# Patient Record
Sex: FEMALE | Race: WHITE | Employment: OTHER | URBAN - METROPOLITAN AREA
[De-identification: names, ages, dates, MRNs, and addresses within clinical notes are randomized per-mention and may not be internally consistent; named-entity substitution may affect disease eponyms.]

---

## 2017-03-22 DIAGNOSIS — K21.9 GASTROESOPHAGEAL REFLUX DISEASE WITHOUT ESOPHAGITIS: ICD-10-CM

## 2017-03-26 NOTE — TELEPHONE ENCOUNTER
omeprazole 20 MG        Last Written Prescription Date:  3/22/16  Last Fill Quantity: 90,   # refills: 3  Last Office Visit : 10/2/16  Future Office visit:  none

## 2017-04-14 DIAGNOSIS — K21.9 GASTROESOPHAGEAL REFLUX DISEASE WITHOUT ESOPHAGITIS: ICD-10-CM

## 2017-05-01 DIAGNOSIS — I10 ESSENTIAL HYPERTENSION: ICD-10-CM

## 2017-05-02 RX ORDER — LOSARTAN POTASSIUM 50 MG/1
TABLET ORAL
Qty: 7 TABLET | Refills: 0 | Status: SHIPPED | OUTPATIENT
Start: 2017-05-02 | End: 2017-07-03

## 2017-05-02 RX ORDER — ATENOLOL 50 MG/1
TABLET ORAL
Qty: 7 TABLET | Refills: 0 | Status: SHIPPED | OUTPATIENT
Start: 2017-05-02 | End: 2017-07-03

## 2017-05-09 ENCOUNTER — PRE VISIT (OUTPATIENT)
Dept: CARDIOLOGY | Facility: CLINIC | Age: 77
End: 2017-05-09

## 2017-05-09 DIAGNOSIS — E78.5 HYPERLIPIDEMIA WITH TARGET LDL LESS THAN 100: Primary | ICD-10-CM

## 2017-05-09 DIAGNOSIS — Z78.9 STATIN INTOLERANCE: ICD-10-CM

## 2017-05-11 ENCOUNTER — OFFICE VISIT (OUTPATIENT)
Dept: CARDIOLOGY | Facility: CLINIC | Age: 77
End: 2017-05-11
Attending: INTERNAL MEDICINE
Payer: COMMERCIAL

## 2017-05-11 VITALS
BODY MASS INDEX: 35.13 KG/M2 | DIASTOLIC BLOOD PRESSURE: 80 MMHG | OXYGEN SATURATION: 93 % | HEIGHT: 62 IN | HEART RATE: 59 BPM | SYSTOLIC BLOOD PRESSURE: 149 MMHG | WEIGHT: 190.9 LBS

## 2017-05-11 DIAGNOSIS — E78.5 HYPERLIPIDEMIA LDL GOAL <100: Primary | ICD-10-CM

## 2017-05-11 DIAGNOSIS — I10 ESSENTIAL HYPERTENSION, BENIGN: ICD-10-CM

## 2017-05-11 DIAGNOSIS — R73.01 ELEVATED FASTING GLUCOSE: ICD-10-CM

## 2017-05-11 DIAGNOSIS — E78.5 HYPERLIPIDEMIA WITH TARGET LDL LESS THAN 100: ICD-10-CM

## 2017-05-11 DIAGNOSIS — Z78.9 STATIN INTOLERANCE: ICD-10-CM

## 2017-05-11 LAB
ALBUMIN SERPL-MCNC: 3.6 G/DL (ref 3.4–5)
ALP SERPL-CCNC: 86 U/L (ref 40–150)
ALT SERPL W P-5'-P-CCNC: 22 U/L (ref 0–50)
ANION GAP SERPL CALCULATED.3IONS-SCNC: 11 MMOL/L (ref 3–14)
AST SERPL W P-5'-P-CCNC: 16 U/L (ref 0–45)
BILIRUB SERPL-MCNC: 0.6 MG/DL (ref 0.2–1.3)
BUN SERPL-MCNC: 14 MG/DL (ref 7–30)
CALCIUM SERPL-MCNC: 9.3 MG/DL (ref 8.5–10.1)
CHLORIDE SERPL-SCNC: 106 MMOL/L (ref 94–109)
CHOLEST SERPL-MCNC: 218 MG/DL
CO2 SERPL-SCNC: 25 MMOL/L (ref 20–32)
CREAT SERPL-MCNC: 0.62 MG/DL (ref 0.52–1.04)
GFR SERPL CREATININE-BSD FRML MDRD: ABNORMAL ML/MIN/1.7M2
GLUCOSE SERPL-MCNC: 110 MG/DL (ref 70–99)
HDLC SERPL-MCNC: 42 MG/DL
LDLC SERPL CALC-MCNC: 132 MG/DL
NONHDLC SERPL-MCNC: 176 MG/DL
POTASSIUM SERPL-SCNC: 4.1 MMOL/L (ref 3.4–5.3)
PROT SERPL-MCNC: 7.2 G/DL (ref 6.8–8.8)
SODIUM SERPL-SCNC: 141 MMOL/L (ref 133–144)
TRIGL SERPL-MCNC: 220 MG/DL

## 2017-05-11 PROCEDURE — 80053 COMPREHEN METABOLIC PANEL: CPT | Performed by: INTERNAL MEDICINE

## 2017-05-11 PROCEDURE — 36415 COLL VENOUS BLD VENIPUNCTURE: CPT | Performed by: INTERNAL MEDICINE

## 2017-05-11 PROCEDURE — 99213 OFFICE O/P EST LOW 20 MIN: CPT | Mod: ZF

## 2017-05-11 PROCEDURE — 80061 LIPID PANEL: CPT | Performed by: INTERNAL MEDICINE

## 2017-05-11 PROCEDURE — 99213 OFFICE O/P EST LOW 20 MIN: CPT | Mod: ZP | Performed by: INTERNAL MEDICINE

## 2017-05-11 RX ORDER — HYDROCHLOROTHIAZIDE 12.5 MG/1
12.5 TABLET ORAL DAILY
Qty: 90 TABLET | Refills: 3 | Status: SHIPPED | OUTPATIENT
Start: 2017-05-11 | End: 2018-04-15

## 2017-05-11 NOTE — PATIENT INSTRUCTIONS
Return to clinic in one year.  Fasting labs will be needed prior to this appointment.     Please do not hesitate to call if you have any cardiology related questions or concerns, or need to schedule an appointment, at [phone].         Cardiology Medication Refill Request Information:  * Please contact your pharmacy regarding ANY request for medication refills.  ** [facility] Prescription Fax = [phone]  * Please allow 3 business days for routine medication refills.    Cardiology Test Result notification information:  *You will be notified with in 7-10 days of your appointment day regarding the results of your test. If you are on MyChart you will be notified as soon as the provider has reviewed the results and signed off on them. Please call RN Care Coordinator with questions regarding results.

## 2017-05-11 NOTE — PROGRESS NOTES
HPI:     I had the privilege to evaluate and examine Mrs Jessica Loaiza, who is a 75 yrs female patient with hyperlipidemia and statin intolerance.   Patient has hyperlipidemia and has followed a relatively well lipid-lowering diet.   She has tried several statins (low potent and high potent) but needed to stop because of muscle pain.   Patient denies chest pain, shortness of breath, palpitations and intermittent claudication.  Patient tolerates metamucil (as an alternative for a resin) and fish oil very well and is happy with this regimen.       PAST MEDICAL HISTORY:  Past Medical History:   Diagnosis Date     Cataracts, bilateral      Dyslipidemia      HTN (hypertension)      Statin intolerance 10/22/2015       CURRENT MEDICATIONS:  Current Outpatient Prescriptions   Medication Sig Dispense Refill     losartan (COZAAR) 50 MG tablet TAKE ONE TABLET BY MOUTH EVERY DAY 7 tablet 0     atenolol (TENORMIN) 50 MG tablet TAKE ONE TABLET BY MOUTH EVERY DAY 7 tablet 0     omeprazole (PRILOSEC) 20 MG CR capsule TAKE 1 TABLET(20 MG) BY MOUTH DAILY 30 TO 60 MINUTES BEFORE A MEAL 90 capsule 1     omeprazole (PRILOSEC) 20 MG CR capsule Take 1 tablet (20 mg) by mouth daily Take 30-60 minutes before a meal. 90 capsule 1     polyethylene glycol 0.4%- propylene glycol 0.3% (SYSTANE ULTRA) 0.4-0.3 % SOLN ophthalmic solution Place 1 drop into both eyes 2 times daily       cyabnocobalamin (VITAMIN B-12) 2500 MCG sublingual tablet Take 1 tablet by mouth daily       calcium carb 1250 mg, 500 mg [place],/vitamin D 200 unit (CALCIUM 500/D) 500-200 MG-UNIT per tablet Take 1 tablet by mouth 2 times daily       citalopram (CELEXA) 20 MG tablet Take 1 tablet (20 mg) by mouth daily 90 tablet 3     hydrochlorothiazide 12.5 MG TABS Take 1 tablet (12.5 mg) by mouth daily (Patient taking differently: Take 12.5 mg by mouth every other day ) 90 tablet 3     psyllium (METAMUCIL SMOOTH TEXTURE) 63 % POWD Take 1 Tablespoonful by mouth 3 times daily Mix  in 8 ounces of water 1 Bottle 11     Omega-3 Fatty Acids (OMEGA 3 PO) Take 1 capsule by mouth daily        COPPER PO Take 1 tablet by mouth as needed        ORDER FOR DME Use your CPAP device as directed by your provider.       minoxidil (ROGAINE) 2 % external solution Apply topically daily        Cetirizine HCl (ZYRTEC PO) Take 1 tablet by mouth daily       econazole nitrate 1 % cream Apply topically 2 times daily 85 g 5     SALICYLIC ACID 40%, UREA 20% IN PETROLEUM, FV COMPOUNDED,    CREAM Twice daily (Patient taking differently: once daily) 120 g 3     Urea 40 % CREA Externally apply  topically. Apply to feet daily 60 g 3     aspirin 81 MG tablet Take 1 tablet by mouth daily.       Calcium Citrate-Vitamin D (CITRACAL + D PO) Take 1 tablet by mouth daily        Multiple Vitamins-Minerals (CENTRUM SILVER PO) Take  by mouth.       Ranitidine HCl (ZANTAC PO) Take 1 tablet by mouth At Bedtime      Patient takes 2 tablets Metasupport OTC to lower chol - she is statin intolerant    PAST SURGICAL HISTORY:  No past surgical history on file.    ALLERGIES     Allergies   Allergen Reactions     Gabapentin Nausea     Ibuprofen Sodium GI Disturbance     Simvastatin Cramps     Other reaction(s): Other  Groin and leg cramps       FAMILY HISTORY:  No family history on file.    SOCIAL HISTORY:  Social History     Social History     Marital status:      Spouse name: N/A     Number of children: N/A     Years of education: N/A     Social History Main Topics     Smoking status: Never Smoker     Smokeless tobacco: Never Used     Alcohol use 0.5 oz/week     1 Glasses of wine per week     Drug use: Not on file     Sexual activity: Not on file     Other Topics Concern     Not on file     Social History Narrative       ROS:   Constitutional: No fever, chills, or sweats. No weight gain/loss   ENT: No visual disturbance, ear ache, epistaxis, sore throat  Allergies/Immunologic: Negative.   Respiratory: No cough,  "hemoptysia  Cardiovascular: As per HPI  GI: No nausea, vomiting, hematemesis, melena, or hematochezia  : No urinary frequency, dysuria, or hematuria  Integument: Negative  Psychiatric: Negative  Neuro: Negative  Endocrinology: Negative   Musculoskeletal: Negative    EXAM:  /80 (BP Location: Right arm, Patient Position: Chair, Cuff Size: Adult Regular)  Pulse 59  Ht 1.562 m (5' 1.5\")  Wt 86.6 kg (190 lb 14.4 oz)  SpO2 93%  BMI 35.49 kg/m2  In general, the patient is a pleasant female in no apparent distress.    HEENT: NC/AT.  PERRLA.  EOMI.  Sclerae white, not injected.  Nares clear.  Pharynx without erythema or exudate.  Dentition intact.    Neck: No adenopathy.  No thyromegaly. Carotids +4/4 bilaterally without bruits.  No jugular venous distension.   Heart: RRR. Normal S1, S2 splits physiologically. No murmur, rub, click, or gallop. The PMI is in the 5th ICS in the midclavicular line. There is no heave.    Lungs: CTA.  No ronchi, wheezes, rales.  No dullness to percussion.   Abdomen: Soft, nontender, nondistended. No organomegaly.  No bruits.   Extremities: No clubbing, cyanosis, or edema.  The pulses are +4/4 at the radial, brachial, femoral, popliteal, DP, and PT sites bilaterally.  No bruits are noted.  Neurologic: Alert and oriented to person/place/time, normal speech, gait and affect  Skin: No petechiae, purpura or rash    BP at end of the visit 122/78.    Labs:     Ref. Range 5/11/2017 10:54   Sodium Latest Ref Range: 133 - 144 mmol/L 141   Potassium Latest Ref Range: 3.4 - 5.3 mmol/L 4.1   Chloride Latest Ref Range: 94 - 109 mmol/L 106   Carbon Dioxide Latest Ref Range: 20 - 32 mmol/L 25   Urea Nitrogen Latest Ref Range: 7 - 30 mg/dL 14   Creatinine Latest Ref Range: 0.52 - 1.04 mg/dL 0.62   GFR Estimate Latest Ref Range: >60 mL/min/1.7m2 >90...   GFR Estimate If Black Latest Ref Range: >60 mL/min/1.7m2 >90...   Calcium Latest Ref Range: 8.5 - 10.1 mg/dL 9.3   Anion Gap Latest Ref Range: 3 - 14 " mmol/L 11   Albumin Latest Ref Range: 3.4 - 5.0 g/dL 3.6   Protein Total Latest Ref Range: 6.8 - 8.8 g/dL 7.2   Bilirubin Total Latest Ref Range: 0.2 - 1.3 mg/dL 0.6   Alkaline Phosphatase Latest Ref Range: 40 - 150 U/L 86   ALT Latest Ref Range: 0 - 50 U/L 22   AST Latest Ref Range: 0 - 45 U/L 16   Cholesterol Latest Ref Range: <200 mg/dL 218 (H)   HDL Cholesterol Latest Ref Range: >49 mg/dL 42 (L)   LDL Cholesterol Calculated Latest Ref Range: <100 mg/dL 132 (H)   Non HDL Cholesterol Latest Ref Range: <130 mg/dL 176 (H)   Triglycerides Latest Ref Range: <150 mg/dL 220 (H)   Glucose Latest Ref Range: 70 - 99 mg/dL 110 (H)     !LIPID/HEPATIC Latest Ref Rng & Units 10/26/2016 5/11/2017   CHOLESTEROL <200 mg/dL 239 (H) 218 (H)   TRIGLYCERIDES <150 mg/dL 195 (H) 220 (H)   HDL CHOLESTEROL >49 mg/dL 37 (L) 42 (L)   LDL CHOLESTEROL, CALCULATED <100 mg/dL 164 (H) 132 (H)   NON HDL CHOLESTEROL <130 mg/dL 202 (H) 176 (H)               Assessment and Plan:        We discussed the results with the patient:  We re-emphasized the importance of a heart healthy and lipid-lowering diet.  We continue with same medical regimen.  Follow-up within 1 year.    Isaías Landry MD, PhD  Professor of Medicine  Division of Cardiology      CC  Patient Care Team:  Babatunde Mueller MD as PCP - General (Family Practice)  Saima Garcia RN as Nurse Coordinator (Cardiology)  Isaías Landry MD as MD (Cardiology)  Janae Myrick MD as MD (INTERNAL MEDICINE - ENDOCRINOLOGY, DIABETES & METABOLISM)  Susan Bear MD as MD (General Surgery)  BABATUNDE MUELLER

## 2017-05-11 NOTE — LETTER
5/11/2017      RE: Jessica Loaiza  [address]  [place] [address]       Dear Colleague,    Thank you for the opportunity to participate in the care of your patient, Jessica Loaiza, at the [facility]. Please see a copy of my visit note below.    HPI:     I had the privilege to evaluate and examine Mrs Jessica Loaiza, who is a 75 yrs female patient with hyperlipidemia and statin intolerance.   Patient has hyperlipidemia and has followed a relatively well lipid-lowering diet.   She has tried several statins (low potent and high potent) but needed to stop because of muscle pain.   Patient denies chest pain, shortness of breath, palpitations and intermittent claudication.  Patient tolerates metamucil (as an alternative for a resin) and fish oil very well and is happy with this regimen.       PAST MEDICAL HISTORY:  Past Medical History:   Diagnosis Date     Cataracts, bilateral      Dyslipidemia      HTN (hypertension)      Statin intolerance 10/22/2015       CURRENT MEDICATIONS:  Current Outpatient Prescriptions   Medication Sig Dispense Refill     losartan (COZAAR) 50 MG tablet TAKE ONE TABLET BY MOUTH EVERY DAY 7 tablet 0     atenolol (TENORMIN) 50 MG tablet TAKE ONE TABLET BY MOUTH EVERY DAY 7 tablet 0     omeprazole (PRILOSEC) 20 MG CR capsule TAKE 1 TABLET(20 MG) BY MOUTH DAILY 30 TO 60 MINUTES BEFORE A MEAL 90 capsule 1     omeprazole (PRILOSEC) 20 MG CR capsule Take 1 tablet (20 mg) by mouth daily Take 30-60 minutes before a meal. 90 capsule 1     polyethylene glycol 0.4%- propylene glycol 0.3% (SYSTANE ULTRA) 0.4-0.3 % SOLN ophthalmic solution Place 1 drop into both eyes 2 times daily       cyabnocobalamin (VITAMIN B-12) 2500 MCG sublingual tablet Take 1 tablet by mouth daily       calcium carb 1250 mg, 500 mg [place],/vitamin D 200 unit (CALCIUM 500/D) 500-200 MG-UNIT per tablet Take 1 tablet by mouth 2 times daily       citalopram  (CELEXA) 20 MG tablet Take 1 tablet (20 mg) by mouth daily 90 tablet 3     hydrochlorothiazide 12.5 MG TABS Take 1 tablet (12.5 mg) by mouth daily (Patient taking differently: Take 12.5 mg by mouth every other day ) 90 tablet 3     psyllium (METAMUCIL SMOOTH TEXTURE) 63 % POWD Take 1 Tablespoonful by mouth 3 times daily Mix in 8 ounces of water 1 Bottle 11     Omega-3 Fatty Acids (OMEGA 3 PO) Take 1 capsule by mouth daily        COPPER PO Take 1 tablet by mouth as needed        ORDER FOR DME Use your CPAP device as directed by your provider.       minoxidil (ROGAINE) 2 % external solution Apply topically daily        Cetirizine HCl (ZYRTEC PO) Take 1 tablet by mouth daily       econazole nitrate 1 % cream Apply topically 2 times daily 85 g 5     SALICYLIC ACID 40%, UREA 20% IN PETROLEUM, FV COMPOUNDED,    CREAM Twice daily (Patient taking differently: once daily) 120 g 3     Urea 40 % CREA Externally apply  topically. Apply to feet daily 60 g 3     aspirin 81 MG tablet Take 1 tablet by mouth daily.       Calcium Citrate-Vitamin D (CITRACAL + D PO) Take 1 tablet by mouth daily        Multiple Vitamins-Minerals (CENTRUM SILVER PO) Take  by mouth.       Ranitidine HCl (ZANTAC PO) Take 1 tablet by mouth At Bedtime      Patient takes 2 tablets Metasupport OTC to lower chol - she is statin intolerant    PAST SURGICAL HISTORY:  No past surgical history on file.    ALLERGIES     Allergies   Allergen Reactions     Gabapentin Nausea     Ibuprofen Sodium GI Disturbance     Simvastatin Cramps     Other reaction(s): Other  Groin and leg cramps       FAMILY HISTORY:  No family history on file.    SOCIAL HISTORY:  Social History     Social History     Marital status:      Spouse name: N/A     Number of children: N/A     Years of education: N/A     Social History Main Topics     Smoking status: Never Smoker     Smokeless tobacco: Never Used     Alcohol use 0.5 oz/week     1 Glasses of wine per week     Drug use: Not on file  "    Sexual activity: Not on file     Other Topics Concern     Not on file     Social History Narrative       ROS:   Constitutional: No fever, chills, or sweats. No weight gain/loss   ENT: No visual disturbance, ear ache, epistaxis, sore throat  Allergies/Immunologic: Negative.   Respiratory: No cough, hemoptysia  Cardiovascular: As per HPI  GI: No nausea, vomiting, hematemesis, melena, or hematochezia  : No urinary frequency, dysuria, or hematuria  Integument: Negative  Psychiatric: Negative  Neuro: Negative  Endocrinology: Negative   Musculoskeletal: Negative    EXAM:  /80 (BP Location: Right arm, Patient Position: Chair, Cuff Size: Adult Regular)  Pulse 59  Ht 1.562 m (5' 1.5\")  Wt 86.6 kg (190 lb 14.4 oz)  SpO2 93%  BMI 35.49 kg/m2  In general, the patient is a pleasant female in no apparent distress.    HEENT: NC/AT.  PERRLA.  EOMI.  Sclerae white, not injected.  Nares clear.  Pharynx without erythema or exudate.  Dentition intact.    Neck: No adenopathy.  No thyromegaly. Carotids +4/4 bilaterally without bruits.  No jugular venous distension.   Heart: RRR. Normal S1, S2 splits physiologically. No murmur, rub, click, or gallop. The PMI is in the 5th ICS in the midclavicular line. There is no heave.    Lungs: CTA.  No ronchi, wheezes, rales.  No dullness to percussion.   Abdomen: Soft, nontender, nondistended. No organomegaly.  No bruits.   Extremities: No clubbing, cyanosis, or edema.  The pulses are +4/4 at the radial, brachial, femoral, popliteal, DP, and PT sites bilaterally.  No bruits are noted.  Neurologic: Alert and oriented to person/place/time, normal speech, gait and affect  Skin: No petechiae, purpura or rash    BP at end of the visit 122/78.    Labs:     Ref. Range 5/11/2017 10:54   Sodium Latest Ref Range: 133 - 144 mmol/L 141   Potassium Latest Ref Range: 3.4 - 5.3 mmol/L 4.1   Chloride Latest Ref Range: 94 - 109 mmol/L 106   Carbon Dioxide Latest Ref Range: 20 - 32 mmol/L 25   Urea " Nitrogen Latest Ref Range: 7 - 30 mg/dL 14   Creatinine Latest Ref Range: 0.52 - 1.04 mg/dL 0.62   GFR Estimate Latest Ref Range: >60 mL/min/1.7m2 >90...   GFR Estimate If Black Latest Ref Range: >60 mL/min/1.7m2 >90...   Calcium Latest Ref Range: 8.5 - 10.1 mg/dL 9.3   Anion Gap Latest Ref Range: 3 - 14 mmol/L 11   Albumin Latest Ref Range: 3.4 - 5.0 g/dL 3.6   Protein Total Latest Ref Range: 6.8 - 8.8 g/dL 7.2   Bilirubin Total Latest Ref Range: 0.2 - 1.3 mg/dL 0.6   Alkaline Phosphatase Latest Ref Range: 40 - 150 U/L 86   ALT Latest Ref Range: 0 - 50 U/L 22   AST Latest Ref Range: 0 - 45 U/L 16   Cholesterol Latest Ref Range: <200 mg/dL 218 (H)   HDL Cholesterol Latest Ref Range: >49 mg/dL 42 (L)   LDL Cholesterol Calculated Latest Ref Range: <100 mg/dL 132 (H)   Non HDL Cholesterol Latest Ref Range: <130 mg/dL 176 (H)   Triglycerides Latest Ref Range: <150 mg/dL 220 (H)   Glucose Latest Ref Range: 70 - 99 mg/dL 110 (H)     !LIPID/HEPATIC Latest Ref Rng & Units 10/26/2016 5/11/2017   CHOLESTEROL <200 mg/dL 239 (H) 218 (H)   TRIGLYCERIDES <150 mg/dL 195 (H) 220 (H)   HDL CHOLESTEROL >49 mg/dL 37 (L) 42 (L)   LDL CHOLESTEROL, CALCULATED <100 mg/dL 164 (H) 132 (H)   NON HDL CHOLESTEROL <130 mg/dL 202 (H) 176 (H)               Assessment and Plan:        We discussed the results with the patient:  We re-emphasized the importance of a heart healthy and lipid-lowering diet.  We continue with same medical regimen.  Follow-up within 1 year.    Isaías Landry MD, PhD  Professor of Medicine  Division of Cardiology      CC  Patient Care Team:  Babatunde Mueller MD as PCP - General (Family Practice)  Saima Garcia RN as Nurse Coordinator (Cardiology)  Isaías Landry MD as MD (Cardiology)  Janae Myrick MD as MD (INTERNAL MEDICINE - ENDOCRINOLOGY, DIABETES & METABOLISM)  Susan Bear MD as MD (General Surgery)

## 2017-05-11 NOTE — NURSING NOTE
Chief Complaint   Patient presents with     Follow Up For     1 year RTC for hyperlipidemia and statin intolerance

## 2017-05-11 NOTE — MR AVS SNAPSHOT
After Visit Summary   5/11/2017    Jessica Loaiza    MRN: [id number]           Patient Information     Date Of Birth          1940        Visit Information        Provider Department      5/11/2017 9:00 AM Isaías Landry MD [facility]        Today's Diagnoses     Hyperlipidemia LDL goal <100    -  1    Essential hypertension, benign          Care Instructions    Return to clinic in one year.  Fasting labs will be needed prior to this appointment.     Please do not hesitate to call if you have any cardiology related questions or concerns, or need to schedule an appointment, at [phone].         Cardiology Medication Refill Request Information:  * Please contact your pharmacy regarding ANY request for medication refills.  ** [facility] Prescription Fax = [phone]  * Please allow 3 business days for routine medication refills.    Cardiology Test Result notification information:  *You will be notified with in 7-10 days of your appointment day regarding the results of your test. If you are on MyChart you will be notified as soon as the provider has reviewed the results and signed off on them. Please call RN Care Coordinator with questions regarding results.            Follow-ups after your visit        Follow-up notes from your care team     Call patient with results Return in about 1 year (around 5/11/2018) for Frantz, Hyperlipidemia, HTN, FASTING lab work, Routine Visit.      Who to contact     If you have questions or need follow up information about today's clinic visit or your schedule please contact [facility] directly at [phone].  Normal or non-critical lab and imaging results will be communicated to you by MyChart, letter or phone within 4 business days after the clinic has received the results. If you do not hear from us within 7 days, please contact the clinic through MyChart or phone. If you have a critical or abnormal lab result, we will notify you by phone as  "soon as possible.  Submit refill requests through [facility] or call your pharmacy and they will forward the refill request to us. Please allow 3 business days for your refill to be completed.          Additional Information About Your Visit        [facility] Information     [facility] lets you send messages to your doctor, view your test results, renew your prescriptions, schedule appointments and more. To sign up, go to [email or web address] . Click on \"Log in\" on the left side of the screen, which will take you to the Welcome page. Then click on \"Sign up Now\" on the right side of the page.     You will be asked to enter the access code listed below, as well as some personal information. Please follow the directions to create your username and password.     Your access code is: [email or web address]  Expires: 2017  3:31 PM     Your access code will  in 90 days. If you need help or a new code, please call your [place] clinic or [phone].        Care EveryWhere ID     This is your Care EveryWhere ID. This could be used by other organizations to access your [place] medical records  [id number]        Your Vitals Were     Pulse Height Pulse Oximetry BMI (Body Mass Index)          59 1.562 m (5' 1.5\") 93% 35.49 kg/m2         Blood Pressure from Last 3 Encounters:   17 149/80   16 177/80   16 171/75    Weight from Last 3 Encounters:   17 86.6 kg (190 lb 14.4 oz)   16 91.2 kg (201 lb)   16 88.9 kg (196 lb)              Today, you had the following     No orders found for display         Today's Medication Changes          These changes are accurate as of: 17 10:21 AM.  If you have any questions, ask your nurse or doctor.               These medicines have changed or have updated prescriptions.        Dose/Directions    hydrochlorothiazide 12.5 MG Tabs tablet   This may have changed:  when to take this   Used for:  Essential hypertension, benign        Dose:  12.5 mg   Take 1 " tablet (12.5 mg) by mouth daily   Quantity:  90 tablet   Refills:  3            Where to get your medicines      These medications were sent to [id number] - [place] - [address] [place] AT [facility] [address] [place] [address]     Phone:  [phone]     hydrochlorothiazide 12.5 MG Tabs tablet    psyllium 63 % Powd                Primary Care Provider Office Phone # Fax #    Babatunde Mueller [phone]       [facility] [address] [place] [address]  [facility] [address]        Thank you!     Thank you for choosing [facility]  for your care. Our goal is always to provide you with excellent care. Hearing back from our patients is one way we can continue to improve our services. Please take a few minutes to complete the written survey that you may receive in the mail after your visit with us. Thank you!             Your Updated Medication List - Protect others around you: Learn how to safely use, store and throw away your medicines at www.disposemymeds.org.          This list is accurate as of: 5/11/17 10:21 AM.  Always use your most recent med list.                   Brand Name Dispense Instructions for use    aspirin 81 MG tablet      Take 1 tablet by mouth daily.       atenolol 50 MG tablet    TENORMIN    7 tablet    TAKE ONE TABLET BY MOUTH EVERY DAY       calcium 500/D 500-200 MG-UNIT per tablet   Generic drug:  calcium carb 1250 mg (500 mg [place])/vitamin D 200 unit      Take 1 tablet by mouth 2 times daily       CENTRUM SILVER PO      Take  by mouth.       citalopram 20 MG tablet    celeXA    90 tablet    Take 1 tablet (20 mg) by mouth daily       CITRACAL + D PO      Take 1 tablet by mouth daily       COPPER PO      Take 1 tablet by mouth as needed       cyabnocobalamin 2500 MCG sublingual tablet    VITAMIN B-12     Take 1 tablet by mouth daily       econazole nitrate 1 % cream     85 g    Apply topically 2 times daily        hydrochlorothiazide 12.5 MG Tabs tablet     90 tablet    Take 1 tablet (12.5 mg) by mouth daily       losartan 50 MG tablet    COZAAR    7 tablet    TAKE ONE TABLET BY MOUTH EVERY DAY       minoxidil 2 % external solution    ROGAINE     Apply topically daily       OMEGA 3 PO      Take 1 capsule by mouth daily       * omeprazole 20 MG CR capsule    priLOSEC    90 capsule    Take 1 tablet (20 mg) by mouth daily Take 30-60 minutes before a meal.       * omeprazole 20 MG CR capsule    priLOSEC    90 capsule    TAKE 1 TABLET(20 MG) BY MOUTH DAILY 30 TO 60 MINUTES BEFORE A MEAL       order for DME      Use your CPAP device as directed by your provider.       polyethylene glycol 0.4%- propylene glycol 0.3% 0.4-0.3 % Soln ophthalmic solution    SYSTANE ULTRA     Place 1 drop into both eyes 2 times daily       psyllium 63 % Powd    METAMUCIL SMOOTH TEXTURE    3 Bottle    Take 1 Tablespoonful by mouth 3 times daily Mix in 8 ounces of water       SALICYLIC ACID 40%, UREA 20% IN PETROLEUM (FV COMPOUNDED)    CREAM     120 g    Twice daily       Urea 40 % Crea     60 g    Externally apply  topically. Apply to feet daily       ZANTAC PO      Take 1 tablet by mouth At Bedtime       ZYRTEC PO      Take 1 tablet by mouth daily       * Notice:  This list has 2 medication(s) that are the same as other medications prescribed for you. Read the directions carefully, and ask your doctor or other care provider to review them with you.

## 2017-05-12 NOTE — NURSING NOTE
Diet: Patient instructed regarding a heart healthy diet, including discussion of reduced fat and sodium intake. Patient demonstrated understanding of this information and agreed to call with further questions or concerns.  Labs: Patient was given results of the laboratory testing obtained today. Patient was instructed to return for the next laboratory testing in one year. Patient demonstrated understanding of this information and agreed to call with further questions or concerns.   Med Reconcile: Reviewed and verified all current medications with the patient. The updated medication list was printed and given to the patient.  Return Appointment: Patient given instructions regarding scheduling next clinic visit. Patient demonstrated understanding of this information and agreed to call with further questions or concerns.  Patient stated she understood all health information given and agreed to call with further questions or concerns.

## 2017-05-15 ENCOUNTER — CARE COORDINATION (OUTPATIENT)
Dept: CARDIOLOGY | Facility: CLINIC | Age: 77
End: 2017-05-15

## 2017-05-15 NOTE — PROGRESS NOTES
Type of call: Test Results       Test Results     Test: Cholesterol    Date completed: 5/11/17    Notes: Pt called and wanted the results of her cholesterol testing.    Patients call back number: [phone]       Component      Latest Ref Rng & Units 4/27/2016 10/26/2016 5/11/2017   Cholesterol      <200 mg/dL 208 (H) 239 (H) 218 (H)   Triglycerides      <150 mg/dL 197 (H) 195 (H) 220 (H)   HDL Cholesterol      >49 mg/dL 42 (L) 37 (L) 42 (L)   LDL Cholesterol Calculated      <100 mg/dL 127 (H) 164 (H) 132 (H)   Non HDL Cholesterol      <130 mg/dL 166 (H) 202 (H) 176 (H)     Writer will route this information to Dr. Landry's RN for ongoing follow up with the patient regarding her lab values.    All questions and concerns were addressed and support was given as needed.

## 2017-05-16 ENCOUNTER — CARE COORDINATION (OUTPATIENT)
Dept: CARDIOLOGY | Facility: CLINIC | Age: 77
End: 2017-05-16

## 2017-05-16 DIAGNOSIS — Z78.9 STATIN INTOLERANCE: Primary | ICD-10-CM

## 2017-05-16 DIAGNOSIS — E78.5 HYPERLIPIDEMIA LDL GOAL <100: ICD-10-CM

## 2017-05-16 RX ORDER — EZETIMIBE 10 MG/1
10 TABLET ORAL DAILY
Qty: 90 TABLET | Refills: 3 | Status: SHIPPED | OUTPATIENT
Start: 2017-05-16

## 2017-05-16 NOTE — PROGRESS NOTES
Date: 5/16/2017    Time of Call: 11:50 AM     Diagnosis:  Hyperlipidemia, statin intolerance     [ TORB ] Ordering provider: Dr. Landry  Order: Start Zetia 10 mg by mouth once daily. Repeat CMP and lipid reflex LDL in 3 months.      Order received by: Saima Garcia, RN, BSN     Follow-up/additional notes: Left patient voice message with above recommendations and encouraged her to return call to confirm receipt of message.

## 2017-05-30 NOTE — PROGRESS NOTES
Patient did not obtaim the Zetia.  She recently started using supplements and would like to reassess her cholesterol after 3 months prior to starting prescription medication.  Discussed concerns regarding supplements, but she is insistent on a 3 month trial.  She would like to have fasting labs locally if possible.  She states that a new clinic recently opened in her hometown, [place]. She would like return call once lab orders have been faxed to local lab.

## 2017-07-03 DIAGNOSIS — I10 ESSENTIAL HYPERTENSION: ICD-10-CM

## 2017-07-03 RX ORDER — LOSARTAN POTASSIUM 50 MG/1
50 TABLET ORAL DAILY
Qty: 7 TABLET | Refills: 0 | Status: SHIPPED | OUTPATIENT
Start: 2017-07-03 | End: 2017-07-13

## 2017-07-03 RX ORDER — ATENOLOL 50 MG/1
50 TABLET ORAL DAILY
Qty: 7 TABLET | Refills: 0 | Status: SHIPPED | OUTPATIENT
Start: 2017-07-03 | End: 2017-07-13

## 2017-07-03 NOTE — TELEPHONE ENCOUNTER
----- Message from Leiladov Avila sent at 7/3/2017  2:06 PM CDT -----  Regarding: Prescription refill  Contact: [phone]  Patient moved up [place], [place], & needs a refill of her losartan & atenolol. She is using the [facility] at [address] [place], [phone]. She said the pharmacist said to ask for a 4-5 days coverage to hold her over until the prescription can be filled.  Please contact patient if any questions.       Thank you!  Rocío    Call Center       Please DO NOT send this message and/or reply back to sender. Call Center Representatives DO NOT respond to messages.

## 2017-07-13 DIAGNOSIS — I10 ESSENTIAL HYPERTENSION: ICD-10-CM

## 2017-07-13 RX ORDER — LOSARTAN POTASSIUM 50 MG/1
50 TABLET ORAL DAILY
Qty: 90 TABLET | Refills: 1 | Status: SHIPPED | OUTPATIENT
Start: 2017-07-13 | End: 2018-01-17

## 2017-07-13 RX ORDER — ATENOLOL 50 MG/1
50 TABLET ORAL DAILY
Qty: 90 TABLET | Refills: 1 | Status: SHIPPED | OUTPATIENT
Start: 2017-07-13 | End: 2017-08-03

## 2017-08-03 ENCOUNTER — TELEPHONE (OUTPATIENT)
Dept: INTERNAL MEDICINE | Facility: CLINIC | Age: 77
End: 2017-08-03

## 2017-08-03 DIAGNOSIS — I10 BENIGN ESSENTIAL HYPERTENSION: Primary | ICD-10-CM

## 2017-08-03 RX ORDER — METOPROLOL SUCCINATE 50 MG/1
50 TABLET, EXTENDED RELEASE ORAL DAILY
Qty: 90 TABLET | Refills: 3 | Status: SHIPPED | OUTPATIENT
Start: 2017-08-03 | End: 2018-06-26

## 2017-10-26 DIAGNOSIS — F32.89 OTHER DEPRESSION: ICD-10-CM

## 2017-10-26 NOTE — LETTER
October 27, 2017      TO: Jessica Loaiza  [address]   [place] [address]     Dear Ms. Lopez NANY Josse,    This letter is a reminder that you are overdue to see your Primary Care Provider for an Annual Visit and needed labs. You must be seen by your Primary Care Provider on a yearly basis and have appropriate labs drawn for continued care and prescription refills. Please call [phone] to schedule an appointment for an Annual Visit with THAIS ARMSTRONG MD.   LAST VISIT  10/26/16    You have been given a 30 day supply/refill of your citalopram (CELEXA) 20 MG tablet while you get your clinic visit/labs completed.    [facility],  Primary Care Center

## 2017-10-27 RX ORDER — CITALOPRAM HYDROBROMIDE 20 MG/1
20 TABLET ORAL DAILY
Qty: 30 TABLET | Refills: 0 | Status: SHIPPED | OUTPATIENT
Start: 2017-10-27 | End: 2017-11-28

## 2017-10-27 NOTE — TELEPHONE ENCOUNTER
LVISIT  10/26/16    NV NONE    citalopram (CELEXA) 20 MG tablet 90 tablet 3 10/26/2016      OVER DUE MD APPT /  LETTER SENT + 30 DAY RF

## 2017-11-04 DIAGNOSIS — K21.9 GASTROESOPHAGEAL REFLUX DISEASE WITHOUT ESOPHAGITIS: ICD-10-CM

## 2017-11-04 NOTE — LETTER
Jessica Loaiza  [address]  [place] [address]        November 6, 2017    This letter is a reminder that you are overdue to see your Primary Care Provider for an Annual Visit and needed labs. You must be seen by your Primary Care Provider on a yearly basis and have appropriate labs drawn for continued care and prescription refills. Please call [phone] to schedule an appointment for an Annual Visit with Dr Ervin CHAHAL.       You have been given a 30 day supply/refill of your   omeprazole (PRILOSEC) 20 MG CR capsule   while you get your clinic visit/labs completed.      Regards,    Primary Care Center

## 2017-11-06 NOTE — TELEPHONE ENCOUNTER
omeprazole (PRILOSEC) 20 MG CR capsule   Last Written Prescription Date:  4/14/17  Last Fill Quantity: 90,   # refills: 1  Last Office Visit : 10/26/16  Future Office visit:  None    appt  Letter sent

## 2017-11-28 ENCOUNTER — OFFICE VISIT (OUTPATIENT)
Dept: FAMILY MEDICINE | Facility: CLINIC | Age: 77
End: 2017-11-28

## 2017-11-28 VITALS
SYSTOLIC BLOOD PRESSURE: 149 MMHG | BODY MASS INDEX: 38.31 KG/M2 | WEIGHT: 202.9 LBS | HEART RATE: 65 BPM | HEIGHT: 61 IN | TEMPERATURE: 98.5 F | DIASTOLIC BLOOD PRESSURE: 82 MMHG

## 2017-11-28 DIAGNOSIS — E78.00 HIGH BLOOD CHOLESTEROL: ICD-10-CM

## 2017-11-28 DIAGNOSIS — G47.33 OSA (OBSTRUCTIVE SLEEP APNEA): ICD-10-CM

## 2017-11-28 DIAGNOSIS — Z23 NEED FOR INFLUENZA VACCINATION: Primary | ICD-10-CM

## 2017-11-28 DIAGNOSIS — F32.89 OTHER DEPRESSION: ICD-10-CM

## 2017-11-28 DIAGNOSIS — M48.061 SPINAL STENOSIS OF LUMBAR REGION, UNSPECIFIED WHETHER NEUROGENIC CLAUDICATION PRESENT: ICD-10-CM

## 2017-11-28 LAB
ALBUMIN SERPL-MCNC: 3.6 G/DL (ref 3.4–5)
ALP SERPL-CCNC: 81 U/L (ref 40–150)
ALT SERPL W P-5'-P-CCNC: 24 U/L (ref 0–50)
ANION GAP SERPL CALCULATED.3IONS-SCNC: 6 MMOL/L (ref 3–14)
AST SERPL W P-5'-P-CCNC: 11 U/L (ref 0–45)
BILIRUB SERPL-MCNC: 0.6 MG/DL (ref 0.2–1.3)
BUN SERPL-MCNC: 19 MG/DL (ref 7–30)
CALCIUM SERPL-MCNC: 9.3 MG/DL (ref 8.5–10.1)
CHLORIDE SERPL-SCNC: 103 MMOL/L (ref 94–109)
CHOLEST SERPL-MCNC: 208 MG/DL
CO2 SERPL-SCNC: 30 MMOL/L (ref 20–32)
CREAT SERPL-MCNC: 0.75 MG/DL (ref 0.52–1.04)
GFR SERPL CREATININE-BSD FRML MDRD: 75 ML/MIN/1.7M2
GLUCOSE SERPL-MCNC: 107 MG/DL (ref 70–99)
HDLC SERPL-MCNC: 45 MG/DL
LDLC SERPL CALC-MCNC: 119 MG/DL
NONHDLC SERPL-MCNC: 163 MG/DL
POTASSIUM SERPL-SCNC: 4.4 MMOL/L (ref 3.4–5.3)
PROT SERPL-MCNC: 7.1 G/DL (ref 6.8–8.8)
SODIUM SERPL-SCNC: 139 MMOL/L (ref 133–144)
TRIGL SERPL-MCNC: 219 MG/DL

## 2017-11-28 RX ORDER — CITALOPRAM HYDROBROMIDE 20 MG/1
20 TABLET ORAL DAILY
Qty: 90 TABLET | Refills: 3 | Status: SHIPPED | OUTPATIENT
Start: 2017-11-28 | End: 2018-11-16

## 2017-11-28 ASSESSMENT — PATIENT HEALTH QUESTIONNAIRE - PHQ9: SUM OF ALL RESPONSES TO PHQ QUESTIONS 1-9: 2

## 2017-11-28 NOTE — NURSING NOTE
"Injectable Influenza Immunization Documentation    1.  Has the patient received the information for the injectable influenza vaccine? YES     2. Is the patient 6 months of age or older? YES     3. Does the patient have any of the following contraindications?         Severe allergy to eggs? No     Severe allergic reaction to previous influenza vaccines? No   Severe allergy to latex? No       History of Guillain-Millstone syndrome? No     Currently have a temperature greater than 100.4F? No        4.  Severely egg allergic patients should have flu vaccine eligibility assessed by an MD, RN, or pharmacist, and those who received flu vaccine should be observed for 15 min by an MD, RN, Pharmacist, Medical Technician, or member of clinic staff.\": YES    5. Latex-allergic patients should be given latex-free influenza vaccine Yes. Please reference the Vaccine latex table to determine if your clinic s product is latex-containing.       Vaccination given by Mable Ireland LPN      "

## 2017-11-28 NOTE — PATIENT INSTRUCTIONS
Dr Mueller [phone]    Sleep Clinic [phone] ([address])  Lab is on the first floor.

## 2017-11-28 NOTE — PROGRESS NOTES
"[facility]  Primary Care Center   Established Patient Encounter   Babatunde Mueller MD  11/28/2017       Chief Complaint:   Physical    History of Present Illness:   Jessica Loaiza is a 76 year old female with a history of hypertension and hyperlipidemia who returns to clinic for her annual physical exam. She has a few concerns today, as below.    Primarily, she is wondering if peripheral neuropathy can cause weakness in her bilateral lower extremities, especially below the knees. She has a difficult time standing from a seated position without assistance, especially later in the day. She is a slow walker who gets tired quickly. She is able to walk approximately 2 blocks normally before getting tired and needing to either slow down or stop. She is unsure if this is associated with pain. When prompted regarding any low back pain, she does endorse history of lumbar back pain that has been mild, but present for many years. Her difficulties walking does improve with leaning forward, such as when pushing a grocery cart.     She mentions that she is sleeping more recently. She is sleeping well through the night, but sometimes is waking up a few times throughout the night feeling disoriented. She feels this is more due to adjusting to her new environment at her daughters house than from any symptoms. She is not feeling shortness of breath at night.    She feels that something is \"off\" with her glands when she inhales. She has no sore throat, ear pains/aches, increased cough or rhinorrhea. She is worried that being around her grandchildren more is going to get her ill.     Jessica does take Celexa which both helps her mood and the tingling in her bilateral feet, although she takes this more for the latter. She feels this gives her some visual hallucinations briefly when she wakes up, causing her to see a large spider. This only happens with waking form a deep sleep.    She is concerned about taking omeprazole, but states " "that without this for ~2 days, she gets very nauseated and has multiple episodes of vomiting.      She has a Baker's cyst in her posterior right knee. She is seeing Dr. Alfonso Contreras at [facility] for this. She was doing cortisone injections for awhile, but has not pursued this recently. This is still bothering her.     Finally, she has a \"fatty tumor\" in her left upper abdomen that causes her pain when bending forward.        Review of Systems:   A full 10-pt Review of Systems was performed, verified and is negative except as documented in the HPI.  All health questionnaires were reviewed, verified and relevant information documented above.     Active Medications:     Current Outpatient Prescriptions:      RaNITidine HCl (ZANTAC PO), Alternates with Prilosec., Disp: , Rfl:      UNABLE TO FIND, daily MEDICATION NAME: Charles Support, Disp: , Rfl:      citalopram (CELEXA) 20 MG tablet, Take 1 tablet (20 mg) by mouth daily *LETTER SENT* SCHEDULE MD APPT., Disp: 30 tablet, Rfl: 0     metoprolol (TOPROL-XL) 50 MG 24 hr tablet, Take 1 tablet (50 mg) by mouth daily, Disp: 90 tablet, Rfl: 3     losartan (COZAAR) 50 MG tablet, Take 1 tablet (50 mg) by mouth daily, Disp: 90 tablet, Rfl: 1     psyllium (METAMUCIL SMOOTH TEXTURE) 63 % POWD, Take 1 Tablespoonful by mouth 3 times daily Mix in 8 ounces of water, Disp: 3 Bottle, Rfl: 3     hydrochlorothiazide 12.5 MG TABS tablet, Take 1 tablet (12.5 mg) by mouth daily (Patient taking differently: Take 12.5 mg by mouth daily Every other day.), Disp: 90 tablet, Rfl: 3     omeprazole (PRILOSEC) 20 MG CR capsule, Take 1 tablet (20 mg) by mouth daily Take 30-60 minutes before a meal. (Patient taking differently: Take 1 tablet (20 mg) by mouth daily Take 30-60 minutes before a meal.  Taking every other day. Alternates with Zantac.), Disp: 90 capsule, Rfl: 1     polyethylene glycol 0.4%- propylene glycol 0.3% (SYSTANE ULTRA) 0.4-0.3 % SOLN ophthalmic solution, Place 1 drop into both eyes 2 times " "daily, Disp: , Rfl:      cyabnocobalamin (VITAMIN B-12) 2500 MCG sublingual tablet, Take 1 tablet by mouth daily Taking daily., Disp: , Rfl:      Omega-3 Fatty Acids (OMEGA 3 PO), Take 1 capsule by mouth daily , Disp: , Rfl:      COPPER PO, Take 1 tablet by mouth as needed , Disp: , Rfl:      Cetirizine HCl (ZYRTEC PO), Take 1 tablet by mouth daily, Disp: , Rfl:      aspirin 81 MG tablet, Take 1 tablet by mouth daily., Disp: , Rfl:      Calcium Citrate-Vitamin D (CITRACAL + D PO), Take 1 tablet by mouth daily , Disp: , Rfl:      Multiple Vitamins-Minerals (CENTRUM SILVER PO), Take  by mouth., Disp: , Rfl:      ezetimibe (ZETIA) 10 MG tablet, Take 1 tablet (10 mg) by mouth daily (Patient not taking: Reported on 11/28/2017), Disp: 90 tablet, Rfl: 3     ORDER FOR DME, Use your CPAP device as directed by your provider., Disp: , Rfl:      minoxidil (ROGAINE) 2 % external solution, Apply topically daily , Disp: , Rfl:      econazole nitrate 1 % cream, Apply topically 2 times daily, Disp: 85 g, Rfl: 5     SALICYLIC ACID 40%, UREA 20% IN PETROLEUM, FV COMPOUNDED,    CREAM, Twice daily (Patient not taking: Reported on 5/11/2017), Disp: 120 g, Rfl: 3     Urea 40 % CREA, Externally apply  topically. Apply to feet daily (Patient not taking: Reported on 11/28/2017), Disp: 60 g, Rfl: 3     Allergies:   Gabapentin  Ibuprofen sodium  Simvastatin      Past Medical History:  Cataracts   Hyperlipidemia  Hypertension  Statin intolerance   Acid reflux   Neuropathy     Past Surgical History:  No previous surgical history.      Social History:   .   She recently sold her house in April. She is working on building a new house in [place]. She has been unable  To build, so is living with her daughter, Leila.  No history of tobacco use. Drinks one glass of wine weekly.       Physical Exam:   /82  Pulse 65  Ht 1.556 m (5' 1.25\")  Wt 92 kg (202 lb 14.4 oz)  BMI 38.03 kg/m2   Constitutional: Alert. In no distress.  Head: " Normocephalic. No masses, lesions, tenderness or abnormalities.  ENT: No neck nodes or sinus tenderness. Thyroid feels normal. Ears normal.   Cardiovascular: RRR. No murmurs, clicks, gallops, or rub.  Respiratory: Percussion normal. Good diaphragmatic excursion. Lungs clear.  Gastrointestinal: Abdomen soft. Non-tender. BS normal. No masses or organomegaly.   Musculoskeletal: Extremities normal. No gross deformities noted. Normal muscle tone. Tenderness over the left lower rib cage.   Skin: Left upper quadrant with 3 inch mobile subdermal fatty prominence. Left shin with a 2 in subdermal soft, mobile non-tender mass.   Neurologic: Gait normal.   Psychiatric: Mentation appears normal. Normal affect.   Hematologic/Lymphatic/Immunologic: Normal cervical lymph nodes.      Assessment and Plan:  1. Lumbar back pain with leg weakness on exertion consistent with spinal stenosis   I am suspicious that her difficulties with walking are more related to her lumbar back than peripheral neuropathy. We discussed evaluation and treatment options including follow up with spine specialists and/or pursuing aquatic physical therapy, cortisone injections, etc. She is interested in pursuing aquatic PT. We will send her information about this.     2. Peripheral neuropathy   She is taking Celexa, which she originally started for anxiety and depression. Although she no longer needs this for life stressors, she continues to take it as it helps with her peripheral neuropathy. She will continue to take this.     3. Hypertension  Will check blood work today. She does not check her blood pressures at home. She agrees to get a blood pressure monitoring kit and start to log these regularly. Continue with metoprolol, hydrochlorothiazide, and aspirin.     4. Hyperlipidemia   Will check blood work today. Continue on current medications.     5. GERD  We discussed the risks and benefits of taking Omeprazole. She has bad symptoms without treating, thus I  suggested that she take this daily.     6. Obstructive sleep apnea   Currently uses a CPAP machine. She was last seen over a year ago. We will set up a follow up appointment for the next couple of months.     7. Possible lipoma, left upper quadrant  The patient may have a lipoma over the left upper quadrant on exam, though this is not consistent with her location of tenderness. I explained to the patient that I doubt that surgical removal of the lipoma would have any effect on her pain.     8. Baker's cyst   She will continue to follow at [facility] for treatments.     9. Routine Health Maintenance  We reviewed the health maintenance topics including immunizations, cancer screenings, and routine blood work. We will get labs today, as below. Flu vaccine administered today.     Immunizations (zoster, pneumovax, flu, Tdap, Hep A/B):   Most Recent Immunizations   Administered Date(s) Administered     Influenza (High Dose) 3 valent vaccine 10/26/2016     Influenza (IIV3) PF 11/12/2013     Pneumococcal (PCV 13) 11/23/2015     Pneumococcal 23 valent 04/08/2011     TD (ADULT, 7+) 01/29/2008     Tdap (Adacel,Boostrix) 11/19/2009     Zoster vaccine, live 04/08/2011     Colonoscopy (50-75 yrs): Last complete 2/29/08.  Dexa (>65W or 70M yrs): Done in 10/2016.   Mammogram (40-75 yrs): Due on 10/27/2017. She will call and schedule on her own.         Follow-up: Return to clinic in 1 year for her annual check-up.        Scribe Disclosure:   I, Donna Encinas, am serving as a scribe to document services personally performed by Babatunde Mueller MD at this visit, based upon the provider's statements to me. All documentation has been reviewed by the aforementioned provider prior to being entered into the official medical record.     Portions of this medical record were completed by a scribe. UPON MY REVIEW AND AUTHENTICATION BY ELECTRONIC SIGNATURE, this confirms (a) I performed the applicable clinical services, and  (b) the record is accurate.      Babatunde Mueller MD

## 2017-11-28 NOTE — NURSING NOTE
Rooming Note    Health Maintenance  Health Maintenance Due   Topic Date Due     DEPRESSION ACTION PLAN Q1 YR  [birth year]04/1958     ADVANCE DIRECTIVE PLANNING Q5 YRS  12/04/1997     PHQ-9 Q6 MONTHS  04/26/2017     INFLUENZA VACCINE (SYSTEM ASSIGNED)  09/01/2017     FALL RISK ASSESSMENT  10/26/2017     MAMMO Q1 YR  10/27/2017   Health maintenance items discussed and ordered/forwarded to PCP    Blood Pressure  BP Readings from Last 1 Encounters:   11/28/17 168/81   Single BP recheck started, 11:07 AM (4 minutes)

## 2017-11-28 NOTE — MR AVS SNAPSHOT
After Visit Summary   11/28/2017    Jessica Loaiza    MRN: [id number]           Patient Information     Date Of Birth          1940        Visit Information        Provider Department      11/28/2017 11:00 AM Babatunde Mueller MD [facility]        Today's Diagnoses     Need for influenza vaccination    -  1    Other depression        MARCELO (obstructive sleep apnea)        High blood cholesterol        Spinal stenosis of lumbar region, unspecified whether neurogenic claudication present          Care Instructions    Dr Mueller [phone]    Sleep Clinic [phone] ([address])  Lab is on the first floor.             Follow-ups after your visit        Additional Services     SLEEP EVALUATION & MANAGEMENT REFERRAL - ADULT -Other (Respond in commments)       Please be aware that coverage of these services is subject to the terms and limitations of your health insurance plan.  Call member services at your health plan with any benefit or coverage questions.      Please bring the following to your appointment:    >>   List of current medications   >>   This referral request   >>   Any documents/labs given to you for this referral                      Follow-up notes from your care team     Return in about 1 year (around 11/28/2018).      Future tests that were ordered for you today     Open Future Orders        Priority Expected Expires Ordered    Lipid panel reflex to direct LDL Fasting Routine 11/28/2017 12/12/2017 11/28/2017    Comprehensive metabolic panel Routine 11/28/2017 12/12/2017 11/28/2017    SLEEP EVALUATION & MANAGEMENT REFERRAL - ADULT -Other (Respond in commments) Routine  11/28/2018 11/28/2017            Who to contact     Please call your clinic at [phone] to:    Ask questions about your health    Make or cancel appointments    Discuss your medicines    Learn about your test results    Speak to your doctor   If you have compliments or  "concerns about an experience at your clinic, or if you wish to file a complaint, please contact [facility] Patient Relations at [phone] or email us at [email or web address]         Additional Information About Your Visit        Venmo Information     Venmo is an electronic gateway that provides easy, online access to your medical records. With Venmo, you can request a clinic appointment, read your test results, renew a prescription or communicate with your care team.     To sign up for Venmo visit the website at [email or web address]   You will be asked to enter the access code listed below, as well as some personal information. Please follow the directions to create your username and password.     Your access code is: [email or web address]  Expires: 2018  6:31 AM     Your access code will  in 90 days. If you need help or a new code, please contact your [facility] or call [phone] for assistance.        Care EveryWhere ID     This is your Care EveryWhere ID. This could be used by other organizations to access your [place] medical records  [id number]        Your Vitals Were     Pulse Height BMI (Body Mass Index)             65 1.556 m (5' 1.25\") 38.03 kg/m2          Blood Pressure from Last 3 Encounters:   17 149/82   17 149/80   16 177/80    Weight from Last 3 Encounters:   17 92 kg (202 lb 14.4 oz)   17 86.6 kg (190 lb 14.4 oz)   16 91.2 kg (201 lb)              We Performed the Following     FLU VACCINE HIGH DOSE PRESERVATIVE FREE, AGE =>65 YR          Today's Medication Changes          These changes are accurate as of: 17 12:06 PM.  If you have any questions, ask your nurse or doctor.               These medicines have changed or have updated prescriptions.        Dose/Directions    citalopram 20 MG tablet   Commonly known as:  celeXA   This may have changed:  additional " instructions   Used for:  Other depression        Dose:  20 mg   Take 1 tablet (20 mg) by mouth daily   Quantity:  90 tablet   Refills:  3       hydrochlorothiazide 12.5 MG Tabs tablet   This may have changed:  additional instructions   Used for:  Essential hypertension, benign        Dose:  12.5 mg   Take 1 tablet (12.5 mg) by mouth daily   Quantity:  90 tablet   Refills:  3       omeprazole 20 MG CR capsule   Commonly known as:  priLOSEC   This may have changed:    - additional instructions  - Another medication with the same name was removed. Continue taking this medication, and follow the directions you see here.   Used for:  Gastroesophageal reflux disease without esophagitis        Take 1 tablet (20 mg) by mouth daily Take 30-60 minutes before a meal.   Quantity:  90 capsule   Refills:  1       ZANTAC PO   This may have changed:  Another medication with the same name was removed. Continue taking this medication, and follow the directions you see here.        Alternates with Prilosec.   Refills:  0         Stop taking these medicines if you haven't already. Please contact your care team if you have questions.     calcium 500/D 500-200 MG-UNIT per tablet   Generic drug:  calcium carb 1250 mg (500 mg [place])/vitamin D 200 unit                Where to get your medicines      These medications were sent to [id number] - [place], [place] - [address] AT [facility] [address]  [address] [facility] [address]     Phone:  [phone]     citalopram 20 MG tablet                Primary Care Provider Office Phone # Fax #    Babatunde Mueller [phone]       [address] [address]        Equal Access to Services     [facility] : Richard Saha, waaxda luchristiano, qaybta kaalmakenna guzman, nikki robertson. [facility] [phone].    ATENCIÓN: Si habla español, tiene a mathew disposición servicios  shaniqua de asistencia lingüística. [place] [phone].    We comply with applicable federal civil rights laws and [place] laws. We do not discriminate on the basis of race, color, national origin, age, disability, sex, sexual orientation, or gender identity.            Thank you!     Thank you for choosing [facility]  for your care. Our goal is always to provide you with excellent care. Hearing back from our patients is one way we can continue to improve our services. Please take a few minutes to complete the written survey that you may receive in the mail after your visit with us. Thank you!             Your Updated Medication List - Protect others around you: Learn how to safely use, store and throw away your medicines at www.disposemymeds.org.          This list is accurate as of: 11/28/17 12:06 PM.  Always use your most recent med list.                   Brand Name Dispense Instructions for use Diagnosis    aspirin 81 MG tablet      Take 1 tablet by mouth daily.    Unspecified hereditary and idiopathic peripheral neuropathy       CENTRUM SILVER PO      Take  by mouth.    Unspecified hereditary and idiopathic peripheral neuropathy       citalopram 20 MG tablet    celeXA    90 tablet    Take 1 tablet (20 mg) by mouth daily    Other depression       CITRACAL + D PO      Take 1 tablet by mouth daily    Unspecified hereditary and idiopathic peripheral neuropathy       COPPER PO      Take 1 tablet by mouth as needed        cyabnocobalamin 2500 MCG sublingual tablet    VITAMIN B-12     Take 1 tablet by mouth daily Taking daily.        econazole nitrate 1 % cream     85 g    Apply topically 2 times daily    Dermatophytosis of nail, Dermatophytosis of foot       ezetimibe 10 MG tablet    ZETIA    90 tablet    Take 1 tablet (10 mg) by mouth daily    Statin intolerance, Hyperlipidemia LDL goal <100       hydrochlorothiazide 12.5 MG Tabs tablet     90 tablet    Take 1 tablet (12.5 mg) by mouth daily     Essential hypertension, benign       losartan 50 MG tablet    COZAAR    90 tablet    Take 1 tablet (50 mg) by mouth daily    Essential hypertension       metoprolol 50 MG 24 hr tablet    TOPROL-XL    90 tablet    Take 1 tablet (50 mg) by mouth daily    Benign essential hypertension       minoxidil 2 % external solution    ROGAINE     Apply topically daily        OMEGA 3 PO      Take 1 capsule by mouth daily        omeprazole 20 MG CR capsule    priLOSEC    90 capsule    Take 1 tablet (20 mg) by mouth daily Take 30-60 minutes before a meal.    Gastroesophageal reflux disease without esophagitis       order for DME      Use your CPAP device as directed by your provider.        polyethylene glycol 0.4%- propylene glycol 0.3% 0.4-0.3 % Soln ophthalmic solution    SYSTANE ULTRA     Place 1 drop into both eyes 2 times daily        psyllium 63 % Powd    METAMUCIL SMOOTH TEXTURE    3 Bottle    Take 1 Tablespoonful by mouth 3 times daily Mix in 8 ounces of water    Hyperlipidemia LDL goal <100       SALICYLIC ACID 40%, UREA 20% IN PETROLEUM (FV COMPOUNDED)    CREAM     120 g    Twice daily    Callus of foot       UNABLE TO FIND      daily MEDICATION NAME: Charles Support        Urea 40 % Crea     60 g    Externally apply  topically. Apply to feet daily    Callus of foot       ZANTAC PO      Alternates with Prilosec.        ZYRTEC PO      Take 1 tablet by mouth daily

## 2017-12-01 ENCOUNTER — TELEPHONE (OUTPATIENT)
Dept: INTERNAL MEDICINE | Facility: CLINIC | Age: 77
End: 2017-12-01

## 2017-12-01 NOTE — TELEPHONE ENCOUNTER
I left a detailed message to the pt to call me back on 11/28.  [facility] in [place] provides pool therapy and we can fax ([phone]) the referral to them, then they will schedule the pt.    Soon-Mi  -----------------------------------------------------      ----- Message from Babatunde Mueller MD sent at 11/28/2017 11:42 AM CST -----  I'd like her to have pool therapy for spinal stenosis, I cannot get computer to open [employer] it's very slow. She lives in [place] now, I'm wondering if [facility] or somewhere in [place] has that can you check and let her know?

## 2017-12-04 DIAGNOSIS — K21.9 GASTROESOPHAGEAL REFLUX DISEASE WITHOUT ESOPHAGITIS: ICD-10-CM

## 2017-12-29 ENCOUNTER — HEALTH MAINTENANCE LETTER (OUTPATIENT)
Age: 77
End: 2017-12-29

## 2018-01-17 DIAGNOSIS — I10 ESSENTIAL HYPERTENSION: ICD-10-CM

## 2018-01-18 DIAGNOSIS — I10 ESSENTIAL HYPERTENSION: ICD-10-CM

## 2018-01-18 NOTE — TELEPHONE ENCOUNTER
losartan (COZAAR) 50 MG tablet      Last Written Prescription Date:  7-13-17  Last Fill Quantity: 90,   # refills: 1  Last Office Visit : 11-28-17  Future Office visit:  none    Last clinic note for HTN dose not mention Losartan:    3. Hypertension  Will check blood work today. She does not check her blood pressures at home. She agrees to get a blood pressure monitoring kit and start to log these regularly. Continue with metoprolol, hydrochlorothiazide, and aspirin.     Kathleen M Doege RN

## 2018-01-19 RX ORDER — LOSARTAN POTASSIUM 50 MG/1
TABLET ORAL
Qty: 90 TABLET | Refills: 0 | OUTPATIENT
Start: 2018-01-19

## 2018-01-19 RX ORDER — LOSARTAN POTASSIUM 50 MG/1
50 TABLET ORAL DAILY
Qty: 90 TABLET | Refills: 1 | Status: SHIPPED | OUTPATIENT
Start: 2018-01-19 | End: 2020-10-21

## 2018-04-12 DIAGNOSIS — I10 ESSENTIAL HYPERTENSION, BENIGN: ICD-10-CM

## 2018-04-13 RX ORDER — HYDROCHLOROTHIAZIDE 12.5 MG/1
12.5 TABLET ORAL DAILY
Qty: 90 TABLET | Refills: 3 | Status: CANCELLED | OUTPATIENT
Start: 2018-04-13

## 2018-04-15 RX ORDER — HYDROCHLOROTHIAZIDE 12.5 MG/1
12.5 TABLET ORAL DAILY
Qty: 45 TABLET | Refills: 1 | Status: SHIPPED | OUTPATIENT
Start: 2018-04-15 | End: 2019-06-06

## 2018-04-28 ENCOUNTER — HEALTH MAINTENANCE LETTER (OUTPATIENT)
Age: 78
End: 2018-04-28

## 2018-06-26 DIAGNOSIS — I10 BENIGN ESSENTIAL HYPERTENSION: ICD-10-CM

## 2018-06-27 RX ORDER — METOPROLOL SUCCINATE 50 MG/1
50 TABLET, EXTENDED RELEASE ORAL DAILY
Qty: 90 TABLET | Refills: 1 | Status: SHIPPED | OUTPATIENT
Start: 2018-06-27 | End: 2018-11-16

## 2018-06-27 NOTE — TELEPHONE ENCOUNTER
METOPROLOL SUCC 50MG ER TAB       Last Written Prescription Date:  8/3/2017  Last Fill Quantity: 90,   # refills: 3  Last Office Visit : 11/28/2017  Future Office visit:  None    Routing refill request to provider for review/approval because:  BP  11/28/17 149/82   05/11/17 149/80   12/08/16 177/80     11/27/2017: Annual review, excerpted:   Hypertension  Will check blood work today. She does not check her blood pressures at home. She agrees to get a blood pressure monitoring kit and start to log these regularly. Continue with metoprolol, hydrochlorothiazide, and aspirin.   Labs 11/27/2017: reviewed:  Labs very stable from last year till now.

## 2018-10-24 DIAGNOSIS — I10 ESSENTIAL HYPERTENSION: ICD-10-CM

## 2018-10-25 RX ORDER — LOSARTAN POTASSIUM 50 MG/1
50 TABLET ORAL DAILY
Qty: 90 TABLET | Refills: 0 | Status: SHIPPED | OUTPATIENT
Start: 2018-10-25 | End: 2019-01-20

## 2018-10-25 NOTE — TELEPHONE ENCOUNTER
LOSARTAN 50MG TABLET  Last Written Prescription Date:  1/19/2018  Last Fill Quantity: 90,   # refills: 1  Last Office Visit : 11/27/2017  Future Office visit:   11/28/2017    Routing refill request to provider for review/approval because:BP:  11/28/17 149/82   05/11/17 149/80   12/08/16 177/80        Last clinic note of 11/27/2018 for HTN dose not mention Losartan, does [place].     3. Hypertension  Will check blood work today. She does not check her blood pressures at home. She agrees to get a blood pressure monitoring kit and start to log these regularly. Continue with metoprolol, hydrochlorothiazide, and aspirin.

## 2018-11-16 ENCOUNTER — TELEPHONE (OUTPATIENT)
Dept: GASTROENTEROLOGY | Facility: CLINIC | Age: 78
End: 2018-11-16

## 2018-11-16 ENCOUNTER — PATIENT OUTREACH (OUTPATIENT)
Dept: CARE COORDINATION | Facility: CLINIC | Age: 78
End: 2018-11-16

## 2018-11-16 ENCOUNTER — OFFICE VISIT (OUTPATIENT)
Dept: FAMILY MEDICINE | Facility: CLINIC | Age: 78
End: 2018-11-16
Payer: COMMERCIAL

## 2018-11-16 VITALS — HEART RATE: 65 BPM | SYSTOLIC BLOOD PRESSURE: 176 MMHG | RESPIRATION RATE: 18 BRPM | DIASTOLIC BLOOD PRESSURE: 84 MMHG

## 2018-11-16 DIAGNOSIS — E78.5 HYPERLIPIDEMIA WITH TARGET LDL LESS THAN 100: ICD-10-CM

## 2018-11-16 DIAGNOSIS — Z78.9 STATIN INTOLERANCE: Primary | ICD-10-CM

## 2018-11-16 DIAGNOSIS — G47.33 OSA (OBSTRUCTIVE SLEEP APNEA): ICD-10-CM

## 2018-11-16 DIAGNOSIS — Z78.0 POSTMENOPAUSAL STATUS: ICD-10-CM

## 2018-11-16 DIAGNOSIS — Z00.00 HEALTH CARE MAINTENANCE: ICD-10-CM

## 2018-11-16 DIAGNOSIS — I10 BENIGN ESSENTIAL HYPERTENSION: ICD-10-CM

## 2018-11-16 DIAGNOSIS — F32.89 OTHER DEPRESSION: ICD-10-CM

## 2018-11-16 LAB
ALBUMIN SERPL-MCNC: 3.7 G/DL (ref 3.4–5)
ALP SERPL-CCNC: 79 U/L (ref 40–150)
ALT SERPL W P-5'-P-CCNC: 26 U/L (ref 0–50)
ANION GAP SERPL CALCULATED.3IONS-SCNC: 6 MMOL/L (ref 3–14)
AST SERPL W P-5'-P-CCNC: 17 U/L (ref 0–45)
BILIRUB SERPL-MCNC: 0.6 MG/DL (ref 0.2–1.3)
BUN SERPL-MCNC: 18 MG/DL (ref 7–30)
CALCIUM SERPL-MCNC: 9.2 MG/DL (ref 8.5–10.1)
CHLORIDE SERPL-SCNC: 104 MMOL/L (ref 94–109)
CHOLEST SERPL-MCNC: 246 MG/DL
CO2 SERPL-SCNC: 28 MMOL/L (ref 20–32)
CREAT SERPL-MCNC: 0.75 MG/DL (ref 0.52–1.04)
GFR SERPL CREATININE-BSD FRML MDRD: 76 ML/MIN/1.7M2
GLUCOSE SERPL-MCNC: 111 MG/DL (ref 70–99)
HDLC SERPL-MCNC: 41 MG/DL
LDLC SERPL CALC-MCNC: 167 MG/DL
NONHDLC SERPL-MCNC: 204 MG/DL
POTASSIUM SERPL-SCNC: 4.3 MMOL/L (ref 3.4–5.3)
PROT SERPL-MCNC: 7.4 G/DL (ref 6.8–8.8)
SODIUM SERPL-SCNC: 138 MMOL/L (ref 133–144)
TRIGL SERPL-MCNC: 187 MG/DL

## 2018-11-16 RX ORDER — METOPROLOL SUCCINATE 50 MG/1
50 TABLET, EXTENDED RELEASE ORAL DAILY
Qty: 90 TABLET | Refills: 3 | Status: SHIPPED | OUTPATIENT
Start: 2018-11-16 | End: 2020-10-22

## 2018-11-16 RX ORDER — CITALOPRAM HYDROBROMIDE 20 MG/1
20 TABLET ORAL DAILY
Qty: 90 TABLET | Refills: 3 | Status: SHIPPED | OUTPATIENT
Start: 2018-11-16 | End: 2020-10-15

## 2018-11-16 RX ORDER — LOSARTAN POTASSIUM 100 MG/1
100 TABLET ORAL DAILY
Qty: 90 TABLET | Refills: 3 | Status: SHIPPED | OUTPATIENT
Start: 2018-11-16 | End: 2020-01-02

## 2018-11-16 ASSESSMENT — PAIN SCALES - GENERAL: PAINLEVEL: EXTREME PAIN (8)

## 2018-11-16 NOTE — PATIENT INSTRUCTIONS
Primary Care Center Medication Refill Request Information:  * Please contact your pharmacy regarding ANY request for medication refills.  ** [facility] Prescription Fax = [phone]  * Please allow 3 business days for routine medication refills.  * Please allow 5 business days for controlled substance medication refills.     Primary Care Center Test Result notification information:  *You will be notified with in 7-10 days of your appointment day regarding the results of your test.  If you are on MyChart you will be notified as soon as the provider has reviewed the results and signed off on them.    Primary Care Center [phone]   Colonoscopy  [phone]  Imaging   [phone]  ([facility])  [phone]  [facility] ([place] [place])  [phone]  [facility] .... [place] ([facility]) and [place] ([facility])    DEXA Screening Tool    Dexa Scan  Is the patient taking any calcium supplements? yes, if yes patient must stop calcium supplements 48 hours prior to appointment.

## 2018-11-16 NOTE — NURSING NOTE
Chief Complaint   Patient presents with     Physical     Patient is here for physical exam     Medication Question     Also here for medication question about hydrochlorothiazide       Edmundo Baker CMA (AAMA) at 10:55 AM on 11/16/2018

## 2018-11-16 NOTE — MR AVS SNAPSHOT
After Visit Summary   11/16/2018    Jessica Loaiza    MRN: [id number]           Patient Information     Date Of Birth          1940        Visit Information        Provider Department      11/16/2018 10:50 AM Babatunde Mueller MD [facility]        Today's Diagnoses     Statin intolerance    -  1    Hyperlipidemia with target LDL less than 100        Health care maintenance        MARCELO (obstructive sleep apnea)        Postmenopausal status        Other depression        Benign essential hypertension          Care Instructions    Primary Care Center Medication Refill Request Information:  * Please contact your pharmacy regarding ANY request for medication refills.  ** [facility] Prescription Fax = [phone]  * Please allow 3 business days for routine medication refills.  * Please allow 5 business days for controlled substance medication refills.     Primary Care Center Test Result notification information:  *You will be notified with in 7-10 days of your appointment day regarding the results of your test.  If you are on MyChart you will be notified as soon as the provider has reviewed the results and signed off on them.    Primary Care Center [phone]   Colonoscopy  [phone]  Imaging   [phone]  ([facility])  [phone]  [facility] ([place] [place])  [phone]  [facility] [place] ([facility]) and [place] ([facility])    DEXA Screening Tool    Dexa Scan  Is the patient taking any calcium supplements? yes, if yes patient must stop calcium supplements 48 hours prior to appointment.          Follow-ups after your visit        Additional Services     GASTROENTEROLOGY ADULT REF PROCEDURE ONLY       Last Lab Result: Creatinine (mg/dL)       Date                     Value                 11/28/2017               0.75             ----------  There is no height or weight on file to calculate BMI.      Needed:  No  Language:  English    Patient will be contacted to schedule procedure.     Please be aware that coverage of these services is subject to the terms and limitations of your health insurance plan.  Call member services at your health plan with any benefit or coverage questions.  Any procedures must be performed at a [place] facility OR coordinated by your clinic's referral office.    Please bring the following with you to your appointment:    (1) Any X-Rays, CTs or MRIs which have been performed.  Contact the facility where they were done to arrange for  prior to your scheduled appointment.    (2) List of current medications   (3) This referral request   (4) Any documents/labs given to you for this referral            SLEEP EVALUATION & MANAGEMENT REFERRAL - ADULT -Other (Respond in commments) ([facility])       Please be aware that coverage of these services is subject to the terms and limitations of your health insurance plan.  Call member services at your health plan with any benefit or coverage questions.      Please bring the following to your appointment:    >>   List of current medications   >>   This referral request   >>   Any documents/labs given to you for this referral                      Your next 10 appointments already scheduled     Nov 20, 2018  1:30 PM CST   Return Sleep Patient with MARIO ALBERTO Ray CNP   [facility] [place] ([facility])    [address] [place] [id number] [phone]            Nov 23, 2018  1:45 PM CST   [place] SCREENING DIGITAL BILATERAL with [id number]   [facility] ([facility])    [address]  [facility] [id number] [phone]           How do I prepare for my exam? (Food and drink instructions) No Food and Drink Restrictions.  How do I prepare for my exam? (Other instructions) Do not use any powder, lotion or deodorant  "under your arms or on your breast. If you do, we will ask you to remove it before your exam.  What should I wear: Wear comfortable, two-piece clothing.  How long does the exam take: Most scans will take 15 minutes.  What should I bring: Bring any previous mammograms from other facilities or have them mailed to the breast center.  Do I need a :  No  is needed.  What do I need to tell my doctor: If you have any allergies, tell your care team.  What should I do after the exam: No restrictions, You may resume normal activities.  What is this test: This test is an x-ray of the breast to look for breast disease. The breast is pressed between two plates to flatten and spread the tissue. An X-ray is taken of the breast from different angles.  Who should I call with questions: If you have any questions, please call the Imaging Department where you will have your exam. Directions, parking instructions, and other information is available on our website, [email or web address].  Other information about my exam Three-dimensional (3D) mammograms are available at [place] locations in [facility], [facility], [place], [facility] and [place]. [facility] locations include [place] and the [facility] [place] in [place].  Benefits of 3D mammograms include * Improved rate of cancer detection * Decreases your chance of having to go back for more tests, which means fewer: * \"False-positive\" results (This means that there is an abnormal area but it isn't cancer.) * Invasive testing procedures, such as a biopsy or surgery * Can provide clearer images of the breast if you have dense breast tissue.  *3D mammography is an optional exam that anyone can have with a 2D mammogram. It doesn't replace or take the place of a 2D mammogram. 2D mammograms remain an effective screening test for all women.  Not all insurance companies cover the cost of a 3D mammogram. Check with your insurance. " "Three-dimensional (3D) mammograms are available at [place] locations in [place], [place], [place], [place], [facility], [place], [place], and [place]. [facility] locations include [facility] [place] in [place]. Benefits of 3D mammograms include: - Improved rate of cancer detection - Decreases your chance of having to go back for more tests, which means fewer: - \"False-positive\" results (This means that there is an abnormal area but it isn't cancer.) - Invasive testing procedures, such as a biopsy or surgery - Can provide clearer images of the breast if you have dense breast tissue. 3D mammography is an optional exam that anyone can have with a 2D mammogram. It doesn't replace or take the place of a 2D mammogram. 2D mammograms remain an effective screening test for all women.  Not all insurance companies cover the cost of a 3D mammogram. Check with your insurance.            Nov 23, 2018  2:00 PM CST   DX HIP/PELVIS/SPINE with UCDX1   [facility]a ([facility] [place])    [address] [place] [address] [phone]           How do I prepare for my exam? (Food and drink instructions) No Food and Drink Restrictions.  How do I prepare for my exam? (Other instructions) Please do not take any of the following 24 hours prior to the day of your exam: vitamins, calcium tablets, antacids.  What should I wear: If possible, please wear clothes without metal (snaps, zippers). A sweat suit works well.  How long does the exam take: The exam takes about 20 minutes.  What should I bring: Bring a list of your current medicines to your exam (including vitamins, minerals and over-the-counter drugs).  Do I need a :  No  is needed.  What should I do after the exam: No restrictions, You may resume normal activities.  How do I prepare for my exam? (Food and drink instructions) A DEXA scan is a bone-density scan. It uses a low level " of radiation to check the strength of your bones. As you lie on a padded table, a machine will take X-rays. We most often scan the hips and lower spine.  Who should I call with questions: If you have any questions, please call the Imaging Department where you will have your exam. Directions, parking instructions, and other information is available on our website, [email or web address].              Future tests that were ordered for you today     Open Future Orders        Priority Expected Expires Ordered    Dexa hip/pelvis/spine* Routine  2019    Mammogram, routine screening Routine  2019    Lipid panel reflex to direct LDL Fasting Routine 2018    Comprehensive metabolic panel Routine 2018    SLEEP EVALUATION & MANAGEMENT REFERRAL - ADULT -Other (Respond in commments) (P) Routine  2019            Who to contact     Please call your clinic at [phone] to:    Ask questions about your health    Make or cancel appointments    Discuss your medicines    Learn about your test results    Speak to your doctor            Additional Information About Your Visit        MyChart Information     [employer] is an electronic gateway that provides easy, online access to your medical records. With "[employer]", you can request a clinic appointment, read your test results, renew a prescription or communicate with your care team.     To sign up for [employer] visit the website at [email or web address]   You will be asked to enter the access code listed below, as well as some personal information. Please follow the directions to create your username and password.     Your access code is: [email or web address]  Expires: 2019  6:31 AM     Your access code will  in 90 days. If you need help or a new code, please contact your [facility] or call [phone] for assistance.        Care EveryWhere ID      This is your Care EveryWhere ID. This could be used by other organizations to access your [place] medical records  [id number]        Your Vitals Were     Pulse Respirations Breastfeeding?             65 18 No          Blood Pressure from Last 3 Encounters:   11/16/18 176/84   11/28/17 149/82   05/11/17 149/80    Weight from Last 3 Encounters:   11/28/17 92 kg (202 lb 14.4 oz)   05/11/17 86.6 kg (190 lb 14.4 oz)   12/08/16 91.2 kg (201 lb)              We Performed the Following     FLU VACCINE HIGH DOSE PRESERVATIVE FREE, AGE =>65 YR     GASTROENTEROLOGY ADULT REF PROCEDURE ONLY          Today's Medication Changes          These changes are accurate as of 11/16/18 12:13 PM.  If you have any questions, ask your nurse or doctor.               These medicines have changed or have updated prescriptions.        Dose/Directions    * losartan 50 MG tablet   Commonly known as:  COZAAR   This may have changed:  Another medication with the same name was added. Make sure you understand how and when to take each.   Used for:  Essential hypertension   Changed by:  Babatunde Mueller MD        Dose:  50 mg   Take 1 tablet (50 mg) by mouth daily   Quantity:  90 tablet   Refills:  1       * losartan 50 MG tablet   Commonly known as:  COZAAR   This may have changed:  Another medication with the same name was added. Make sure you understand how and when to take each.   Used for:  Essential hypertension   Changed by:  Babatunde Mueller MD        Dose:  50 mg   Take 1 tablet (50 mg) by mouth daily Patient needs to see primary provider and have labs for further refills.   Quantity:  90 tablet   Refills:  0       * losartan 100 MG tablet   Commonly known as:  COZAAR   This may have changed:  You were already taking a medication with the same name, and this prescription was added. Make sure you understand how and when to take each.   Used for:  Benign essential hypertension   Changed by:  Babatunde Mueller MD        Dose:  100 mg    Take 1 tablet (100 mg) by mouth daily   Quantity:  90 tablet   Refills:  3       * omeprazole 20 MG CR capsule   Commonly known as:  priLOSEC   This may have changed:  additional instructions   Used for:  Gastroesophageal reflux disease without esophagitis        Take 1 tablet (20 mg) by mouth daily Take 30-60 minutes before a meal.   Quantity:  90 capsule   Refills:  1       * omeprazole 20 MG CR capsule   Commonly known as:  priLOSEC   This may have changed:  Another medication with the same name was changed. Make sure you understand how and when to take each.   Used for:  Gastroesophageal reflux disease without esophagitis        Dose:  20 mg   Take 1 capsule (20 mg) by mouth daily   Quantity:  90 capsule   Refills:  3       * Notice:  This list has 5 medication(s) that are the same as other medications prescribed for you. Read the directions carefully, and ask your doctor or other care provider to review them with you.         Where to get your medicines      These medications were sent to [email or web address] [address] [place] [address] [address] [facility] [address] [address]     Phone:  [phone]     citalopram 20 MG tablet    losartan 100 MG tablet    metoprolol succinate 50 MG 24 hr tablet                Primary Care Provider Office Phone # Fax #    Babatunde Mueller [phone]       [address] [address]        Equal Access to Services     KENAN HUNTER AH: Hadii shonna garcia hadasho Soari, waaxda luqadaha, qaybta kaalmada adeegyada, nikki christianson hayvioleta weaver . So [facility] [phone].    ATENCIÓN: Si habla español, tiene a mathew disposición servicios gratuitos de asistencia lingüística. Llame al [phone].    We comply with applicable federal civil rights laws and [place] laws. We do not discriminate on the basis of race, color, national origin, age, disability, sex, sexual orientation, or  gender identity.            Thank you!     Thank you for choosing [facility]  for your care. Our goal is always to provide you with excellent care. Hearing back from our patients is one way we can continue to improve our services. Please take a few minutes to complete the written survey that you may receive in the mail after your visit with us. Thank you!             Your Updated Medication List - Protect others around you: Learn how to safely use, store and throw away your medicines at www.disposemymeds.org.          This list is accurate as of 11/16/18 12:13 PM.  Always use your most recent med list.                   Brand Name Dispense Instructions for use Diagnosis    aspirin 81 MG tablet      Take 1 tablet by mouth daily.    Unspecified hereditary and idiopathic peripheral neuropathy       CENTRUM SILVER PO      Take  by mouth.    Unspecified hereditary and idiopathic peripheral neuropathy       citalopram 20 MG tablet    celeXA    90 tablet    Take 1 tablet (20 mg) by mouth daily    Other depression       CITRACAL + D PO      Take 1 tablet by mouth daily    Unspecified hereditary and idiopathic peripheral neuropathy       COPPER PO      Take 1 tablet by mouth as needed        cyanocobalamin 2500 MCG sublingual tablet    VITAMIN B-12     Take 1 tablet by mouth daily Taking daily.        econazole nitrate 1 % cream     85 g    Apply topically 2 times daily    Dermatophytosis of nail, Dermatophytosis of foot       ezetimibe 10 MG tablet    ZETIA    90 tablet    Take 1 tablet (10 mg) by mouth daily    Statin intolerance, Hyperlipidemia LDL goal <100       hydrochlorothiazide 12.5 MG Tabs tablet     45 tablet    Take 1 tablet (12.5 mg) by mouth daily Every other day.    Essential hypertension, benign       * losartan 50 MG tablet    COZAAR    90 tablet    Take 1 tablet (50 mg) by mouth daily    Essential hypertension       * losartan 50 MG tablet    COZAAR    90 tablet    Take 1 tablet (50 mg) by  mouth daily Patient needs to see primary provider and have labs for further refills.    Essential hypertension       * losartan 100 MG tablet    COZAAR    90 tablet    Take 1 tablet (100 mg) by mouth daily    Benign essential hypertension       metoprolol succinate 50 MG 24 hr tablet    TOPROL-XL    90 tablet    Take 1 tablet (50 mg) by mouth daily    Benign essential hypertension       minoxidil 2 % external solution    ROGAINE     Apply topically daily        OMEGA 3 PO      Take 1 capsule by mouth daily        * omeprazole 20 MG CR capsule    priLOSEC    90 capsule    Take 1 tablet (20 mg) by mouth daily Take 30-60 minutes before a meal.    Gastroesophageal reflux disease without esophagitis       * omeprazole 20 MG CR capsule    priLOSEC    90 capsule    Take 1 capsule (20 mg) by mouth daily    Gastroesophageal reflux disease without esophagitis       order for DME      Use your CPAP device as directed by your provider.        polyethylene glycol 0.4%- propylene glycol 0.3% 0.4-0.3 % Soln ophthalmic solution    SYSTANE ULTRA     Place 1 drop into both eyes 2 times daily        psyllium 63 % Powd    METAMUCIL SMOOTH TEXTURE    3 Bottle    Take 1 Tablespoonful by mouth 3 times daily Mix in 8 ounces of water    Hyperlipidemia LDL goal <100       SALICYLIC ACID 40%, UREA 20% IN PETROLEUM (FV COMPOUNDED)    CREAM     120 g    Twice daily    Callus of foot       UNABLE TO FIND      daily MEDICATION NAME: Charles Support        Urea 40 % Crea     60 g    Externally apply  topically. Apply to feet daily    Callus of foot       ZANTAC PO      Alternates with Prilosec.        ZYRTEC PO      Take 1 tablet by mouth daily        * Notice:  This list has 5 medication(s) that are the same as other medications prescribed for you. Read the directions carefully, and ask your doctor or other care provider to review them with you.

## 2018-11-16 NOTE — NURSING NOTE
Flu vaccine Questioners:     1.  Has the patient received the information for the influenza vaccine? YES    2.  Does the patient have any of the following contraindications?     Allergy to eggs? No     Allergic reaction to previous influenza vaccines? No     Any other problems to previous influenza vaccines? No     Paralyzed by Guillain-Layton syndrome? No     Currently pregnant? NO     Current moderate or severe illness? No     Allergy to contact lens solution? No    3.  The vaccine has been administered in the usual fashion and the patient was instructed to wait 20 minutes before leaving the building in the event of an allergic reaction: YES      Administered Influenza Fluzone High Dose vaccine (see Immunizations in Chart Review). Patient tolerated well.          Edmundo Baker CMA at 11:54 AM on 11/16/2018

## 2018-11-19 ENCOUNTER — TELEPHONE (OUTPATIENT)
Dept: GASTROENTEROLOGY | Facility: CLINIC | Age: 78
End: 2018-11-19

## 2018-11-20 DIAGNOSIS — K21.9 GASTROESOPHAGEAL REFLUX DISEASE WITHOUT ESOPHAGITIS: ICD-10-CM

## 2018-11-20 DIAGNOSIS — I10 BENIGN ESSENTIAL HYPERTENSION: ICD-10-CM

## 2018-11-21 DIAGNOSIS — F32.89 OTHER DEPRESSION: ICD-10-CM

## 2018-11-23 ENCOUNTER — RADIANT APPOINTMENT (OUTPATIENT)
Dept: BONE DENSITY | Facility: CLINIC | Age: 78
End: 2018-11-23
Attending: FAMILY MEDICINE
Payer: COMMERCIAL

## 2018-11-23 ENCOUNTER — RADIANT APPOINTMENT (OUTPATIENT)
Dept: MAMMOGRAPHY | Facility: CLINIC | Age: 78
End: 2018-11-23
Attending: FAMILY MEDICINE
Payer: COMMERCIAL

## 2018-11-23 DIAGNOSIS — Z78.0 POSTMENOPAUSAL STATUS: ICD-10-CM

## 2018-11-23 DIAGNOSIS — Z00.00 HEALTH CARE MAINTENANCE: ICD-10-CM

## 2018-11-25 RX ORDER — METOPROLOL SUCCINATE 50 MG/1
50 TABLET, EXTENDED RELEASE ORAL DAILY
Qty: 90 TABLET | Refills: 3 | Status: SHIPPED | OUTPATIENT
Start: 2018-11-25 | End: 2020-01-02

## 2018-11-25 NOTE — TELEPHONE ENCOUNTER
metoprolol succinate (TOPROL-XL) 50 MG 24 hr tablet  Pharmacy change    omeprazole (PRILOSEC) 20 MG CR capsule  Last Written Prescription Date:  12/7/17  Last Fill Quantity: 90,   # refills: 3  Last Office Visit :11/16/18  Future Office visit: none

## 2018-11-26 RX ORDER — CITALOPRAM HYDROBROMIDE 20 MG/1
20 TABLET ORAL DAILY
Qty: 90 TABLET | Refills: 3 | Status: SHIPPED | OUTPATIENT
Start: 2018-11-26 | End: 2020-01-02

## 2018-12-10 ENCOUNTER — TELEPHONE (OUTPATIENT)
Dept: GASTROENTEROLOGY | Facility: CLINIC | Age: 78
End: 2018-12-10

## 2018-12-10 NOTE — TELEPHONE ENCOUNTER
with request pt contact Endoscopy Pre-assessment RN to review upcoming procedure information.      Telephone call-back number provided.  Pop RN  [facility]    Additional Information regarding appointment:   Patient scheduled for:  Colonoscopy  Indication for procedure: Screening  Date/Arrival time: 12-17-18 @10:25 am  Procedure Provider:  Dr. North  Referring Provider. Dr. Ervin Marcano Type: [facility]  Facility location:    [address] [place] [address], 5th floor   Anticoagulants or blood thinners: no  ________________________________

## 2019-01-02 NOTE — PROGRESS NOTES
"[facility] [place]   Babatunde Mueller MD  01/03/2019      Chief Complaint:   Musculoskeletal Problem     History of Present Illness:   Jessica Loaiza is a 78 year old female with a history of idiopathic polyneuropathy, obstructive sleep apnea, and hyperlipidemia  who presents for evaluation of a musculoskeletal problem.    Musculoskeletal problem  The patient states that she has had left shoulder pain since November, but forgot to mention it--this started in August or September. Then, three days ago, she began to tingling and discomfort, rather than pain, down her left arm. The tingling is more in her forearm than in her upper arm, and is in all five of her fingers she thinks. There is no numbness. Initially, this was intermittent, but now it is constant. The shoulder pain is aggravated by lying on the arm, but she is able to move it as normal. Her arm discomfort and tingling does not wake her at night, and she has not found anything that makes it better. Her neck is not stiff, but she has some mild cervical discomfort. Of note, the patient is right hand dominant. Of note, the patient states that Dr. Narvaez at [facility] told the patient that \"the muscles in her arms are where her nerves should be and her nerves are where her muscles should be.\"     Other concerns discussed:  1) Lymph nodes - She states that her cervical lymph nodes bilaterally are still mildly painful--\"sensitive\" she states--and she is wondering what these would be.    Review of Systems:   Pertinent items are noted in HPI, remainder of complete ROS is negative.      Active Medications:      aspirin 81 MG tablet, Take 1 tablet by mouth daily., Disp: , Rfl:      Calcium Citrate-Vitamin D (CITRACAL + D PO), Take 1 tablet by mouth daily , Disp: , Rfl:      Cetirizine HCl (ZYRTEC PO), Take 1 tablet by mouth daily, Disp: , Rfl:      citalopram (CELEXA) 20 MG tablet, Take 1 tablet (20 mg) by mouth daily, Disp: 90 tablet, Rfl: " 3     citalopram (CELEXA) 20 MG tablet, Take 1 tablet (20 mg) by mouth daily, Disp: 90 tablet, Rfl: 3     COPPER PO, Take 1 tablet by mouth as needed , Disp: , Rfl:      cyabnocobalamin (VITAMIN B-12) 2500 MCG sublingual tablet, Take 1 tablet by mouth daily Taking daily., Disp: , Rfl:      econazole nitrate 1 % cream, Apply topically 2 times daily, Disp: 85 g, Rfl: 5     losartan (COZAAR) 100 MG tablet, Take 1 tablet (100 mg) by mouth daily, Disp: 90 tablet, Rfl: 3     losartan (COZAAR) 50 MG tablet, Take 1 tablet (50 mg) by mouth daily Patient needs to see primary provider and have labs for further refills., Disp: 90 tablet, Rfl: 0     losartan (COZAAR) 50 MG tablet, Take 1 tablet (50 mg) by mouth daily, Disp: 90 tablet, Rfl: 1     metoprolol succinate (TOPROL-XL) 50 MG 24 hr tablet, Take 1 tablet (50 mg) by mouth daily, Disp: 90 tablet, Rfl: 3     metoprolol succinate (TOPROL-XL) 50 MG 24 hr tablet, Take 1 tablet (50 mg) by mouth daily, Disp: 90 tablet, Rfl: 3     minoxidil (ROGAINE) 2 % external solution, Apply topically daily , Disp: , Rfl:      Multiple Vitamins-Minerals (CENTRUM SILVER PO), Take  by mouth., Disp: , Rfl:      Omega-3 Fatty Acids (OMEGA 3 PO), Take 1 capsule by mouth daily , Disp: , Rfl:      omeprazole (PRILOSEC) 20 MG CR capsule, Take 1 capsule (20 mg) by mouth daily, Disp: 90 capsule, Rfl: 3     omeprazole (PRILOSEC) 20 MG CR capsule, Take 1 tablet (20 mg) by mouth daily Take 30-60 minutes before a meal. (Patient taking differently: Take 1 tablet (20 mg) by mouth daily Take 30-60 minutes before a meal.  Taking every other day. Alternates with Zantac.), Disp: 90 capsule, Rfl: 1     ORDER FOR DME, Use your CPAP device as directed by your provider., Disp: , Rfl:      polyethylene glycol 0.4%- propylene glycol 0.3% (SYSTANE ULTRA) 0.4-0.3 % SOLN ophthalmic solution, Place 1 drop into both eyes 2 times daily, Disp: , Rfl:      psyllium (METAMUCIL SMOOTH TEXTURE) 63 % POWD, Take 1 Tablespoonful by  mouth 3 times daily Mix in 8 ounces of water, Disp: 3 Bottle, Rfl: 3     RaNITidine HCl (ZANTAC PO), Alternates with Prilosec., Disp: , Rfl:      SALICYLIC ACID 40%, UREA 20% IN PETROLEUM, FV COMPOUNDED,    CREAM, Twice daily, Disp: 120 g, Rfl: 3     UNABLE TO FIND, daily MEDICATION NAME: Charles Support, Disp: , Rfl:      Urea 40 % CREA, Externally apply  topically. Apply to feet daily, Disp: 60 g, Rfl: 3     Allergies:   Gabapentin; Ibuprofen sodium; and Simvastatin      Past Medical History:  Cataracts, bilateral   Dyslipidemia   Hypertension  Statin intolerance   Female stress incontinence   Callus of foot   Inflamed seborrheic keratosis   Dermatitis   Blister  Obstructive Sleep Apnea   Disturbance of sleep behavior  Periodic limb movement disorder  Hyperlipidemia       Past Surgical History:  History reviewed. No pertinent past surgical history.     Family History:    History reviewed. No pertinent family history.       Social History:   The patient was alone.  Smoking Status: Never   Smokeless Tobacco: Never   Alcohol Use: Yes       Physical Exam:   /85   Pulse 91   Wt 92.3 kg (203 lb 6.4 oz)   BMI 38.12 kg/m       Constitutional: Alert. In no distress.  Head: Normocephalic. No masses, lesions, tenderness or abnormalities.  ENT: No neck nodes or sinus tenderness. Full ROM without pain or limitation. No adenopathy. She has mild tenderness in the anterior cervical lymph node areas but I could no discern any mass or enlargement.   Musculoskeletal: Extremities normal. No gross deformities noted. Normal muscle tone. Left hand: negative Phalen's and keysha's. Full strength and vascular status. Left shoulder: pain with abduction/external rotation.   Skin: No suspicious lesions. No rashes.  Neurologic: Gait normal. Reflexes normal and symmetric. Sensation grossly WNL.  Psychiatric: Mentation appears normal. Normal affect.      Assessment and Plan:  Pain of left hand  Three days left forearm and hand neuralgias.  Likely carpal tunnel. She will try sleeping  Splint and if not better in one week or two, see her orthopedist.   - order for DME  Dispense: 1 Device; Refill: 0    Shoulder pain  See preferred orthopedist at [facility]. PT or cortisone shot would be helpful. Likely rotator cuff strain.      Follow-up: Data Unavailable         Scribe Disclosure:   I, Giuliana Nichole, am serving as a scribe to document services personally performed by Babatunde Mueller MD at this visit, based upon the provider's statements to me. All documentation has been reviewed by the aforementioned provider prior to being entered into the official medical record.     Portions of this medical record were completed by a scribe. UPON MY REVIEW AND AUTHENTICATION BY ELECTRONIC SIGNATURE, this confirms (a) I performed the applicable clinical services, and (b) the record is accurate.   Babatunde Mueller MD

## 2019-01-03 ENCOUNTER — OFFICE VISIT (OUTPATIENT)
Dept: FAMILY MEDICINE | Facility: CLINIC | Age: 79
End: 2019-01-03
Payer: COMMERCIAL

## 2019-01-03 VITALS
BODY MASS INDEX: 38.12 KG/M2 | SYSTOLIC BLOOD PRESSURE: 143 MMHG | DIASTOLIC BLOOD PRESSURE: 85 MMHG | WEIGHT: 203.4 LBS | HEART RATE: 91 BPM

## 2019-01-03 DIAGNOSIS — M79.642 PAIN OF LEFT HAND: Primary | ICD-10-CM

## 2019-01-03 DIAGNOSIS — M25.512 LEFT SHOULDER PAIN, UNSPECIFIED CHRONICITY: ICD-10-CM

## 2019-01-03 ASSESSMENT — PAIN SCALES - GENERAL: PAINLEVEL: MILD PAIN (2)

## 2019-01-03 NOTE — NURSING NOTE
Chief Complaint   Patient presents with     Musculoskeletal Problem     pt states having pain left shoulder down the arm        Renu Khan CMA at 2:18 PM on 1/3/2019.

## 2019-01-20 DIAGNOSIS — I10 ESSENTIAL HYPERTENSION: ICD-10-CM

## 2019-01-21 RX ORDER — LOSARTAN POTASSIUM 50 MG/1
50 TABLET ORAL DAILY
Qty: 90 TABLET | Refills: 3 | Status: SHIPPED | OUTPATIENT
Start: 2019-01-21 | End: 2020-10-21

## 2019-01-21 NOTE — TELEPHONE ENCOUNTER
Losartan 50 mg tab po every day          Called medication refill to pharmacy. Called in 90 tabs with 3 refills.

## 2019-05-23 ENCOUNTER — TELEPHONE (OUTPATIENT)
Dept: FAMILY MEDICINE | Facility: CLINIC | Age: 79
End: 2019-05-23

## 2019-05-23 DIAGNOSIS — R60.9 EDEMA: Primary | ICD-10-CM

## 2019-05-23 RX ORDER — HYDROCHLOROTHIAZIDE 25 MG/1
25 TABLET ORAL DAILY
Qty: 30 TABLET | Refills: 0 | Status: SHIPPED | OUTPATIENT
Start: 2019-05-23 | End: 2021-08-25

## 2019-05-23 NOTE — LETTER
Patient:  Lina Alfonso  :   1940  MRN:     [id number]        Ms. Lina Alfonso  [address] [address]        May 23, 2019    Dear Ms. Alfonso,    Thank you for choosing the [facility] for your healthcare needs.  We appreciate the opportunity to serve you.    The following are your recent test results.     Component      Latest Ref Rng & Units 2018   Sodium      133 - 144 mmol/L 138   Potassium      3.4 - 5.3 mmol/L 4.3   Chloride      94 - 109 mmol/L 104   Carbon Dioxide      20 - 32 mmol/L 28   Anion Gap      3 - 14 mmol/L 6   Glucose      70 - 99 mg/dL 111 (H)   Urea Nitrogen      7 - 30 mg/dL 18   Creatinine      0.52 - 1.04 mg/dL 0.75   GFR Estimate      >60 mL/min/1.7m2 76   GFR Estimate If Black      >60 mL/min/1.7m2 >90   Calcium      8.5 - 10.1 mg/dL 9.2   Bilirubin Total      0.2 - 1.3 mg/dL 0.6   Albumin      3.4 - 5.0 g/dL 3.7   Protein Total      6.8 - 8.8 g/dL 7.4   Alkaline Phosphatase      40 - 150 U/L 79   ALT      0 - 50 U/L 26   AST      0 - 45 U/L 17   Cholesterol      <200 mg/dL 246 (H)   Triglycerides      <150 mg/dL 187 (H)   HDL Cholesterol      >49 mg/dL 41 (L)   LDL Cholesterol Calculated      <100 mg/dL 167 (H)   Non HDL Cholesterol      <130 mg/dL 204 (H)       Results for orders placed or performed in visit on 18   Dexa hip/pelvis/spine*    Narrative    [facility]   [address] [place] [id number]  Phone: [phone]   Fax: [phone]     Patient name:   LINA ALFONSO ([id number] )  Patient demographics[age] year old White Female of 61.3 in. height and   202.0 lbs. weight  Ordering provider: THAIS ALLEN  History:  HYSTERECTOMY, LOW BONE DENSITY, POSTMENOPAUSAL  Current treatments: Calcium  Scan:  DXA exam ([id number] ): [email or web address]   Exam date:  2018   Comparison:    11/23/2018  10/27/2016  04/27/2011  02/18/2008  02/18/2008    Dual energy x-ray absorptiometry (DXA) results:    Region Date BMD T - score Z - score BMD change from baseline BMD % change   from baseline BMD change from previous BMD % change from previous                L1-L4 11/23/2018 1.192 g/cm  0.1 1.0 0.081 g/cm  7.3% 0.048 g/cm  4.2%   L1-L4 10/27/2016 1.144 g/cm          L1-L4 04/27/2011 1.134 g/cm          L1-L4 02/18/2008 1.108 g/cm          L1-L4 02/18/2008 1.111 g/cm    baseline baseline - -     Neck Left 11/23/2018 0.827 g/cm  -1.5 -0.1       Neck Left 10/27/2016 0.855 g/cm          Neck Left 04/27/2011 0.933 g/cm          Neck Left 02/18/2008 0.979 g/cm            Total Left 11/23/2018 1.011 g/cm  0.0 1.2 -0.049 g/cm  -4.6% -0.004 g/cm    -0.4%   Total Left 10/27/2016 1.015 g/cm          Total Left 04/27/2011 1.041 g/cm          Total Left 02/18/2008 1.060 g/cm    baseline baseline - -                Neck Right 11/23/2018 0.841 g/cm  -1.4 0.0       Neck Right 10/27/2016 0.881 g/cm          Neck Right 04/27/2011 0.970 g/cm          Neck Right 02/18/2008 0.971 g/cm            Total Right 11/23/2018 0.989 g/cm  -0.1 1.0 -0.086 g/cm  -8.0% -0.048   g/cm  -4.6%   Total Right 10/27/2016 1.037 g/cm          Total Right 04/27/2011 1.055 g/cm          Total Right 02/18/2008 1.075 g/cm    baseline baseline - -                   Conclusions:  The most negative and valid T-score of -1.5 at the level of the left   femoral neck, corresponds low bone density.    Comparisons: comparisons were made only to the previous and baseline   studies.   *Taking into account the precision errors (reference 2) for this center   These calculated changes in BMD to the previous (2016) exam are   significant:  4.2%   at the level of the lumbar spine,   -4.6%   at the level of the right total hip,     **These calculated changes in BMD to the baseline (2008) exam are   significant:  7.3%   at the level of the lumbar spine    -4.6%  at  the level of the left total hip,  -8.0% at the level of the right total hip    Please note that the differential diagnosis of BMD increase in the spine   includes improvement due to pharmacotherapy vs inter-current progression   of spine degeneration or fracture    The risk of osteoporotic fracture increases approximately 2-fold for each   1.0 SD decrease in T-score.  Low bone density is not the only risk factor   for fracture; consider factors such as patient's age, fall risk, injury   risk, previous osteoporotic fracture, family history of osteoporosis, etc.    People with an elevated risk of fracture should be regularly evaluated   for low bone mineral density.    Bone densitometry cannot rule out secondary causes of bone loss.   Therefore, further metabolic testing to look for secondary causes of low   BMD should be performed if indicated.     Repeat DXA testing in 2 years could be considered. Clinical correlation is   strongly recommended     Feel free to contact DXA services if you have any questions or comments.    Thank you for the opportunity to be of service to you and your patient.    Principal result :  MD Janae Don MD, [facility]  Division of Diabetes, Endocrinology and Metabolism  [facility]  [phone]    References:  1. ISCD position statements:  www.iscd.org  (includes the report of the   2015  Position Development Conference)  2. LSC = least significant changes at the [facility]   AP spine =  0.032 g/cm2  (6.26.2007)   Left hip = 0.029 g/cm2  (6.15.2007)   Right hip = 0.018 g/cm2  (6.15.2007)   Left mid radius = 0.043 g/cm2  (6.26.2007)   Lateral spine region = pending   Total body = pending     Technical comments:   Satisfactory.                Please contact your provider if you have any questions or concerns.  We look forward to serving your needs in the future.      Sincerely,  Dr. Mueller/MB

## 2019-06-06 ENCOUNTER — OFFICE VISIT (OUTPATIENT)
Dept: FAMILY MEDICINE | Facility: CLINIC | Age: 79
End: 2019-06-06
Payer: COMMERCIAL

## 2019-06-06 VITALS
OXYGEN SATURATION: 94 % | DIASTOLIC BLOOD PRESSURE: 83 MMHG | SYSTOLIC BLOOD PRESSURE: 148 MMHG | HEART RATE: 63 BPM | WEIGHT: 200.7 LBS | BODY MASS INDEX: 37.61 KG/M2 | TEMPERATURE: 98.5 F

## 2019-06-06 DIAGNOSIS — M65.4 TENOSYNOVITIS, DE QUERVAIN: ICD-10-CM

## 2019-06-06 DIAGNOSIS — R60.9 EDEMA, UNSPECIFIED TYPE: Primary | ICD-10-CM

## 2019-06-06 DIAGNOSIS — R60.9 EDEMA, UNSPECIFIED TYPE: ICD-10-CM

## 2019-06-06 LAB
ALBUMIN SERPL-MCNC: 3.7 G/DL (ref 3.4–5)
ALP SERPL-CCNC: 80 U/L (ref 40–150)
ALT SERPL W P-5'-P-CCNC: 22 U/L (ref 0–50)
ANION GAP SERPL CALCULATED.3IONS-SCNC: 8 MMOL/L (ref 3–14)
AST SERPL W P-5'-P-CCNC: 14 U/L (ref 0–45)
BASOPHILS # BLD AUTO: 0.1 10E9/L (ref 0–0.2)
BASOPHILS NFR BLD AUTO: 0.9 %
BILIRUB SERPL-MCNC: 0.7 MG/DL (ref 0.2–1.3)
BUN SERPL-MCNC: 16 MG/DL (ref 7–30)
CALCIUM SERPL-MCNC: 9.2 MG/DL (ref 8.5–10.1)
CHLORIDE SERPL-SCNC: 103 MMOL/L (ref 94–109)
CO2 SERPL-SCNC: 26 MMOL/L (ref 20–32)
CREAT SERPL-MCNC: 0.69 MG/DL (ref 0.52–1.04)
DIFFERENTIAL METHOD BLD: NORMAL
EOSINOPHIL # BLD AUTO: 0 10E9/L (ref 0–0.7)
EOSINOPHIL NFR BLD AUTO: 0.1 %
ERYTHROCYTE [DISTWIDTH] IN BLOOD BY AUTOMATED COUNT: 12.3 % (ref 10–15)
GFR SERPL CREATININE-BSD FRML MDRD: 84 ML/MIN/{1.73_M2}
GLUCOSE SERPL-MCNC: 100 MG/DL (ref 70–99)
HCT VFR BLD AUTO: 43.3 % (ref 35–47)
HGB BLD-MCNC: 14.6 G/DL (ref 11.7–15.7)
IMM GRANULOCYTES # BLD: 0 10E9/L (ref 0–0.4)
IMM GRANULOCYTES NFR BLD: 0.2 %
LYMPHOCYTES # BLD AUTO: 2.5 10E9/L (ref 0.8–5.3)
LYMPHOCYTES NFR BLD AUTO: 30.9 %
MCH RBC QN AUTO: 30.2 PG (ref 26.5–33)
MCHC RBC AUTO-ENTMCNC: 33.7 G/DL (ref 31.5–36.5)
MCV RBC AUTO: 90 FL (ref 78–100)
MONOCYTES # BLD AUTO: 0.7 10E9/L (ref 0–1.3)
MONOCYTES NFR BLD AUTO: 7.9 %
NEUTROPHILS # BLD AUTO: 4.9 10E9/L (ref 1.6–8.3)
NEUTROPHILS NFR BLD AUTO: 60 %
NRBC # BLD AUTO: 0 10*3/UL
NRBC BLD AUTO-RTO: 0 /100
PLATELET # BLD AUTO: 274 10E9/L (ref 150–450)
POTASSIUM SERPL-SCNC: 4.1 MMOL/L (ref 3.4–5.3)
PROT SERPL-MCNC: 7.2 G/DL (ref 6.8–8.8)
RBC # BLD AUTO: 4.83 10E12/L (ref 3.8–5.2)
SODIUM SERPL-SCNC: 137 MMOL/L (ref 133–144)
TSH SERPL DL<=0.005 MIU/L-ACNC: 1 MU/L (ref 0.4–4)
WBC # BLD AUTO: 8.2 10E9/L (ref 4–11)

## 2019-06-06 ASSESSMENT — ANXIETY QUESTIONNAIRES
5. BEING SO RESTLESS THAT IT IS HARD TO SIT STILL: NOT AT ALL
GAD7 TOTAL SCORE: 3
2. NOT BEING ABLE TO STOP OR CONTROL WORRYING: SEVERAL DAYS
7. FEELING AFRAID AS IF SOMETHING AWFUL MIGHT HAPPEN: NOT AT ALL
6. BECOMING EASILY ANNOYED OR IRRITABLE: NOT AT ALL
3. WORRYING TOO MUCH ABOUT DIFFERENT THINGS: SEVERAL DAYS
IF YOU CHECKED OFF ANY PROBLEMS ON THIS QUESTIONNAIRE, HOW DIFFICULT HAVE THESE PROBLEMS MADE IT FOR YOU TO DO YOUR WORK, TAKE CARE OF THINGS AT HOME, OR GET ALONG WITH OTHER PEOPLE: SOMEWHAT DIFFICULT
1. FEELING NERVOUS, ANXIOUS, OR ON EDGE: SEVERAL DAYS

## 2019-06-06 ASSESSMENT — PAIN SCALES - GENERAL: PAINLEVEL: MILD PAIN (3)

## 2019-06-06 ASSESSMENT — PATIENT HEALTH QUESTIONNAIRE - PHQ9
SUM OF ALL RESPONSES TO PHQ QUESTIONS 1-9: 4
5. POOR APPETITE OR OVEREATING: NOT AT ALL

## 2019-06-06 NOTE — PATIENT INSTRUCTIONS
[facility] Medication Refill Request Information:  * Please contact your pharmacy regarding ANY request for medication refills.  ** [facility] Prescription Fax = [phone]  * Please allow 3 business days for routine medication refills.  * Please allow 5 business days for controlled substance medication refills.     [facility] Test Result notification information:  *You will be notified with in 7-10 days of your appointment day regarding the results of your test.  If you are on MyChart you will be notified as soon as the provider has reviewed the results and signed off on them.    [facility]: [phone]

## 2019-06-06 NOTE — NURSING NOTE
Chief Complaint   Patient presents with     Recheck Medication     pt here to discuss medications     Musculoskeletal Problem     pt states having pain and swelling in right wrist       Renu Khan CMA at 1:19 PM on 6/6/2019.

## 2019-06-06 NOTE — PROGRESS NOTES
[facility]  [facility]   Babatunde Mueller MD  06/06/2019      Chief Complaint:   Recheck Medication and Musculoskeletal Problem       History of Present Illness:   Jessica Loaiza is a 78 year old female with a history of hyperlipidemia, HTN, MARCELO and depression who presents for follow up for medication and musculoskeletal problem.     Right knee pain: She has not had motivation to exercise as she has so much pain in her right knee.     Leg and finger edema: She was not taking her Hydrochlorothiazide 12.5 mg since August 2018. She called the clinic 05/23 requesting to go back on a higher dose due to swelling in her legs and fingers. She did not try to remove her rings but thinks that she would not be able to if she tried. She had a hard time putting on her shoes but did not want to walk barefoot due to her polyneuropathy. She was started on Hydrochlorothiazide 25 mg but her swelling did not alleviate. She denied shortness of breath both with standing or laying.      Wrist tendonitis: For the past 1-2 months she has noticed pain in her right wrist which she rates a 3/10 today. She denies injury.     Other:  1. She is going to buy an electric bike so she can use it at home.   2. She will  Zantac on her way home- 3 episodes of GERD and took Tums but discontinued last night.   3. Not taking Charles Support because it was taken off the market.      Review of Systems:   Pertinent items are noted in HPI, remainder of complete ROS is negative.      Active Medications:      aspirin 81 MG tablet, Take 1 tablet by mouth daily., Disp: , Rfl:      Calcium Citrate-Vitamin D (CITRACAL + D PO), Take 1 tablet by mouth daily , Disp: , Rfl:      Cetirizine HCl (ZYRTEC PO), Take 1 tablet by mouth daily, Disp: , Rfl:      citalopram (CELEXA) 20 MG tablet, Take 1 tablet (20 mg) by mouth daily, Disp: 90 tablet, Rfl: 3     citalopram (CELEXA) 20 MG tablet, Take 1 tablet (20 mg) by mouth daily, Disp: 90 tablet, Rfl: 3      COPPER PO, Take 1 tablet by mouth as needed , Disp: , Rfl:      cyabnocobalamin (VITAMIN B-12) 2500 MCG sublingual tablet, Take 1 tablet by mouth daily Taking daily., Disp: , Rfl:      econazole nitrate 1 % cream, Apply topically 2 times daily, Disp: 85 g, Rfl: 5     hydrochlorothiazide (HYDRODIURIL) 25 MG tablet, Take 1 tablet (25 mg) by mouth daily, Disp: 30 tablet, Rfl: 0     hydrochlorothiazide 12.5 MG TABS tablet, Take 1 tablet (12.5 mg) by mouth daily Every other day., Disp: 45 tablet, Rfl: 1     losartan (COZAAR) 100 MG tablet, Take 1 tablet (100 mg) by mouth daily, Disp: 90 tablet, Rfl: 3     metoprolol succinate (TOPROL-XL) 50 MG 24 hr tablet, Take 1 tablet (50 mg) by mouth daily, Disp: 90 tablet, Rfl: 3     metoprolol succinate (TOPROL-XL) 50 MG 24 hr tablet, Take 1 tablet (50 mg) by mouth daily, Disp: 90 tablet, Rfl: 3     minoxidil (ROGAINE) 2 % external solution, Apply topically daily , Disp: , Rfl:      Multiple Vitamins-Minerals (CENTRUM SILVER PO), Take  by mouth., Disp: , Rfl:      Omega-3 Fatty Acids (OMEGA 3 PO), Take 1 capsule by mouth daily , Disp: , Rfl:      omeprazole (PRILOSEC) 20 MG CR capsule, Take 1 tablet (20 mg) by mouth daily Take 30-60 minutes before a meal. (Patient taking differently: Take 1 tablet (20 mg) by mouth daily Take 30-60 minutes before a meal.  Taking every other day. Alternates with Zantac.), Disp: 90 capsule, Rfl: 1     polyethylene glycol 0.4%- propylene glycol 0.3% (SYSTANE ULTRA) 0.4-0.3 % SOLN ophthalmic solution, Place 1 drop into both eyes 2 times daily, Disp: , Rfl:      psyllium (METAMUCIL SMOOTH TEXTURE) 63 % POWD, Take 1 Tablespoonful by mouth 3 times daily Mix in 8 ounces of water, Disp: 3 Bottle, Rfl: 3     RaNITidine HCl (ZANTAC PO), Alternates with Prilosec., Disp: , Rfl:      SALICYLIC ACID 40%, UREA 20% IN PETROLEUM, FV COMPOUNDED,    CREAM, Twice daily, Disp: 120 g, Rfl: 3     UNABLE TO FIND, daily MEDICATION NAME: Charles Support, Disp: , Rfl:      Urea  40 % CREA, Externally apply  topically. Apply to feet daily, Disp: 60 g, Rfl: 3     ezetimibe (ZETIA) 10 MG tablet, Take 1 tablet (10 mg) by mouth daily (Patient not taking: Reported on 11/28/2017), Disp: 90 tablet, Rfl: 3     losartan (COZAAR) 50 MG tablet, TAKE 1 TABLET (50 MG) BY MOUTH DAILY PATIENT NEEDS TO SEE PRIMARY PROVIDER AND HAVE LABS FOR FURTHER REFILLS. (Patient not taking: Reported on 6/6/2019), Disp: 90 tablet, Rfl: 3     losartan (COZAAR) 50 MG tablet, Take 1 tablet (50 mg) by mouth daily (Patient not taking: Reported on 6/6/2019), Disp: 90 tablet, Rfl: 1     omeprazole (PRILOSEC) 20 MG CR capsule, Take 1 capsule (20 mg) by mouth daily (Patient not taking: Reported on 6/6/2019), Disp: 90 capsule, Rfl: 3      Allergies:   Gabapentin; Ibuprofen sodium; and Simvastatin      Past Medical History:  Hyperlipidemia   Hypertension   Obstructive sleep apnea   Idiopathic progressive polyneuropathy   Female stress incontinence   Depression   Cataracts   Seborrheic keratosis   Acute poliomyelitis      Past Surgical History:  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 In no distress.  Head: Normocephalic. No masses, lesions, tenderness or abnormalities.  ENT: No neck nodes or sinus tenderness.  Cardiovascular: RRR. No murmurs, clicks, gallops, or rub.  Respiratory: Clear to auscultation bilaterally, no wheezes or crackles.  Gastrointestinal: Abdomen soft. Non-tender. BS normal. No masses or organomegaly.  Musculoskeletal: Extremities normal. No gross deformities noted. Normal muscle tone. Trace  edema bilaterally. Positive right wrist finkelstein test.   Psychiatric: Mentation appears normal. Normal affect.      Assessment and Plan:  Edema, unspecified type  Much better on increased dose of hydrochlorothiazide. She will watch her salt intake. Lab work today and will have follow up if needed.  Will have blood work today.   - CBC with platelets differential  - TSH with free T4 reflex  - Comprehensive metabolic panel    Tenosynovitis, de Quervain  She will splint and ice for now; will visit sports medicine to discuss physical therapy or Cortisone injection.   - order for DME  Dispense: 1 Device; Refill: 0     Follow-up: No follow-ups on file.      Babatunde Mueller MD     Scribe Disclosure:  I, Olinda Winslow, am serving as a scribe to document services personally performed by Babatunde Mueller MD at this visit, based upon the provider's statements to me. All documentation has been reviewed by the aforementioned provider prior to being entered into the official medical record.    Scribe Preparation Attestation:  Olinda WHITFIELD, a scribe, prepared the chart for today's encounter.      Portions of this medical record were completed by a scribe. UPON MY REVIEW AND AUTHENTICATION BY ELECTRONIC SIGNATURE, this confirms (a) I performed the applicable clinical services, and (b) the record is accurate.   Babatunde Mueller MD

## 2019-06-07 ASSESSMENT — ANXIETY QUESTIONNAIRES: GAD7 TOTAL SCORE: 3

## 2019-10-01 ENCOUNTER — TELEPHONE (OUTPATIENT)
Dept: FAMILY MEDICINE | Facility: CLINIC | Age: 79
End: 2019-10-01

## 2019-10-01 NOTE — TELEPHONE ENCOUNTER
Spoke to patient to relay diagnosis that was given to patient on her visit from 6/6/19 for her wrist/hand pain: Tenosynovitis, de Quervain, as the patient will be going to an orthopedic specialist and needed to relay information to provider. Lisa Wu LPN 10/1/2019 2:36 PM

## 2019-10-01 NOTE — TELEPHONE ENCOUNTER
[facility] Call Center    Phone Message    May a detailed message be left on voicemail: yes    Reason for Call: Pt requesting call back from [facility] regarding what diagnosis Dr. Mueller gave her last year at her physical for cordisone injection for hand/arm/wrist pain. Pt needs to make an appointment, but could not remember the diagnosis Dr. Mueller gave her. Please call. Okay to leave message if unable to reach her.     Action Taken: Message routed to:  [facility]

## 2019-12-28 DIAGNOSIS — K21.9 GASTROESOPHAGEAL REFLUX DISEASE WITHOUT ESOPHAGITIS: ICD-10-CM

## 2019-12-28 DIAGNOSIS — I10 BENIGN ESSENTIAL HYPERTENSION: ICD-10-CM

## 2019-12-28 DIAGNOSIS — F32.89 OTHER DEPRESSION: ICD-10-CM

## 2020-01-02 RX ORDER — LOSARTAN POTASSIUM 100 MG/1
100 TABLET ORAL DAILY
Qty: 90 TABLET | Refills: 0 | Status: SHIPPED | OUTPATIENT
Start: 2020-01-02 | End: 2020-04-09

## 2020-01-02 RX ORDER — CITALOPRAM HYDROBROMIDE 20 MG/1
20 TABLET ORAL DAILY
Qty: 90 TABLET | Refills: 0 | Status: SHIPPED | OUTPATIENT
Start: 2020-01-02 | End: 2020-04-01

## 2020-01-02 RX ORDER — METOPROLOL SUCCINATE 50 MG/1
50 TABLET, EXTENDED RELEASE ORAL DAILY
Qty: 90 TABLET | Refills: 0 | Status: SHIPPED | OUTPATIENT
Start: 2020-01-02 | End: 2020-04-01

## 2020-01-02 NOTE — TELEPHONE ENCOUNTER
LOSARTAN 100MG TABLET,   METOPROLOL SUCC 50MG ER TABLET , CITALOPRAM 20MG TABLET   Last Written Prescription Date:  11/16-11/26/18  Last Fill Quantity: 90,   # refills: 3  Last Office Visit : 6/6/19, no recommended follow up  Future Office visit:  None scheduled    Routing refill request to provider for review/approval because:  Blood pressure out of range   BP Readings from Last 3 Encounters:   06/06/19 148/83   01/03/19 143/85   11/16/18 176/84     90 day josh of each medication has been sent, routing to clinic to follow up on over due PHQ-9 and due to elevated BP for provider follow up

## 2020-03-31 DIAGNOSIS — I10 BENIGN ESSENTIAL HYPERTENSION: ICD-10-CM

## 2020-03-31 DIAGNOSIS — F32.89 OTHER DEPRESSION: ICD-10-CM

## 2020-04-01 RX ORDER — METOPROLOL SUCCINATE 50 MG/1
50 TABLET, EXTENDED RELEASE ORAL DAILY
Qty: 90 TABLET | Refills: 0 | Status: SHIPPED | OUTPATIENT
Start: 2020-04-01 | End: 2020-07-01

## 2020-04-01 RX ORDER — CITALOPRAM HYDROBROMIDE 20 MG/1
20 TABLET ORAL DAILY
Qty: 90 TABLET | Refills: 0 | Status: SHIPPED | OUTPATIENT
Start: 2020-04-01 | End: 2020-07-01

## 2020-04-01 NOTE — TELEPHONE ENCOUNTER
metoprolol succinate ER (TOPROL-XL) 50 MG 24 hr tablet    Last Written Prescription Date:  1/2/20  Last Fill Quantity: 90   # refills: 0  Last Office Visit : 6/6/19  Future Office visit:  None     90 day to pharmacy       citalopram (CELEXA) 20 MG tablet  Last Written Prescription Date:  1/2/20  Last Fill Quantity: 90,   # refills: 0   90 day to pharmacy      bp > 140/90,  phq9

## 2020-04-06 DIAGNOSIS — I10 BENIGN ESSENTIAL HYPERTENSION: ICD-10-CM

## 2020-04-09 RX ORDER — LOSARTAN POTASSIUM 100 MG/1
100 TABLET ORAL DAILY
Qty: 90 TABLET | Refills: 0 | Status: SHIPPED | OUTPATIENT
Start: 2020-04-09 | End: 2020-07-01

## 2020-04-09 NOTE — TELEPHONE ENCOUNTER
losartan (COZAAR) 100 MG tablet      Last Written Prescription Date:  1/2/20  Last Fill Quantity: 90,   # refills: 0  Last Office Visit : 6/6/19  Future Office visit:  none  90 day to pharmacy.     Routing FYI because:  Blood pressure out of range   06/06/19 148/83

## 2020-06-25 DIAGNOSIS — K21.9 GASTROESOPHAGEAL REFLUX DISEASE WITHOUT ESOPHAGITIS: ICD-10-CM

## 2020-06-29 NOTE — TELEPHONE ENCOUNTER
omeprazole (PRILOSEC) 20 MG DR capsule  Take 1 capsule (20 mg) by mouth daily       Last Written Prescription Date:  1/2/20  Last Fill Quantity: 90,   # refills: 1  Last Office Visit : 6/6/19  Future Office visit: none    90 day josh to pharmacy.     Routing because:  Overdue office visit     Scheduling has been notified to contact the pt for appointment.

## 2020-09-02 DIAGNOSIS — F32.89 OTHER DEPRESSION: ICD-10-CM

## 2020-09-02 DIAGNOSIS — I10 BENIGN ESSENTIAL HYPERTENSION: ICD-10-CM

## 2020-09-03 DIAGNOSIS — I10 BENIGN ESSENTIAL HYPERTENSION: ICD-10-CM

## 2020-09-08 RX ORDER — CITALOPRAM HYDROBROMIDE 20 MG/1
20 TABLET ORAL DAILY
Qty: 14 TABLET | Refills: 0 | Status: SHIPPED | OUTPATIENT
Start: 2020-09-08 | End: 2020-09-30

## 2020-09-08 RX ORDER — LOSARTAN POTASSIUM 100 MG/1
100 TABLET ORAL DAILY
Qty: 30 TABLET | Refills: 0 | Status: SHIPPED | OUTPATIENT
Start: 2020-09-08 | End: 2020-09-30

## 2020-09-08 RX ORDER — METOPROLOL SUCCINATE 50 MG/1
50 TABLET, EXTENDED RELEASE ORAL DAILY
Qty: 14 TABLET | Refills: 0 | Status: SHIPPED | OUTPATIENT
Start: 2020-09-08 | End: 2020-09-30

## 2020-09-08 NOTE — TELEPHONE ENCOUNTER
losartan (COZAAR) 100 MG tablet.    Last Written Prescription Date:  7/1/20  Last Fill Quantity: 30,   # refills: 0  Last Office Visit : 6/6/19  Future Office visit:  none    Routing refill request to provider for review/approval because:  Overdue appointment & labs - creatinine and K+   Nurse - this is a 2nd refill request for medication with out or overdue labs. With last request pt was given 30 day David notified.    Scheduling has been notified to contact the pt for overdue appointment/labs.     30 day pended.

## 2020-09-08 NOTE — TELEPHONE ENCOUNTER
Last Clinic Visit: 6/6/19, no upcoming appointments scheduled, overdue for PHQ-9, received 30 day refills with last prescription, per protocol 14 day refills provided, routed to clinic for follow up

## 2020-09-27 DIAGNOSIS — F32.89 OTHER DEPRESSION: ICD-10-CM

## 2020-09-27 DIAGNOSIS — I10 BENIGN ESSENTIAL HYPERTENSION: Primary | ICD-10-CM

## 2020-09-27 DIAGNOSIS — K21.9 GASTROESOPHAGEAL REFLUX DISEASE WITHOUT ESOPHAGITIS: ICD-10-CM

## 2020-09-30 RX ORDER — METOPROLOL SUCCINATE 50 MG/1
50 TABLET, EXTENDED RELEASE ORAL DAILY
Qty: 30 TABLET | Refills: 0 | Status: SHIPPED | OUTPATIENT
Start: 2020-09-30 | End: 2020-10-22

## 2020-09-30 RX ORDER — LOSARTAN POTASSIUM 100 MG/1
100 TABLET ORAL DAILY
Qty: 30 TABLET | Refills: 0 | Status: SHIPPED | OUTPATIENT
Start: 2020-09-30 | End: 2020-10-21

## 2020-09-30 RX ORDER — CITALOPRAM HYDROBROMIDE 20 MG/1
20 TABLET ORAL DAILY
Qty: 30 TABLET | Refills: 0 | Status: SHIPPED | OUTPATIENT
Start: 2020-09-30

## 2020-10-13 DIAGNOSIS — F32.89 OTHER DEPRESSION: ICD-10-CM

## 2020-10-14 DIAGNOSIS — I10 BENIGN ESSENTIAL HYPERTENSION: ICD-10-CM

## 2020-10-15 RX ORDER — CITALOPRAM HYDROBROMIDE 20 MG/1
20 TABLET ORAL DAILY
Qty: 30 TABLET | Refills: 0 | Status: SHIPPED | OUTPATIENT
Start: 2020-10-15 | End: 2020-10-26

## 2020-10-16 RX ORDER — METOPROLOL SUCCINATE 50 MG/1
50 TABLET, EXTENDED RELEASE ORAL DAILY
Qty: 30 TABLET | Refills: 0 | OUTPATIENT
Start: 2020-10-16

## 2020-10-16 NOTE — TELEPHONE ENCOUNTER
metoprolol succinate ER (TOPROL-XL) 50 MG 24 hr tablet  Last Written Prescription Date:  9/30/20  Last Fill Quantity: 30,   # refills: 0  Last Office Visit : 6/6/2019  Future Office visit:  10/22/20       Denied, adequate to next appt 10/22/20  RF addressed at upcoming appointment,sufficient supply sent 9/30/20

## 2020-10-19 ENCOUNTER — DOCUMENTATION ONLY (OUTPATIENT)
Dept: CARE COORDINATION | Facility: CLINIC | Age: 80
End: 2020-10-19

## 2020-10-21 ENCOUNTER — TELEPHONE (OUTPATIENT)
Dept: FAMILY MEDICINE | Facility: CLINIC | Age: 80
End: 2020-10-21

## 2020-10-21 DIAGNOSIS — I10 BENIGN ESSENTIAL HYPERTENSION: ICD-10-CM

## 2020-10-21 RX ORDER — LOSARTAN POTASSIUM 100 MG/1
100 TABLET ORAL DAILY
Qty: 90 TABLET | Refills: 0 | Status: SHIPPED | OUTPATIENT
Start: 2020-10-21 | End: 2021-01-06

## 2020-10-21 NOTE — TELEPHONE ENCOUNTER
[facility] Call Center    Phone Message    May a detailed message be left on voicemail: yes     Reason for Call: Other: Per Patient is wanting to get a call back in regards to getting an apt. Patient states cannot make the apt tomorrow, 10/22/2020 due to snow. Patient states needing to get medicaiton refills and to schedule an apt. please advise.      Patient states she is concerned about the losartan (COZAAR) 100 MG tablet and states she only has enough for tonight. Patient is okay with doing a telephone visit. Patient is wanting to get a call back today, 10/21/2020 ASAP.     Action Taken: Message routed to:  [facility] pcc    Travel Screening: Not Applicable

## 2020-10-22 RX ORDER — METOPROLOL SUCCINATE 50 MG/1
50 TABLET, EXTENDED RELEASE ORAL DAILY
Qty: 30 TABLET | Refills: 0 | Status: SHIPPED | OUTPATIENT
Start: 2020-10-22 | End: 2020-11-08

## 2020-10-22 NOTE — TELEPHONE ENCOUNTER
Patient reschedule to Monday 10/26 at 11:00 AM for a virtual med check and follow up.    Patient requesting refills on losartan, citalopram, and metoprolol as she will ran out tomorrow. Will send to RN.

## 2020-10-26 ENCOUNTER — VIRTUAL VISIT (OUTPATIENT)
Dept: FAMILY MEDICINE | Facility: CLINIC | Age: 80
End: 2020-10-26
Payer: COMMERCIAL

## 2020-10-26 DIAGNOSIS — F43.21 GRIEF: Primary | ICD-10-CM

## 2020-10-26 DIAGNOSIS — F32.89 OTHER DEPRESSION: ICD-10-CM

## 2020-10-26 PROCEDURE — 99215 OFFICE O/P EST HI 40 MIN: CPT | Mod: 95 | Performed by: FAMILY MEDICINE

## 2020-10-26 RX ORDER — CITALOPRAM HYDROBROMIDE 20 MG/1
20 TABLET ORAL DAILY
Qty: 90 TABLET | Refills: 3 | Status: SHIPPED | OUTPATIENT
Start: 2020-10-26 | End: 2021-07-20

## 2020-10-26 NOTE — PROGRESS NOTES
"Jessica Loaiza is a 79 year old female who is being evaluated via a billable telephone visit.      The patient has been notified of following:     \"This telephone visit will be conducted via a call between you and your physician/provider. We have found that certain health care needs can be provided without the need for a physical exam.  This service lets us provide the care you need with a short phone conversation.  If a prescription is necessary we can send it directly to your pharmacy.  If lab work is needed we can place an order for that and you can then stop by our lab to have the test done at a later time.    Telephone visits are billed at different rates depending on your insurance coverage. During this emergency period, for some insurers they may be billed the same as an in-person visit.  Please reach out to your insurance provider with any questions.    If during the course of the call the physician/provider feels a telephone visit is not appropriate, you will not be charged for this service.\"    Patient has given verbal consent for Telephone visit?  Yes    What phone number would you like to be contacted at? [phone]    How would you like to obtain your AVS? Mail a copy    Subjective     Jessica Loaiza is a 79 year old female who presents via phone visit today for the following health issues:    HPI  1-due for dtap, flu shot, shingrix, dexa. Consider mammogram (over 75)  2-due for labs cbc/diff, cmet, tsh, liids, hgba1c   Re; high chol, htn, elevated glucose    Just a few mo ago grandson Jan  in MVA she and whole family reeling. Support [place] other well[place]. No video access. Open to phone therapy.    On [employer].    Past Medical History:   Diagnosis Date     Cataracts, bilateral      Dyslipidemia      HTN (hypertension)      Statin intolerance 10/22/2015     No past surgical history on file.  Allergies   Allergen Reactions     Gabapentin Nausea     " Ibuprofen Sodium GI Disturbance     Simvastatin Cramps     Other reaction(s): Other  Groin and leg cramps                   Review of Systems     Objective          Vitals:  No vitals were obtained today due to virtual visit.    healthy, alert and no distress  PSYCH: Alert and oriented times 3; coherent speech, normal   rate and volume, able to articulate logical thoughts, able   to abstract reason, no tangential thoughts, no hallucinations   or delusions  Her affect is normal  RESP: No cough, no audible wheezing, able to talk in full sentences  Remainder of exam unable to be completed due to telephone visits          Assessment/Plan:    1-high chol, htn, labs as above, see me when can, hard to get out, [place] but plans trip to cities soon, dates unclear  I asked her to call asap when she plans a trip, we can offer labs, shots, dexa, maybe mammogram, office call w/ me if worse    Grief  Cont celexa. See therapy (phone). Long talk, 40 minutes, mainly about losing grandson and grief.     Phone call duration:  40 minutes  Babatunde Mueller MD

## 2020-10-26 NOTE — NURSING NOTE
Chief Complaint   Patient presents with     Recheck Medication     Patient calls in for a routine check up and for medication refills.          Rosalio Richardson MA on 10/26/2020 at 10:13 AM

## 2020-10-26 NOTE — PATIENT INSTRUCTIONS
[facility] Medication Refill Request Information:  * Please contact your pharmacy regarding ANY request for medication refills.  ** [facility] Prescription Fax = [phone]  * Please allow 3 business days for routine medication refills.  * Please allow 5 business days for controlled substance medication refills.     [facility] Test Result notification information:  *You will be notified with in 7-10 days of your appointment day regarding the results of your test.  If you are on MyChart you will be notified as soon as the provider has reviewed the results and signed off on them.    [facility]: [phone]

## 2020-11-04 DIAGNOSIS — I10 BENIGN ESSENTIAL HYPERTENSION: ICD-10-CM

## 2020-11-08 RX ORDER — METOPROLOL SUCCINATE 50 MG/1
50 TABLET, EXTENDED RELEASE ORAL DAILY
Qty: 90 TABLET | Refills: 2 | Status: SHIPPED | OUTPATIENT
Start: 2020-11-08 | End: 2021-08-09

## 2020-11-08 NOTE — TELEPHONE ENCOUNTER
metoprolol succinate ER (TOPROL-XL) 50 MG 24 hr tablet    Last Written Prescription Date: 10/22/20  Last Fill Quantity: 30   # refills: 0  Last Office Visit : 10/26/20  Future Office visit:  none

## 2020-11-19 ENCOUNTER — VIRTUAL VISIT (OUTPATIENT)
Dept: BEHAVIORAL HEALTH | Facility: CLINIC | Age: 80
End: 2020-11-19
Attending: FAMILY MEDICINE
Payer: COMMERCIAL

## 2020-11-19 DIAGNOSIS — F43.21 GRIEF AT LOSS OF CHILD: Primary | ICD-10-CM

## 2020-11-19 DIAGNOSIS — Z63.4 GRIEF AT LOSS OF CHILD: Primary | ICD-10-CM

## 2020-11-19 PROCEDURE — 90834 PSYTX W PT 45 MINUTES: CPT | Mod: 95 | Performed by: PSYCHOLOGIST

## 2020-11-19 SDOH — SOCIAL STABILITY - SOCIAL INSECURITY: DISSAPEARANCE AND DEATH OF FAMILY MEMBER: Z63.4

## 2020-11-19 NOTE — PROGRESS NOTES
"[facility]  2020      Behavioral Health Clinician Progress Note    Patient Name: Jessica Loaiza           Service Type: Phone Visit      Service Location:  at the patient's home      Session Start Time: 11:04  Session End Time:  11:55      Session Length: 38 - 52      Attendees: Client    Visit Activities (Refresh list every visit): NEW, [facility] Only and Phone Encounter    Diagnostic Assessment Date: IP  Treatment Plan Review Date: IP  See Flowsheets for today's PHQ-9 and ZELDA-7 results  Previous PHQ-9:   PHQ-9 SCORE 10/26/2016 2017 2019   PHQ-9 Total Score - - -   PHQ-9 Total Score 6 2 4     Previous ZELDA-7:   ZELDA-7 SCORE 2019   Total Score 3       TANK LEVEL:  No flowsheet data found.    DATA  Extended Session (60+ minutes): No  Interactive Complexity: No  Crisis: No    Treatment Objective(s) Addressed in This Session:  Target Behavior(s): disease management/lifestyle changes related to grief    Grief / Loss: will increase understanding of normal grieving process, will engage in effective approach to address and resolve grief/loss issues and will process grief/loss issues in an adaptive manner    Current Stressors / Issues:  Azul is a very pleasant and engaging 79-year-old, , female who presents today for a [facility] appointment. She reports understanding the reason for the referral to counseling was because of the recent loss of her grandson who tragically  in a MVA on . He reportedly collided with a semi-truck while driving and Azul suspects he had another seizure that made him lose consciousness. Azul indicates her grandson had another episode like this a few years ago when he \"passed out\" while driving from a suspected seizure and had an accident. Since July, Azul states she as struggled with a sense of grief and loss. She notes her family is reeling from the loss and she notes that it might be helpful for " "her to talk to someone. I provided Azul supportive counseling and a bit of psycho-education about grief processes after the loss of a child. I attempted to normalize her grief reactions and discussed how counseling can help facilitate adaptive coping and increase support in difficult times.     Azul denied history or any current problems with suicidal ideation. She cites her commitment to family and oseas as her main deterrants against self-harm.     Azul also cited some secondary concerns about interacting with one of her grand daughters, who reportedly has some significant mental health difficulties. Per her report, her grand daughter can be easily \"triggerred\" and has a history of self-harm behavior. Azul indicates she doesn't know how best to interact with her and they can get into conflicts at times. Azul was open to talking about this more in counseling in the future.     I reviewed a few treatment recommendations and impressions. Azul agreed to continue meeting with me every couple weeks for grief-related counseling.     Supportive counseling and psycho-education emphasized today. Reviewed treatment options.     Progress on Treatment Objective(s) / Homework:  New Objective established this session - PREPARATION (Decided to change - considering how); Intervened by negotiating a change plan and determining options / strategies for behavior change, identifying triggers, exploring social supports, and working towards setting a date to begin behavior change    Motivational Interviewing    MI Intervention: Expressed Empathy/Understanding, Supported Autonomy, Collaboration, Evocation, Permission to raise concern or advise, Open-ended questions, Reflections: simple and complex, Change talk (evoked) and Reframe     Change Talk Expressed by the Patient: Need to change    Provider Response to Change Talk: E - Evoked more info from patient about behavior change, A - Affirmed patient's thoughts, decisions, or " attempts at behavior change, R - Reflected patient's change talk and S - Summarized patient's change talk statements    Also provided psychoeducation about behavioral health condition, symptoms, and treatment options    Grief counseling     Care Plan review completed: Yes    Medication Review:  No changes to current psychiatric medication(s)    Medication Compliance:  Yes    Changes in Health Issues:   None reported    Chemical Use Review:   Substance Use: Chemical use reviewed, no active concerns identified      Tobacco Use: No current tobacco use.      Assessment: Current Emotional / Mental Status (status of significant symptoms):  Risk status (Self / Other harm or suicidal ideation)  Patient denies a history of suicidal ideation, suicide attempts, self-injurious behavior, homicidal ideation, homicidal behavior and and other safety concerns  Patient denies current fears or concerns for personal safety.  Patient denies current or recent suicidal ideation or behaviors.  Patient denies current or recent homicidal ideation or behaviors.  Patient denies current or recent self injurious behavior or ideation.  Patient denies other safety concerns.  A safety and risk management plan has not been developed at this time, however patient was encouraged to call [place] [phone] should there be a change in any of these risk factors.    [place] Suicide Severity Rating Scale (Short Version)  [place] Suicide Severity Rating (Short Version) 11/20/2020   Over the past 2 weeks have you felt down, depressed, or hopeless? no   Over the past 2 weeks have you had thoughts of killing yourself? no   Have you ever attempted to kill yourself? no       Appearance:   Unable to assess   Eye Contact:   Unable to assess   Psychomotor Behavior: Unable to assess   Attitude:   Cooperative  Friendly Pleasant  Orientation:   All  Speech   Rate / Production: Normal    Volume:  Normal   Mood:    Sad  Grieving  Affect:    Appropriate   Thought  Content:  Clear   Thought Form:  Coherent  Logical   Insight:    Good     Diagnoses:  1. Grief at loss of child        Collateral Reports Completed:  Not Applicable    Plan: (Homework, other):  Patient was given information about behavioral services and encouraged to schedule a follow up appointment with the clinic [facility] in 3 weeks.  She was also given information about mental health symptoms and treatment options .  CD Recommendations: No indications of CD issues. Alfonso Farnsworth, LP

## 2020-12-10 ENCOUNTER — VIRTUAL VISIT (OUTPATIENT)
Dept: BEHAVIORAL HEALTH | Facility: CLINIC | Age: 80
End: 2020-12-10
Payer: COMMERCIAL

## 2020-12-10 DIAGNOSIS — Z63.4 GRIEF AT LOSS OF CHILD: Primary | ICD-10-CM

## 2020-12-10 DIAGNOSIS — F43.21 GRIEF AT LOSS OF CHILD: Primary | ICD-10-CM

## 2020-12-10 PROCEDURE — 90837 PSYTX W PT 60 MINUTES: CPT | Mod: 95 | Performed by: PSYCHOLOGIST

## 2020-12-10 SDOH — SOCIAL STABILITY - SOCIAL INSECURITY: DISSAPEARANCE AND DEATH OF FAMILY MEMBER: Z63.4

## 2020-12-10 NOTE — PROGRESS NOTES
"[facility]  December 10, 2020      Behavioral Health Clinician Progress Note    Patient Name: Jessica Loaiza           Service Type: Phone Visit      Service Location:  at the patient's home      Session Start Time: 11:00  Session End Time:  11:58      Session Length: 53 - 60       Attendees: Client    Visit Activities (Refresh list every visit): [facility] Only and Phone Encounter    Jessica Loaiza is a 80 year old female who is being evaluated via a telephone visit.      The patient has been notified of the following:     \"We have found that certain health care needs can be provided without the need for a face to face visit.  This service lets us provide the care you need with a short phone conversation.      I will have full access to your [facility] medical record during this entire phone call.   I will be taking notes for your medical record.     Since this is like an office visit, we will bill your insurance company for this service.  Please check with your medical insurance if this type of telephone visit/virtual care is covered.  You may be responsible for the cost of this service if insurance coverage is denied.      There are potential benefits and risks of telephone visits (e.g. limits to patient confidentiality) that differ from in-person visits.?  Confidentiality still applies for telephone services, and nobody will record the visit.  It is important to be in a quiet, private space that is free of distractions (including cell phone or other devices) during the visit.??     If during the course of the call I believe a telephone visit is not appropriate, you will not be charged for this service\"    Consent has been obtained for this service by care team member: yes.    Diagnostic Assessment Date: IP  Treatment Plan Review Date: IP  See Flowsheets for today's PHQ-9 and ZELDA-7 results  Previous PHQ-9:   PHQ-9 SCORE 10/26/2016 11/28/2017 6/6/2019 " "  PHQ-9 Total Score - - -   PHQ-9 Total Score 6 2 4     Previous ZELDA-7:   ZELDA-7 SCORE 2019   Total Score 3       TANK LEVEL:  No flowsheet data found.    DATA  Extended Session (60+ minutes): No  Interactive Complexity: No  Crisis: No    Treatment Objective(s) Addressed in This Session:  Target Behavior(s): disease management/lifestyle changes related to grief    Grief / Loss: will increase understanding of normal grieving process, will engage in effective approach to address and resolve grief/loss issues and will process grief/loss issues in an adaptive manner    Current Stressors / Issues:  Completed a follow-up [facility] appointment with Azul today. She reported her mood has felt better since we last met, noting she is not observing herself crying as periodically as before about the loss of her grandson. She shared that our last session was quite helpful for her.  Azul continued to share about her family and experiences with the loss of her grandson who  in an MVA last summer. We continued to process Azul's difficult emotions related to grief (e.g. anger, sadness, fear). I continued to provide her information about the grieving process, specifically how everyone grieves differently, as she reported her daughter seems to be doing better than her. Most of my interventions centered on normalizing Azul's reported experiences with grief and providing her supportive counseling.     We also spent some time processing her difficulties with loneliness and isolation. Typically around this time of year, Azul mentioned her family visits often and this has stopped due to COVID-19. We talked about how her experience of \"not knowing who I am anymore\" could reflect these big changes to her social interactions with family and friends. I normalized again how hard the pandemic is, and how it started during a very difficult time of life for her.     Progress on Treatment Objective(s) / Homework:  Satisfactory progress - ACTION " (Actively working towards change); Intervened by reinforcing change plan / affirming steps taken    Also provided psychoeducation about behavioral health condition, symptoms, and treatment options    Grief counseling for loss of a grandchild     Care Plan review completed: No    Medication Review:  No changes to current psychiatric medication(s)    Medication Compliance:  Yes    Changes in Health Issues:   None reported    Chemical Use Review:   Substance Use: Chemical use reviewed, no active concerns identified      Tobacco Use: No current tobacco use.      Assessment: Current Emotional / Mental Status (status of significant symptoms):  Risk status (Self / Other harm or suicidal ideation)  Patient denies a history of suicidal ideation, suicide attempts, self-injurious behavior, homicidal ideation, homicidal behavior and and other safety concerns     Patient denies current fears or concerns for personal safety.  Patient denies current or recent suicidal ideation or behaviors.  Patient denies current or recent homicidal ideation or behaviors.  Patient denies current or recent self injurious behavior or ideation.  Patient denies other safety concerns.     A safety and risk management plan has not been developed at this time, however patient was encouraged to call [place] [phone] should there be a change in any of these risk factors.    [place] Suicide Severity Rating Scale (Short Version)  [place] Suicide Severity Rating (Short Version) 11/20/2020   Over the past 2 weeks have you felt down, depressed, or hopeless? no   Over the past 2 weeks have you had thoughts of killing yourself? no   Have you ever attempted to kill yourself? no       Appearance:   Unable to assess   Eye Contact:   Unable to assess   Psychomotor Behavior: Unable to assess   Attitude:   Cooperative  Friendly Pleasant  Orientation:   All  Speech   Rate / Production: Normal    Volume:  Normal   Mood:    Sad  Grieving  Affect:    Appropriate    Thought Content:  Clear   Thought Form:  Coherent  Logical   Insight:    Good      Diagnoses:  1. Grief at loss of child        Collateral Reports Completed:  Not Applicable    Plan: (Homework, other):  Patient was given information about behavioral services and encouraged to schedule a follow up appointment with the clinic [facility] in 3 weeks.  She was also given information about mental health symptoms and treatment options .  CD Recommendations: No indications of CD issues. Alfonso Farnsworth, LP

## 2020-12-28 DIAGNOSIS — K21.9 GASTROESOPHAGEAL REFLUX DISEASE WITHOUT ESOPHAGITIS: ICD-10-CM

## 2020-12-28 NOTE — TELEPHONE ENCOUNTER
omeprazole (PRILOSEC) 20 MG DR capsule     Last Written Prescription Date:  9/30/2020  Last Fill Quantity: 30,   # refills: 0  Last Office Visit : 10/26/2020  Future Office visit:  None  Routing refill request to provider for review/approval because:  Only a 30 day supply sent to pharm last order.   Is Pt taking this on a regular basis?  Is it okay to fill for 90 days with 3 refills for Pt care?        Refer to clinic for review    Brenda Catalan RN  Central Triage Red Flags/Med Refills

## 2021-01-04 ENCOUNTER — VIRTUAL VISIT (OUTPATIENT)
Dept: BEHAVIORAL HEALTH | Facility: CLINIC | Age: 81
End: 2021-01-04
Payer: COMMERCIAL

## 2021-01-04 DIAGNOSIS — F43.21 GRIEF AT LOSS OF CHILD: Primary | ICD-10-CM

## 2021-01-04 DIAGNOSIS — Z63.4 GRIEF AT LOSS OF CHILD: Primary | ICD-10-CM

## 2021-01-04 PROCEDURE — 90837 PSYTX W PT 60 MINUTES: CPT | Mod: 95 | Performed by: PSYCHOLOGIST

## 2021-01-04 SDOH — SOCIAL STABILITY - SOCIAL INSECURITY: DISSAPEARANCE AND DEATH OF FAMILY MEMBER: Z63.4
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
## 2021-01-26 DIAGNOSIS — I10 BENIGN ESSENTIAL HYPERTENSION: ICD-10-CM

## 2021-01-28 ENCOUNTER — VIRTUAL VISIT (OUTPATIENT)
Dept: BEHAVIORAL HEALTH | Facility: CLINIC | Age: 81
End: 2021-01-28
Payer: COMMERCIAL

## 2021-01-28 DIAGNOSIS — Z53.9 ERRONEOUS ENCOUNTER--DISREGARD: Primary | ICD-10-CM

## 2021-01-28 NOTE — PROGRESS NOTES
Appointment cancelled. Azul stated she was over her minutes on her phone and wanted to avoid a charge. She requested we reschedule our appointment today. We rescheduled for 2/15/2021 at 9:00

## 2021-01-29 RX ORDER — LOSARTAN POTASSIUM 100 MG/1
100 TABLET ORAL DAILY
Qty: 90 TABLET | Refills: 0 | OUTPATIENT
Start: 2021-01-29

## 2021-02-15 ENCOUNTER — VIRTUAL VISIT (OUTPATIENT)
Dept: BEHAVIORAL HEALTH | Facility: CLINIC | Age: 81
End: 2021-02-15
Payer: COMMERCIAL

## 2021-02-15 DIAGNOSIS — Z63.4 GRIEF AT LOSS OF CHILD: ICD-10-CM

## 2021-02-15 DIAGNOSIS — F43.23 ADJUSTMENT DISORDER WITH MIXED ANXIETY AND DEPRESSED MOOD: Primary | ICD-10-CM

## 2021-02-15 DIAGNOSIS — F43.21 GRIEF AT LOSS OF CHILD: ICD-10-CM

## 2021-02-15 PROCEDURE — 90791 PSYCH DIAGNOSTIC EVALUATION: CPT | Mod: 95 | Performed by: PSYCHOLOGIST

## 2021-02-15 SDOH — SOCIAL STABILITY - SOCIAL INSECURITY: DISSAPEARANCE AND DEATH OF FAMILY MEMBER: Z63.4

## 2021-02-15 ASSESSMENT — COLUMBIA-SUICIDE SEVERITY RATING SCALE - C-SSRS
6. HAVE YOU EVER DONE ANYTHING, STARTED TO DO ANYTHING, OR PREPARED TO DO ANYTHING TO END YOUR LIFE?: NO
2. HAVE YOU ACTUALLY HAD ANY THOUGHTS OF KILLING YOURSELF LIFETIME?: YES
TOTAL  NUMBER OF INTERRUPTED ATTEMPTS PAST 3 MONTHS: NO
5. HAVE YOU STARTED TO WORK OUT OR WORKED OUT THE DETAILS OF HOW TO KILL YOURSELF? DO YOU INTEND TO CARRY OUT THIS PLAN?: NO
2. HAVE YOU ACTUALLY HAD ANY THOUGHTS OF KILLING YOURSELF?: NO
4. HAVE YOU HAD THESE THOUGHTS AND HAD SOME INTENTION OF ACTING ON THEM?: NO
TOTAL  NUMBER OF INTERRUPTED ATTEMPTS LIFETIME: NO
1. IN THE PAST MONTH, HAVE YOU WISHED YOU WERE DEAD OR WISHED YOU COULD GO TO SLEEP AND NOT WAKE UP?: YES
ATTEMPT LIFETIME: NO
6. HAVE YOU EVER DONE ANYTHING, STARTED TO DO ANYTHING, OR PREPARED TO DO ANYTHING TO END YOUR LIFE?: NO
5. HAVE YOU STARTED TO WORK OUT OR WORKED OUT THE DETAILS OF HOW TO KILL YOURSELF? DO YOU INTEND TO CARRY OUT THIS PLAN?: NO
1. IN THE PAST MONTH, HAVE YOU WISHED YOU WERE DEAD OR WISHED YOU COULD GO TO SLEEP AND NOT WAKE UP?: NO
ATTEMPT PAST THREE MONTHS: NO
3. HAVE YOU BEEN THINKING ABOUT HOW YOU MIGHT KILL YOURSELF?: YES
TOTAL  NUMBER OF ABORTED OR SELF INTERRUPTED ATTEMPTS PAST 3 MONTHS: NO
TOTAL  NUMBER OF ABORTED OR SELF INTERRUPTED ATTEMPTS PAST LIFETIME: NO
4. HAVE YOU HAD THESE THOUGHTS AND HAD SOME INTENTION OF ACTING ON THEM?: NO
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
## 2021-03-04 ENCOUNTER — VIRTUAL VISIT (OUTPATIENT)
Dept: BEHAVIORAL HEALTH | Facility: CLINIC | Age: 81
End: 2021-03-04
Payer: COMMERCIAL

## 2021-03-04 DIAGNOSIS — F43.21 GRIEF AT LOSS OF CHILD: ICD-10-CM

## 2021-03-04 DIAGNOSIS — Z63.4 GRIEF AT LOSS OF CHILD: ICD-10-CM

## 2021-03-04 DIAGNOSIS — F43.23 ADJUSTMENT DISORDER WITH MIXED ANXIETY AND DEPRESSED MOOD: Primary | ICD-10-CM

## 2021-03-04 PROCEDURE — 90834 PSYTX W PT 45 MINUTES: CPT | Mod: 95 | Performed by: PSYCHOLOGIST

## 2021-03-04 SDOH — SOCIAL STABILITY - SOCIAL INSECURITY: DISSAPEARANCE AND DEATH OF FAMILY MEMBER: Z63.4

## 2021-03-04 NOTE — PROGRESS NOTES
"[facility]: Integrated Behavioral Health  March 4, 2021      Behavioral Health Clinician Progress Note    Patient Name: Jessica Loaiza           Service Type: Phone Visit      Service Location:  Phone Visit      Session Start Time: 11:04  Session End Time:  11:54      Session Length: 38 - 52       Attendees: Client    Visit Activities (Refresh list every visit): [facility] Only and Phone Encounter    Jessica Loaiza is a 80 year old female who is being evaluated via a telephone visit.      The patient has been notified of the following:     \"We have found that certain health care needs can be provided without the need for a face to face visit.  This service lets us provide the care you need with a short phone conversation.      I will have full access to your [place] medical record during this entire phone call.   I will be taking notes for your medical record.     Since this is like an office visit, we will bill your insurance company for this service.  Please check with your medical insurance if this type of telephone visit/virtual care is covered.  You may be responsible for the cost of this service if insurance coverage is denied.      There are potential benefits and risks of telephone visits (e.g. limits to patient confidentiality) that differ from in-person visits.?  Confidentiality still applies for telephone services, and nobody will record the visit.  It is important to be in a quiet, private space that is free of distractions (including cell phone or other devices) during the visit.??     If during the course of the call I believe a telephone visit is not appropriate, you will not be charged for this service\"    Consent has been obtained for this service by care team member: yes.    Diagnostic Assessment Date: 2/15/2021  Treatment Plan Review Date: 6/4/2021  See Flowsheets for today's PHQ-9 and ZELDA-7 results  Previous PHQ-9:   PHQ-9 SCORE 10/26/2016 11/28/2017 6/6/2019 " "  PHQ-9 Total Score - - -   PHQ-9 Total Score 6 2 4     Previous ZELDA-7:   ZELDA-7 SCORE 2019   Total Score 3       TANK LEVEL:  No flowsheet data found.    DATA  Extended Session (60+ minutes): No  Interactive Complexity: No  Crisis: No    Treatment Objective(s) Addressed in This Session:  Target Behavior(s): disease management/lifestyle changes related to grief and trauma    Relationship Problems: will address relationship difficulties in a more adaptive manner  Grief / Loss: will increase understanding of normal grieving process, will engage in effective approach to address and resolve grief/loss issues and will process grief/loss issues in an adaptive manner    Current Stressors / Issues:  Azul shared she has struggled more with grief over the last three days. She is thinking more about her grandson Jan who  in an MVA several months ago. She wonders why some days are better than others with thinking about him, noting she has found herself struggling more lately. Grief from what she described affects her sleep quality and her desire to get out of bed in the morning. Normalized her experience with grief and talked about how it can come in \"waves.\" Talked about viewing grief as consisting of good and bad days, that sometimes it will affect her understandably.     Azul also shared she has been bothered by some recent interactions with her children. She remains perplexed by their reactions to her. For instance, she indicated having a call with her daughter who \"blew up\" at her on the phone. Azul shared she is not sure why this happened. She indicated that she might have shared with her grand daughter that she can be negative at times and this made her react strongly. Explored possible explanations for their reactions, connecting how and what Azul says might affect them. She did share having a tendency to \"speak her mind\" and have \"no filter\" at times. Talked about how this might affect her children under an " insight-oriented approach. Explored alternative patterns of interactions with them to avoid conflict.     Progress on Treatment Objective(s) / Homework:  Satisfactory progress - ACTION (Actively working towards change); Intervened by reinforcing change plan / affirming steps taken    Also provided psychoeducation about behavioral health condition, symptoms, and treatment options    Insight and behavior therapy for relationship concerns    Grief counseling for loss of a grandchild / brother    Care Plan review completed: Yes    Medication Review:  No changes to current psychiatric medication(s)    Medication Compliance:  Yes    Changes in Health Issues:   None reported    Chemical Use Review:   Substance Use: Chemical use reviewed, no active concerns identified      Tobacco Use: No current tobacco use.      Assessment: Current Emotional / Mental Status (status of significant symptoms):  Risk status (Self / Other harm or suicidal ideation)  Patient denies a history of suicidal ideation, suicide attempts, self-injurious behavior, homicidal ideation, homicidal behavior and and other safety concerns     Patient denies current fears or concerns for personal safety.  Patient denies current or recent suicidal ideation or behaviors.  Patient denies current or recent homicidal ideation or behaviors.  Patient denies current or recent self injurious behavior or ideation.  Patient denies other safety concerns.     A safety and risk management plan has not been developed at this time, however patient was encouraged to call [place] [phone] should there be a change in any of these risk factors.    [place] Suicide Severity Rating Scale (Short Version)  [place] Suicide Severity Rating (Short Version) 11/20/2020   Over the past 2 weeks have you felt down, depressed, or hopeless? no   Over the past 2 weeks have you had thoughts of killing yourself? no   Have you ever attempted to kill yourself? no       Appearance:   Unable to  assess   Eye Contact:   Unable to assess   Psychomotor Behavior: Unable to assess   Attitude:   Cooperative  Friendly Pleasant  Orientation:   All  Speech   Rate / Production: Normal    Volume:  Normal   Mood:    Sad  Grieving  Affect:    Appropriate   Thought Content:  Clear   Thought Form:  Coherent  Logical   Insight:    Good      Diagnoses:  1. Adjustment disorder with mixed anxiety and depressed mood    2. Grief at loss of child          Collateral Reports Completed:  Not Applicable    Plan: (Homework, other):  Azul was given information about behavioral services and encouraged to schedule a follow up appointment with the clinic [facility] in 3 weeks.  She was also given information about mental health symptoms and treatment options .  She has ideas to manage relationship issues more adaptively. CD Recommendations: No indications of CD issues.     Alfonso Farnsworth, LP  3/4/2021    ______________________________________________________________________    [facility] Integrated Behavioral Health Treatment Plan    Client's Name: Jessica Loaiza  YOB: 1940    Date: 3/4/2021    DSM-V Diagnoses: Adjustment Disorders  309.28 (F43.23) With mixed anxiety and depressed mood   Psychosocial / Contextual Factors: grief experience; loss of grandson to MVA    Clinical Global Impressions  First:  Considering your total clinical experience with this particular patient population, how severe are the patient's symptoms at this time?: 3 (2/15/2021  9:42 AM)      Most recent:  Compared to the patient's condition at the START of treatment, this patient's condition is: 3 (2/15/2021  9:42 AM)      Referral / Collaboration:  Will collaborate with care team as indicated during treatment.    Anticipated number of session or this episode of care: 8+      MeasurableTreatment Goal(s) related to diagnosis / functional impairment(s)  Goal 1:  Patient will experience a reduction in depressive and anxious symptoms, along with a corresponding  increase in positive emotion and life satisfaction.    Objective #A: Patient will experience a reduction in depressed mood, will develop more effective coping skills to manage depressive symptoms, will develop healthy cognitive patterns and beliefs, will increase ability to function adaptively and will continue to take medications as prescribed / participate in supportive activities and services    Status: Continued - Date(s): 3/4/2021    Objective #B: Patient will experience a reduction in anxiety, will develop more effective coping skills to manage anxiety symptoms, will develop healthy cognitive patterns and beliefs and will increase ability to function adaptively  Status: Continued - Date(s): 3/4/2021    Objective #C: Patient will develop better understanding of triggers and coping strategies to stabilize mood  Status: Continued - Date(s): 3/4/2021    Goal 2:  Patient will identify and increase engagement in valued activity, i.e. improving social connections/relationships, pursuing occupational goals or personally meaningful pursuits, exploration of meaning in life.     Objective #A: Patient will identify meaningful activity in social, occupational and  personal goals, and increase behavioral activation around these goals   Status: Continued - Date(s): 3/4/2021    Objective #B: Patient will address relationship difficulties in a more adaptive manner  Status: Continued - Date(s): 3/4/2021    Objective #C: Patient will develop coping/problem-solving skills to facilitate more adaptive adjustment and will effectively address problems that interfere with adaptive functioning  Status: Continued - Date(s): 3/4/2021    Goal 3:  Patient will address grief difficulties in a more adaptive manner    Objective #A: Patient will increase understanding of normal grieving process   Status: Continued - Date(s): 3/4/2021    Objective #B: Patient will engage in effective approach to address and resolve grief/loss issues  Status:  Continued - Date(s): 3/4/2021    Objective #C: Patient will process grief/loss issues in an adaptive manner  Status: Continued - Date(s): 3/4/2021    Possible Therapeutic Intervention(s)  Psycho-education regarding mental health diagnoses and treatment options    Skills training    Explore skills useful to client in current situation.    Skills include assertiveness, communication, conflict management, problem-solving, relaxation, etc.    Solution-Focused Therapy    Explore patterns in patient's relationships and discuss options for new behaviors.    Explore patterns in patient's actions and choices and discuss options for new behaviors.    Cognitive-behavioral Therapy    Discuss common cognitive distortions, identify them in patient's life.    Explore ways to challenge, replace, and act against these cognitions.    Acceptance and Commitment Therapy    Explore and identify important values in patient's life.    Discuss ways to commit to behavioral activation around these values.    Psychodynamic psychotherapy    Discuss patient's emotional dynamics and issues and how they impact behaviors.    Explore patient's history of relationships and how they impact present behaviors.    Explore how to work with and make changes in these schemas and patterns.    Narrative Therapy    Explore the patient's story of his/her life from his/her perspective.    Explore alternate ways of understanding their experience, identifying exceptions, developing new themes.    Interpersonal Psychotherapy    Explore patterns in relationships that are effective or ineffective at helping patient reach their goals, find satisfying experience.    Discuss new patterns or behaviors to engage in for improved social functioning.    Behavioral Activation    Discuss steps patient can take to become more involved in meaningful activity.    Identify barriers to these activities and explore possible solutions.    Mindfulness-Based Strategies    Discuss  skills based on development and application of mindfulness.    Skills drawn from compassion-focused therapy, dialectical behavior therapy, mindfulness-based stress reduction, mindfulness-based cognitive therapy, etc.      We have developed these goals together during our work to this point. Patient has assisted in the development of these goals and has agreed to this treatment plan.       Alfonso Farnsworth LP  March 4, 2021

## 2021-03-23 ENCOUNTER — VIRTUAL VISIT (OUTPATIENT)
Dept: BEHAVIORAL HEALTH | Facility: CLINIC | Age: 81
End: 2021-03-23
Payer: COMMERCIAL

## 2021-03-23 DIAGNOSIS — F43.23 ADJUSTMENT DISORDER WITH MIXED ANXIETY AND DEPRESSED MOOD: Primary | ICD-10-CM

## 2021-03-23 PROCEDURE — 90837 PSYTX W PT 60 MINUTES: CPT | Mod: 95 | Performed by: PSYCHOLOGIST
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
## 2021-04-13 ENCOUNTER — VIRTUAL VISIT (OUTPATIENT)
Dept: BEHAVIORAL HEALTH | Facility: CLINIC | Age: 81
End: 2021-04-13
Payer: COMMERCIAL

## 2021-04-13 DIAGNOSIS — F43.21 GRIEF AT LOSS OF CHILD: ICD-10-CM

## 2021-04-13 DIAGNOSIS — F43.23 ADJUSTMENT DISORDER WITH MIXED ANXIETY AND DEPRESSED MOOD: Primary | ICD-10-CM

## 2021-04-13 DIAGNOSIS — Z63.4 GRIEF AT LOSS OF CHILD: ICD-10-CM

## 2021-04-13 PROCEDURE — 90834 PSYTX W PT 45 MINUTES: CPT | Mod: 95 | Performed by: PSYCHOLOGIST

## 2021-04-13 SDOH — SOCIAL STABILITY - SOCIAL INSECURITY: DISSAPEARANCE AND DEATH OF FAMILY MEMBER: Z63.4

## 2021-04-13 NOTE — PROGRESS NOTES
"[facility]: Integrated Behavioral Health  April 13, 2021      Behavioral Health Clinician Progress Note    Patient Name: Jessica Loaiza           Service Type: Phone Visit      Service Location:  Phone Visit      Session Start Time: 1:06  Session End Time:  1:48      Session Length: 38 - 52       Attendees: Client    Visit Activities (Refresh list every visit): [facility] Only and Phone Encounter    Jessica Loaiza is a 80 year old female who is being evaluated via a telephone visit.      The patient has been notified of the following:     \"We have found that certain health care needs can be provided without the need for a face to face visit.  This service lets us provide the care you need with a short phone conversation.      I will have full access to your [facility] medical record during this entire phone call.   I will be taking notes for your medical record.     Since this is like an office visit, we will bill your insurance company for this service.  Please check with your medical insurance if this type of telephone visit/virtual care is covered.  You may be responsible for the cost of this service if insurance coverage is denied.      There are potential benefits and risks of telephone visits (e.g. limits to patient confidentiality) that differ from in-person visits.?  Confidentiality still applies for telephone services, and nobody will record the visit.  It is important to be in a quiet, private space that is free of distractions (including cell phone or other devices) during the visit.??     If during the course of the call I believe a telephone visit is not appropriate, you will not be charged for this service\"    Consent has been obtained for this service by care team member: yes.    Diagnostic Assessment Date: 2/15/2021  Treatment Plan Review Date: 6/4/2021  See Flowsheets for today's PHQ-9 and ZELDA-7 results  Previous PHQ-9:   PHQ-9 SCORE 10/26/2016 11/28/2017 6/6/2019 " "  PHQ-9 Total Score - - -   PHQ-9 Total Score 6 2 4     Previous ZELDA-7:   ZELDA-7 SCORE 6/6/2019   Total Score 3       TANK LEVEL:  No flowsheet data found.    DATA  Extended Session (60+ minutes): No  Interactive Complexity: No  Crisis: No    Treatment Objective(s) Addressed in This Session:  Target Behavior(s): disease management/lifestyle changes related to grief and relationship    Relationship Problems: will address relationship difficulties in a more adaptive manner  Grief / Loss: will increase understanding of normal grieving process, will engage in effective approach to address and resolve grief/loss issues and will process grief/loss issues in an adaptive manner    Current Stressors / Issues:  Azul presented today for a [facility] follow-up. Continued to help Azlu better address relationship conflicts and grief concerns related to the loss of her grandson. Session mostly focused on the conflict she experiences with one of her daughters. Azul said that she is becoming more aware of her \"tone\" in conversations with her and notes this has been helpful in their communication lately.Explored a bit of what has made her more successful in her encounters with her daughter over the last few weeks to identify what she could continue doing/using. Additionally explored a bit of Azul's past with her , how this might have affected the relationship she has with her children. Looked at how her  being gone frequently resulted in Azul having more pressure as a parent, which made her interactions with her children more difficult at times. Provided Azul validation and support for this. [facility] also continued to support Azul in her pursuit of improving her relationship and continued to provide feedback on strategies she could implement to address relationship concerns in a more adaptive manner.     Supportive and solution counseling emphasized today.     Progress on Treatment Objective(s) / Homework:  Satisfactory " progress - ACTION (Actively working towards change); Intervened by reinforcing change plan / affirming steps taken    Also provided psychoeducation about behavioral health condition, symptoms, and treatment options    Insight and solution-focused therapy for relationship concerns      Care Plan review completed: Yes    Medication Review:  No changes to current psychiatric medication(s)    Medication Compliance:  Yes    Changes in Health Issues:   None reported    Chemical Use Review:   Substance Use: Chemical use reviewed, no active concerns identified      Tobacco Use: No current tobacco use.      Assessment: Current Emotional / Mental Status (status of significant symptoms):  Risk status (Self / Other harm or suicidal ideation)  Patient denies a history of suicidal ideation, suicide attempts, self-injurious behavior, homicidal ideation, homicidal behavior and and other safety concerns     Patient denies current fears or concerns for personal safety.  Patient denies current or recent suicidal ideation or behaviors.  Patient denies current or recent homicidal ideation or behaviors.  Patient denies current or recent self injurious behavior or ideation.  Patient denies other safety concerns.     A safety and risk management plan has not been developed at this time, however patient was encouraged to call [place] [phone] should there be a change in any of these risk factors.    Ada Suicide Severity Rating Scale (Short Version)  Ada Suicide Severity Rating (Short Version) 11/20/2020   Over the past 2 weeks have you felt down, depressed, or hopeless? no   Over the past 2 weeks have you had thoughts of killing yourself? no   Have you ever attempted to kill yourself? no       Appearance:   Unable to assess   Eye Contact:   Unable to assess   Psychomotor Behavior: Unable to assess   Attitude:   Cooperative  Friendly Pleasant  Orientation:   All  Speech   Rate / Production: Normal    Volume:  Normal    Mood:    Sad  Grieving  Affect:    Appropriate   Thought Content:  Clear   Thought Form:  Coherent  Logical   Insight:    Fair      Diagnoses:  1. Adjustment disorder with mixed anxiety and depressed mood    2. Grief at loss of child          Collateral Reports Completed:  Not Applicable    Plan: (Homework, other):  Azul was given information about behavioral services and encouraged to schedule a follow up appointment with the clinic [facility] in 3 weeks.  She was also given information about mental health symptoms and treatment options  and solution-focused strategies to use when facing relationship issues. CD Recommendations: No indications of CD issues.    Alfonso Farnsworth Psy.D, LP   Behavioral Health Clinician   [facility]       ______________________________________________________________________    [facility] Integrated Behavioral Health Treatment Plan    Client's Name: Jessica Loaiza  YOB: 1940    Date: 3/4/2021    DSM-V Diagnoses: Adjustment Disorders  309.28 (F43.23) With mixed anxiety and depressed mood   Psychosocial / Contextual Factors: grief experience; loss of grandson to MVA    Clinical Global Impressions  First:  Considering your total clinical experience with this particular patient population, how severe are the patient's symptoms at this time?: 3 (2/15/2021  9:42 AM)      Most recent:  Compared to the patient's condition at the START of treatment, this patient's condition is: 3 (2/15/2021  9:42 AM)      Referral / Collaboration:  Will collaborate with care team as indicated during treatment.    Anticipated number of session or this episode of care: 8+      MeasurableTreatment Goal(s) related to diagnosis / functional impairment(s)  Goal 1:  Patient will experience a reduction in depressive and anxious symptoms, along with a corresponding increase in positive emotion and life satisfaction.    Objective #A: Patient will experience a reduction in depressed  mood, will develop more effective coping skills to manage depressive symptoms, will develop healthy cognitive patterns and beliefs, will increase ability to function adaptively and will continue to take medications as prescribed / participate in supportive activities and services    Status: Continued - Date(s): 3/4/2021    Objective #B: Patient will experience a reduction in anxiety, will develop more effective coping skills to manage anxiety symptoms, will develop healthy cognitive patterns and beliefs and will increase ability to function adaptively  Status: Continued - Date(s): 3/4/2021    Objective #C: Patient will develop better understanding of triggers and coping strategies to stabilize mood  Status: Continued - Date(s): 3/4/2021    Goal 2:  Patient will identify and increase engagement in valued activity, i.e. improving social connections/relationships, pursuing occupational goals or personally meaningful pursuits, exploration of meaning in life.     Objective #A: Patient will identify meaningful activity in social, occupational and  personal goals, and increase behavioral activation around these goals   Status: Continued - Date(s): 3/4/2021    Objective #B: Patient will address relationship difficulties in a more adaptive manner  Status: Continued - Date(s): 3/4/2021    Objective #C: Patient will develop coping/problem-solving skills to facilitate more adaptive adjustment and will effectively address problems that interfere with adaptive functioning  Status: Continued - Date(s): 3/4/2021    Goal 3:  Patient will address grief difficulties in a more adaptive manner    Objective #A: Patient will increase understanding of normal grieving process   Status: Continued - Date(s): 3/4/2021    Objective #B: Patient will engage in effective approach to address and resolve grief/loss issues  Status: Continued - Date(s): 3/4/2021    Objective #C: Patient will process grief/loss issues in an adaptive manner  Status:  Continued - Date(s): 3/4/2021    Possible Therapeutic Intervention(s)  Psycho-education regarding mental health diagnoses and treatment options    Skills training    Explore skills useful to client in current situation.    Skills include assertiveness, communication, conflict management, problem-solving, relaxation, etc.    Solution-Focused Therapy    Explore patterns in patient's relationships and discuss options for new behaviors.    Explore patterns in patient's actions and choices and discuss options for new behaviors.    Cognitive-behavioral Therapy    Discuss common cognitive distortions, identify them in patient's life.    Explore ways to challenge, replace, and act against these cognitions.    Acceptance and Commitment Therapy    Explore and identify important values in patient's life.    Discuss ways to commit to behavioral activation around these values.    Psychodynamic psychotherapy    Discuss patient's emotional dynamics and issues and how they impact behaviors.    Explore patient's history of relationships and how they impact present behaviors.    Explore how to work with and make changes in these schemas and patterns.    Narrative Therapy    Explore the patient's story of his/her life from his/her perspective.    Explore alternate ways of understanding their experience, identifying exceptions, developing new themes.    Interpersonal Psychotherapy    Explore patterns in relationships that are effective or ineffective at helping patient reach their goals, find satisfying experience.    Discuss new patterns or behaviors to engage in for improved social functioning.    Behavioral Activation    Discuss steps patient can take to become more involved in meaningful activity.    Identify barriers to these activities and explore possible solutions.    Mindfulness-Based Strategies    Discuss skills based on development and application of mindfulness.    Skills drawn from compassion-focused therapy, dialectical  behavior therapy, mindfulness-based stress reduction, mindfulness-based cognitive therapy, etc.      We have developed these goals together during our work to this point. Patient has assisted in the development of these goals and has agreed to this treatment plan.       Alfonso Farnsworth LP  March 4, 2021

## 2021-04-29 ENCOUNTER — TELEPHONE (OUTPATIENT)
Dept: FAMILY MEDICINE | Facility: CLINIC | Age: 81
End: 2021-04-29

## 2021-04-29 DIAGNOSIS — I10 BENIGN ESSENTIAL HYPERTENSION: ICD-10-CM

## 2021-04-29 NOTE — TELEPHONE ENCOUNTER
Health Call Center    Phone Message    May a detailed message be left on voicemail: yes     Reason for Call: Medication Question or concern regarding medication   Prescription Clarification  Name of Medication: losartan (COZAAR) 100 MG tablet  Prescribing Provider: Dr. Mueller   Pharmacy:    [id number] [address]   What on the order needs clarification? Patient wants to speak to a nurse to see why Dr. Mueller stopped her medication. Please call patient to advise. Thank you.          Action Taken: Message routed to:  [facility] ([facility]    Travel Screening: Not Applicable

## 2021-04-30 RX ORDER — LOSARTAN POTASSIUM 100 MG/1
100 TABLET ORAL DAILY
Qty: 90 TABLET | Refills: 0 | Status: SHIPPED | OUTPATIENT
Start: 2021-04-30 | End: 2021-07-15

## 2021-04-30 NOTE — TELEPHONE ENCOUNTER
Spoke to patient.  Confirm that provider did not stop losartan medication.  There was a duplicate losartan refill request so the duplicate got denied.  Informed patient Rx was sent to patient pharmacy today. Patient is out of losartan.  She will  med today.  Advise patient to request refill 5-7 days prior.  Patient report sometimes her insurance deny as it is too early to refill.          Edmundo Baker CMA ([facility]) at 11:47 AM on 4/30/2021

## 2021-04-30 NOTE — CONFIDENTIAL NOTE
losartan (COZAAR) 100 MG tablet     Last Written Prescription Date:  1/6/21  Last Fill Quantity: 90,   # refills: 0  Last Office Visit :10/26/20  Future Office visit: none    Routing refill request to provider for review/approval because:  JOCELYN junior+ past due.

## 2021-05-06 ENCOUNTER — VIRTUAL VISIT (OUTPATIENT)
Dept: BEHAVIORAL HEALTH | Facility: CLINIC | Age: 81
End: 2021-05-06
Payer: COMMERCIAL

## 2021-05-06 DIAGNOSIS — F43.23 ADJUSTMENT DISORDER WITH MIXED ANXIETY AND DEPRESSED MOOD: Primary | ICD-10-CM

## 2021-05-06 DIAGNOSIS — F43.21 GRIEF AT LOSS OF CHILD: ICD-10-CM

## 2021-05-06 DIAGNOSIS — Z63.4 GRIEF AT LOSS OF CHILD: ICD-10-CM

## 2021-05-06 PROCEDURE — 90834 PSYTX W PT 45 MINUTES: CPT | Mod: 95 | Performed by: PSYCHOLOGIST

## 2021-05-06 SDOH — SOCIAL STABILITY - SOCIAL INSECURITY: DISSAPEARANCE AND DEATH OF FAMILY MEMBER: Z63.4

## 2021-05-06 NOTE — PROGRESS NOTES
"[facility]: Integrated Behavioral Health  May 6, 2021        Behavioral Health Clinician Progress Note    Patient Name: Jessica Loaiza           Service Type: Phone Visit      Service Location:  Phone Visit      Session Start Time: 2:05  Session End Time: 2:54      Session Length: 38 - 52       Attendees: Client    Visit Activities (Refresh list every visit): [facility] Only and Phone Encounter    Jessica Loaiza is a 80 year old female who is being evaluated via a telephone visit.      The patient has been notified of the following:     \"We have found that certain health care needs can be provided without the need for a face to face visit.  This service lets us provide the care you need with a short phone conversation.      I will have full access to your [place] medical record during this entire phone call.   I will be taking notes for your medical record.     Since this is like an office visit, we will bill your insurance company for this service.  Please check with your medical insurance if this type of telephone visit/virtual care is covered.  You may be responsible for the cost of this service if insurance coverage is denied.      There are potential benefits and risks of telephone visits (e.g. limits to patient confidentiality) that differ from in-person visits.?  Confidentiality still applies for telephone services, and nobody will record the visit.  It is important to be in a quiet, private space that is free of distractions (including cell phone or other devices) during the visit.??     If during the course of the call I believe a telephone visit is not appropriate, you will not be charged for this service\"    Consent has been obtained for this service by care team member: yes.    Diagnostic Assessment Date: 2/15/2021  Treatment Plan Review Date: 6/4/2021  See Flowsheets for today's PHQ-9 and ZELDA-7 results  Previous PHQ-9:   PHQ-9 SCORE 10/26/2016 11/28/2017 6/6/2019 "   PHQ-9 Total Score - - -   PHQ-9 Total Score 6 2 4     Previous ZELDA-7:   ZELDA-7 SCORE 2019   Total Score 3       TANK LEVEL:  No flowsheet data found.    DATA  Extended Session (60+ minutes): No  Interactive Complexity: No  Crisis: No    Treatment Objective(s) Addressed in This Session:  Target Behavior(s): disease management/lifestyle changes related to grief and relationship concerns    Relationship Problems: will address relationship difficulties in a more adaptive manner  Grief / Loss: will increase understanding of normal grieving process, will engage in effective approach to address and resolve grief/loss issues and will process grief/loss issues in an adaptive manner    Current Stressors / Issues:  Azul presented today for a [facility] follow-up visit on the phone. Session today consisted of [facility] continuing to help Azul address her sense of grief/loss pertaining to the death of her grandson who  in an MVA. Azul cited she has been more distressed by her grief lately, noting she is thinking about her grandson more often and finds herself in tears on occasion. Explored how grief can be unpredictable and [facility] normalized her reactions. Provided her grief-related counseling that included psycho-education about grief reactions, support, and exploration of ways she could process losses in an adaptive manner, such as through continuing to engage with her family/social support system. Azul shared she has not yet followed-up with her daughter about their relationship issues, though she still hopes to talk to her eventually about why they have conflicts on occasion.     Progress on Treatment Objective(s) / Homework:  Satisfactory progress - ACTION (Actively working towards change); Intervened by reinforcing change plan / affirming steps taken    Also provided psychoeducation about behavioral health condition, symptoms, and treatment options    Insight and solution-focused therapy for grief      Care Plan review  completed: Yes    Medication Review:  No changes to current psychiatric medication(s)    Medication Compliance:  Yes    Changes in Health Issues:   None reported    Chemical Use Review:   Substance Use: Chemical use reviewed, no active concerns identified      Tobacco Use: No current tobacco use.      Assessment: Current Emotional / Mental Status (status of significant symptoms):  Risk status (Self / Other harm or suicidal ideation)  Patient denies a history of suicidal ideation, suicide attempts, self-injurious behavior, homicidal ideation, homicidal behavior and and other safety concerns     Patient denies current fears or concerns for personal safety.  Patient denies current or recent suicidal ideation or behaviors.  Patient denies current or recent homicidal ideation or behaviors.  Patient denies current or recent self injurious behavior or ideation.  Patient denies other safety concerns.     A safety and risk management plan has not been developed at this time, however patient was encouraged to call [place] [phone] should there be a change in any of these risk factors.    Douglas Suicide Severity Rating Scale (Short Version)  Douglas Suicide Severity Rating (Short Version) 11/20/2020   Over the past 2 weeks have you felt down, depressed, or hopeless? no   Over the past 2 weeks have you had thoughts of killing yourself? no   Have you ever attempted to kill yourself? no       Appearance:   Unable to assess   Eye Contact:   Unable to assess   Psychomotor Behavior: Unable to assess   Attitude:   Cooperative  Friendly Pleasant  Orientation:   All  Speech   Rate / Production: Normal    Volume:  Normal   Mood:    Sad  Grieving  Affect:    Appropriate   Thought Content:  Clear   Thought Form:  Coherent  Logical   Insight:    Fair      Diagnoses:  1. Adjustment disorder with mixed anxiety and depressed mood    2. Grief at loss of child          Collateral Reports Completed:  Not Applicable    Plan: (Homework,  other):  Azul was given information about behavioral services and encouraged to schedule a follow up appointment with the clinic [facility] in 3 weeks.  She was also given information about mental health symptoms and treatment options . CD Recommendations: No indications of CD issues.    Alfonso Farnsworth Psy.D,    Behavioral Health Clinician   [facility]     May 6, 2021    ______________________________________________________________________    [facility] Integrated Behavioral Health Treatment Plan    Client's Name: Jessica Loaiza  YOB: 1940    Date: 3/4/2021    DSM-V Diagnoses: Adjustment Disorders  309.28 (F43.23) With mixed anxiety and depressed mood   Psychosocial / Contextual Factors: grief experience; loss of grandson to MVA    Clinical Global Impressions  First:  Considering your total clinical experience with this particular patient population, how severe are the patient's symptoms at this time?: 3 (2/15/2021  9:42 AM)      Most recent:  Compared to the patient's condition at the START of treatment, this patient's condition is: 3 (2/15/2021  9:42 AM)      Referral / Collaboration:  Will collaborate with care team as indicated during treatment.    Anticipated number of session or this episode of care: 8+      MeasurableTreatment Goal(s) related to diagnosis / functional impairment(s)  Goal 1:  Patient will experience a reduction in depressive and anxious symptoms, along with a corresponding increase in positive emotion and life satisfaction.    Objective #A: Patient will experience a reduction in depressed mood, will develop more effective coping skills to manage depressive symptoms, will develop healthy cognitive patterns and beliefs, will increase ability to function adaptively and will continue to take medications as prescribed / participate in supportive activities and services    Status: Continued - Date(s): 3/4/2021    Objective #B: Patient will experience a  reduction in anxiety, will develop more effective coping skills to manage anxiety symptoms, will develop healthy cognitive patterns and beliefs and will increase ability to function adaptively  Status: Continued - Date(s): 3/4/2021    Objective #C: Patient will develop better understanding of triggers and coping strategies to stabilize mood  Status: Continued - Date(s): 3/4/2021    Goal 2:  Patient will identify and increase engagement in valued activity, i.e. improving social connections/relationships, pursuing occupational goals or personally meaningful pursuits, exploration of meaning in life.     Objective #A: Patient will identify meaningful activity in social, occupational and  personal goals, and increase behavioral activation around these goals   Status: Continued - Date(s): 3/4/2021    Objective #B: Patient will address relationship difficulties in a more adaptive manner  Status: Continued - Date(s): 3/4/2021    Objective #C: Patient will develop coping/problem-solving skills to facilitate more adaptive adjustment and will effectively address problems that interfere with adaptive functioning  Status: Continued - Date(s): 3/4/2021    Goal 3:  Patient will address grief difficulties in a more adaptive manner    Objective #A: Patient will increase understanding of normal grieving process   Status: Continued - Date(s): 3/4/2021    Objective #B: Patient will engage in effective approach to address and resolve grief/loss issues  Status: Continued - Date(s): 3/4/2021    Objective #C: Patient will process grief/loss issues in an adaptive manner  Status: Continued - Date(s): 3/4/2021    Possible Therapeutic Intervention(s)  Psycho-education regarding mental health diagnoses and treatment options    Skills training    Explore skills useful to client in current situation.    Skills include assertiveness, communication, conflict management, problem-solving, relaxation, etc.    Solution-Focused Therapy    Explore  patterns in patient's relationships and discuss options for new behaviors.    Explore patterns in patient's actions and choices and discuss options for new behaviors.    Cognitive-behavioral Therapy    Discuss common cognitive distortions, identify them in patient's life.    Explore ways to challenge, replace, and act against these cognitions.    Acceptance and Commitment Therapy    Explore and identify important values in patient's life.    Discuss ways to commit to behavioral activation around these values.    Psychodynamic psychotherapy    Discuss patient's emotional dynamics and issues and how they impact behaviors.    Explore patient's history of relationships and how they impact present behaviors.    Explore how to work with and make changes in these schemas and patterns.    Narrative Therapy    Explore the patient's story of his/her life from his/her perspective.    Explore alternate ways of understanding their experience, identifying exceptions, developing new themes.    Interpersonal Psychotherapy    Explore patterns in relationships that are effective or ineffective at helping patient reach their goals, find satisfying experience.    Discuss new patterns or behaviors to engage in for improved social functioning.    Behavioral Activation    Discuss steps patient can take to become more involved in meaningful activity.    Identify barriers to these activities and explore possible solutions.    Mindfulness-Based Strategies    Discuss skills based on development and application of mindfulness.    Skills drawn from compassion-focused therapy, dialectical behavior therapy, mindfulness-based stress reduction, mindfulness-based cognitive therapy, etc.      We have developed these goals together during our work to this point. Patient has assisted in the development of these goals and has agreed to this treatment plan.       Alfonso Farnsworth LP  March 4, 2021

## 2021-05-28 ENCOUNTER — VIRTUAL VISIT (OUTPATIENT)
Dept: BEHAVIORAL HEALTH | Facility: CLINIC | Age: 81
End: 2021-05-28
Payer: COMMERCIAL

## 2021-05-28 DIAGNOSIS — Z53.9 ERRONEOUS ENCOUNTER--DISREGARD: Primary | ICD-10-CM

## 2021-05-28 NOTE — PROGRESS NOTES
Appointment cancelled by patient.      Appointment rescheduled for 6/14/2021 at 1:00 per patient request when called. She indicated forgetting about the appointment and had family over.     Alfonso Farnsworth Psy.D, LP   Behavioral Health Clinician   -[facility] [place]

## 2021-06-14 ENCOUNTER — VIRTUAL VISIT (OUTPATIENT)
Dept: BEHAVIORAL HEALTH | Facility: CLINIC | Age: 81
End: 2021-06-14
Payer: COMMERCIAL

## 2021-06-14 DIAGNOSIS — Z53.9 NO SHOW: Primary | ICD-10-CM

## 2021-06-14 NOTE — PROGRESS NOTES
No Show:    This patient was a no show for this scheduled appointment. Made two call attempts, both went straight to . Left a message with instructions of how to reschedule.     Alfonso Farnsworth, YOLANDA    June 14, 2021

## 2021-07-05 ENCOUNTER — VIRTUAL VISIT (OUTPATIENT)
Dept: BEHAVIORAL HEALTH | Facility: CLINIC | Age: 81
End: 2021-07-05
Payer: COMMERCIAL

## 2021-07-05 DIAGNOSIS — Z63.4 GRIEF AT LOSS OF CHILD: ICD-10-CM

## 2021-07-05 DIAGNOSIS — F43.21 GRIEF AT LOSS OF CHILD: ICD-10-CM

## 2021-07-05 DIAGNOSIS — F43.23 ADJUSTMENT DISORDER WITH MIXED ANXIETY AND DEPRESSED MOOD: Primary | ICD-10-CM

## 2021-07-05 PROCEDURE — 90832 PSYTX W PT 30 MINUTES: CPT | Mod: 95 | Performed by: PSYCHOLOGIST

## 2021-07-05 SDOH — SOCIAL STABILITY - SOCIAL INSECURITY: DISSAPEARANCE AND DEATH OF FAMILY MEMBER: Z63.4
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
## 2021-07-12 DIAGNOSIS — I10 BENIGN ESSENTIAL HYPERTENSION: ICD-10-CM

## 2021-07-15 RX ORDER — LOSARTAN POTASSIUM 100 MG/1
100 TABLET ORAL DAILY
Qty: 60 TABLET | Refills: 0 | Status: SHIPPED | OUTPATIENT
Start: 2021-07-15 | End: 2021-08-25

## 2021-07-15 NOTE — TELEPHONE ENCOUNTER
LOSARTAN 100MG TABLET   Last Written Prescription Date:  4/30/2021  Last Fill Quantity: 90,   # refills: 0  Last Office Visit : 10/26/2020  Future Office visit:  8/25/2021  60 Tabs, to cover Pt until office visit on 8/25/2021      Brenda Catalan RN  Central Triage Red Flags/Med Refills

## 2021-07-17 DIAGNOSIS — F32.89 OTHER DEPRESSION: ICD-10-CM

## 2021-07-20 ENCOUNTER — VIRTUAL VISIT (OUTPATIENT)
Dept: BEHAVIORAL HEALTH | Facility: CLINIC | Age: 81
End: 2021-07-20
Payer: COMMERCIAL

## 2021-07-20 DIAGNOSIS — I10 BENIGN ESSENTIAL HYPERTENSION: ICD-10-CM

## 2021-07-20 DIAGNOSIS — Z53.9 ERRONEOUS ENCOUNTER--DISREGARD: Primary | ICD-10-CM

## 2021-07-20 RX ORDER — CITALOPRAM HYDROBROMIDE 20 MG/1
20 TABLET ORAL DAILY
Qty: 35 TABLET | Refills: 0 | Status: SHIPPED | OUTPATIENT
Start: 2021-07-20 | End: 2021-08-25

## 2021-07-20 NOTE — PROGRESS NOTES
Appointment Cancelled by Patient.     Azul requested we reschedule our appointment today due to missing this writer's first call attempt and having people over. We rescheduled for 8/6/2021 at 2:30.     Alfonso Farnsworth Psy.D, LP   Behavioral Health Clinician   -[facility] [place]     July 20, 2021

## 2021-07-20 NOTE — TELEPHONE ENCOUNTER
CITALOPRAM 20MG TABLET  Last Written Prescription Date:  10/26/2020  Last Fill Quantity: 90,   # refills: 3  Last Office Visit : 10/26/2020  Future Office visit:  8/25/2021  35 Days to last Pt until August office visit.        Brenda Catalan RN  Central Triage Red Flags/Med Refills

## 2021-07-22 RX ORDER — LOSARTAN POTASSIUM 100 MG/1
TABLET ORAL
Qty: 60 TABLET | Refills: 0 | OUTPATIENT
Start: 2021-07-22

## 2021-08-06 ENCOUNTER — VIRTUAL VISIT (OUTPATIENT)
Dept: BEHAVIORAL HEALTH | Facility: CLINIC | Age: 81
End: 2021-08-06
Payer: COMMERCIAL

## 2021-08-06 DIAGNOSIS — F43.23 ADJUSTMENT DISORDER WITH MIXED ANXIETY AND DEPRESSED MOOD: Primary | ICD-10-CM

## 2021-08-06 DIAGNOSIS — F43.21 GRIEF AT LOSS OF CHILD: ICD-10-CM

## 2021-08-06 DIAGNOSIS — Z63.4 GRIEF AT LOSS OF CHILD: ICD-10-CM

## 2021-08-06 DIAGNOSIS — I10 BENIGN ESSENTIAL HYPERTENSION: ICD-10-CM

## 2021-08-06 PROCEDURE — 90832 PSYTX W PT 30 MINUTES: CPT | Mod: 95 | Performed by: PSYCHOLOGIST

## 2021-08-06 SDOH — SOCIAL STABILITY - SOCIAL INSECURITY: DISSAPEARANCE AND DEATH OF FAMILY MEMBER: Z63.4
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
## 2021-08-09 RX ORDER — METOPROLOL SUCCINATE 50 MG/1
50 TABLET, EXTENDED RELEASE ORAL DAILY
Qty: 90 TABLET | Refills: 0 | Status: SHIPPED | OUTPATIENT
Start: 2021-08-09 | End: 2021-08-25

## 2021-08-25 ENCOUNTER — ANCILLARY PROCEDURE (OUTPATIENT)
Dept: GENERAL RADIOLOGY | Facility: CLINIC | Age: 81
End: 2021-08-25
Attending: FAMILY MEDICINE
Payer: COMMERCIAL

## 2021-08-25 ENCOUNTER — OFFICE VISIT (OUTPATIENT)
Dept: FAMILY MEDICINE | Facility: CLINIC | Age: 81
End: 2021-08-25
Payer: COMMERCIAL

## 2021-08-25 ENCOUNTER — OFFICE VISIT (OUTPATIENT)
Dept: ORTHOPEDICS | Facility: CLINIC | Age: 81
End: 2021-08-25
Payer: COMMERCIAL

## 2021-08-25 ENCOUNTER — LAB (OUTPATIENT)
Dept: LAB | Facility: CLINIC | Age: 81
End: 2021-08-25
Payer: COMMERCIAL

## 2021-08-25 VITALS
WEIGHT: 200.6 LBS | RESPIRATION RATE: 16 BRPM | TEMPERATURE: 98 F | HEART RATE: 67 BPM | OXYGEN SATURATION: 96 % | HEIGHT: 61 IN | BODY MASS INDEX: 37.87 KG/M2 | DIASTOLIC BLOOD PRESSURE: 84 MMHG | SYSTOLIC BLOOD PRESSURE: 149 MMHG

## 2021-08-25 DIAGNOSIS — I10 BENIGN ESSENTIAL HYPERTENSION: ICD-10-CM

## 2021-08-25 DIAGNOSIS — K21.9 GASTROESOPHAGEAL REFLUX DISEASE WITHOUT ESOPHAGITIS: ICD-10-CM

## 2021-08-25 DIAGNOSIS — M79.641 BILATERAL HAND PAIN: Primary | ICD-10-CM

## 2021-08-25 DIAGNOSIS — G47.33 OSA (OBSTRUCTIVE SLEEP APNEA): ICD-10-CM

## 2021-08-25 DIAGNOSIS — M79.645 BILATERAL THUMB PAIN: ICD-10-CM

## 2021-08-25 DIAGNOSIS — E78.00 HIGH CHOLESTEROL: ICD-10-CM

## 2021-08-25 DIAGNOSIS — M79.644 BILATERAL THUMB PAIN: ICD-10-CM

## 2021-08-25 DIAGNOSIS — Z78.0 POSTMENOPAUSAL STATUS: ICD-10-CM

## 2021-08-25 DIAGNOSIS — F32.89 OTHER DEPRESSION: ICD-10-CM

## 2021-08-25 DIAGNOSIS — M79.642 BILATERAL HAND PAIN: Primary | ICD-10-CM

## 2021-08-25 DIAGNOSIS — R60.9 EDEMA, UNSPECIFIED TYPE: ICD-10-CM

## 2021-08-25 DIAGNOSIS — Z00.00 HEALTH CARE MAINTENANCE: Primary | ICD-10-CM

## 2021-08-25 LAB
CHOLEST SERPL-MCNC: 256 MG/DL
FASTING STATUS PATIENT QL REPORTED: YES
HDLC SERPL-MCNC: 39 MG/DL
LDLC SERPL CALC-MCNC: 169 MG/DL
NONHDLC SERPL-MCNC: 217 MG/DL
TRIGL SERPL-MCNC: 238 MG/DL

## 2021-08-25 PROCEDURE — 36415 COLL VENOUS BLD VENIPUNCTURE: CPT | Performed by: PATHOLOGY

## 2021-08-25 PROCEDURE — 90471 IMMUNIZATION ADMIN: CPT | Performed by: FAMILY MEDICINE

## 2021-08-25 PROCEDURE — 90715 TDAP VACCINE 7 YRS/> IM: CPT | Performed by: FAMILY MEDICINE

## 2021-08-25 PROCEDURE — 80061 LIPID PANEL: CPT | Performed by: PATHOLOGY

## 2021-08-25 PROCEDURE — 99215 OFFICE O/P EST HI 40 MIN: CPT | Mod: 25 | Performed by: FAMILY MEDICINE

## 2021-08-25 PROCEDURE — 99203 OFFICE O/P NEW LOW 30 MIN: CPT | Performed by: FAMILY MEDICINE

## 2021-08-25 PROCEDURE — 80053 COMPREHEN METABOLIC PANEL: CPT | Performed by: PATHOLOGY

## 2021-08-25 PROCEDURE — 73140 X-RAY EXAM OF FINGER(S): CPT | Mod: RT | Performed by: RADIOLOGY

## 2021-08-25 RX ORDER — LOSARTAN POTASSIUM 100 MG/1
100 TABLET ORAL DAILY
Qty: 90 TABLET | Refills: 3 | Status: SHIPPED | OUTPATIENT
Start: 2021-08-25 | End: 2022-09-07

## 2021-08-25 RX ORDER — CITALOPRAM HYDROBROMIDE 20 MG/1
20 TABLET ORAL DAILY
Qty: 90 TABLET | Refills: 3 | Status: SHIPPED | OUTPATIENT
Start: 2021-08-25 | End: 2022-09-07

## 2021-08-25 RX ORDER — METOPROLOL SUCCINATE 50 MG/1
50 TABLET, EXTENDED RELEASE ORAL DAILY
Qty: 90 TABLET | Refills: 3 | Status: SHIPPED | OUTPATIENT
Start: 2021-08-25 | End: 2022-09-07

## 2021-08-25 RX ORDER — HYDROCHLOROTHIAZIDE 25 MG/1
25 TABLET ORAL DAILY
Qty: 90 TABLET | Refills: 3 | Status: SHIPPED | OUTPATIENT
Start: 2021-08-25 | End: 2023-05-12

## 2021-08-25 ASSESSMENT — PAIN SCALES - GENERAL: PAINLEVEL: NO PAIN (0)

## 2021-08-25 ASSESSMENT — MIFFLIN-ST. JEOR: SCORE: 1331.26

## 2021-08-25 NOTE — PROGRESS NOTES
Medicare Annual Wellness Visit Previsit note    This 80 year old year old female presents for a  Medicare Wellness Exam.           Medical Care     Have you been to an ER or a hospital in the last year? No  What other specialists or organizations are involved in your medical care?  N/A  Current providers sharing in care for this patient include:  Patient Care Team       Relationship Specialty Notifications Start End    Babatunde Mueller MD PCP - General Family Practice  1/4/12     Phone: [phone] Fax: [phone]         [address] [facility] [address] [facility] [address]    Isaías Landry MD MD Cardiology Admissions 10/3/14     Phone: [phone] Pager: [phone] Fax: [phone]        [address] [facility] [address]    Janae Myrick MD MD INTERNAL MEDICINE - ENDOCRINOLOGY, DIABETES & METABOLISM  11/17/15     Phone: [phone] Fax: [phone]         [address] [facility] [address]    Susan Bear MD MD General Surgery  11/23/15     Phone: [phone] Fax: [phone]         [address] [facility] [address]    Babatunde Mueller MD Assigned PCP   11/8/20     Phone: [phone] Fax: [phone]         [address] [facility] [address]                 Social History     Marital Status:  Who lives in your household? Self  Does your home have any of the following safety concerns? Loose rugs in the hallway, no grab bars in the bathroom, no handrails on the stairs or have poorly lit areas?  No  Do you feel threatened or controlled by a partner, ex-partner or anyone in your life? No  Has anyone hurt you physically, for example by pushing, hitting, slapping or kicking you   or forcing you to have sex? No  Do you need help with the phone, transportation, shopping, preparing meals, housework, laundry, medications or managing money? No   Have you noticed any hearing difficulties? No      Risk Behaviors and Healthy Habits     How many servings of  fruits and vegetables do you eat a day? 1-2 servings  How often do you exercise and what do you do? 40 minutes 1 day a week  Do you frequently ride without a seatbelt? No  Do you use tobacco?  No  Do you use any other drugs? No       Do you use alcohol?No      Sexual Health     Are you sexually active? No   If yes, with men, women, or both? N/A  If yes, do you more than one current partner?N/A  If yes, do you use condoms? N/A  Have you had any sexually transmitted infections? N/A  Any sexual concerns? No       FOR WOMEN ONLY  What year did you stop having periods? 1984  Any vaginal bleeding in the last year? No  Have you ever had an abnormal Pap smear? No

## 2021-08-25 NOTE — LETTER
8/25/2021      RE: Jessica Loaiza  [address]  [place] [address]         [facility]  SPORTS & ORTHOPEDIC CLINIC VISIT     Aug 25, 2021        SUBJECTIVE  Ms. Loaiza is a 80 year old female who is seen in consultation at the request of Babatunde Mueller MD for evaluation of bilateral thumb pain.  Azul suffered a [place] about 2 years ago [place].  Her thumbs are been bothering her since this time.  Her pain waxes and wanes, her left thumb was traditionally more painful, although both are painful now.  She points to the base of each thumb.  She denies any weakness.    The patient is seen with their daughter.  The patient is Right handed    Onset: 2019. Patient describes [place]. She has been in pain since.   Location of Pain: bilateral thumb pain   Worsened by: Pain at rest  Better with: NA  Treatments tried: no treatment tried to date  Associated symptoms: no distal numbness or tingling; denies swelling or warmth    Orthopedic/Surgical history: NO  Social History/Occupation: NO       REVIEW OF SYSTEMS:    Do you have fever, chills, weight loss? No    Do you have any vision problems? No    Do you have any chest pain or edema? No    Do you have any shortness of breath or wheezing?  No    Do you have stomach problems? No    Do you have any numbness or focal weakness? No    Do you have diabetes? No    Do you have problems with bleeding or clotting? No    Do you have an rashes or other skin lesions? No    OBJECTIVE:  General  - normal appearance, in no obvious distress  HEENT  - conjunctivae not injected, moist mucous membranes  CV  - normal radial pulse  Pulm  - normal respiratory pattern, non-labored  Musculoskeletal - right and left thumb  - inspection: no atrophy, normal joint alignment, no swelling  - palpation: CMC tenderness, no soft tissue tenderness, no tenderness at the anatomical snuffbox  - ROM:   MCP 70 deg flexion   0 deg extension   IP 90 deg flexion   0 deg extension  - strength: 5/5  strength, 5/5 wrist abduction, 5/5 flexion, extension, pronation, supination, adduction  - special tests:  (-) varus  (-) valgus  Neuro  - no numbness, no motor deficit, grossly normal coordination, normal muscle tone  Skin  - no ecchymosis, erythema, warmth, or induration, no obvious rash  Psych  - interactive, appropriate, normal mood and affect          ASSESSMENT & PLAN  Azul is a 80 year old woman here with bilateral thumb pain.  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    Her pain is likely secondary to arthritis, we discussed pathophysiology and some treatment strategies.  I am referring her to hand therapy, I do think she will do well with focused strengthening and treatment.  I am also recommending topical treatment with diclofenac gel.    In the future we could consider injection based therapies, if amenable.      Neal Oviedo DO  [facility] SP[facility] [place]        Neal Oivedo DO

## 2021-08-25 NOTE — PROGRESS NOTES
"    Assessment & Plan     Benign essential hypertension  At home BP good  - losartan (COZAAR) 100 MG tablet; Take 1 tablet (100 mg) by mouth daily  - metoprolol succinate ER (TOPROL-XL) 50 MG 24 hr tablet; Take 1 tablet (50 mg) by mouth daily  - Lipid panel reflex to direct LDL Fasting; Future    Edema  Same  - hydrochlorothiazide (HYDRODIURIL) 25 MG tablet; Take 1 tablet (25 mg) by mouth daily    Other depression  Cont therapy ongoing grief  - citalopram (CELEXA) 20 MG tablet; Take 1 tablet (20 mg) by mouth daily    Gastroesophageal reflux disease without esophagitis  Very helpful  - omeprazole (PRILOSEC) 20 MG DR capsule; Take 1 capsule (20 mg) by mouth daily    Health care maintenance  Due, do shingrix at local drugstore  - TDAP VACCINE (Adacel, Boostrix)  [[id number]]    Postmenopausal status  Due  - Dexa hip/pelvis/spine*; Future    MARCELO (obstructive sleep apnea)  Due  - SLEEP EVALUATION & MANAGEMENT REFERRAL - ADULT -; Future    Bilateral thumb pain  See Sp Med, c/w DJD  - Orthopedic  Referral; Future      54 minutes spent on the date of the encounter doing chart review, history and exam, documentation and further activities per the note    BMI:   Estimated body mass index is 37.59 kg/m  as calculated from the following:    Height as of this encounter: 1.556 m (5' 1.25\").    Weight as of this encounter: 91 kg (200 lb 9.6 oz).     Babatunde Mueller MD  [facility]IMARY [facility]ARE CLINIC ABRAHAM Liang is a 80 year old who presents for the following health issues    HPI Here in f/u multiple issues.  One, grief, see my  notes, grandson , she sees therapist very helpful but whole family still devastated. A brother  too, CHF.SSRI helps.  Two, lives in [place] alone, some neighbor help, overall ok but somewhat isolated.  Three, MARCELO, due for follow-up  Four, 2019 fell and hurt hands, since then both thumb bases hurt  Five, htn due for labs  Six, gerd helped by current " meds, tolerated  Seven, due for dexa  Eight, due for dtap, I suggest shingrix at drugstore near home  Past Medical History:   Diagnosis Date     Cataracts, bilateral      Dyslipidemia      HTN (hypertension)      Statin intolerance 10/22/2015     No past surgical history on file.  Current Outpatient Medications   Medication     aspirin 81 MG tablet     Calcium Citrate-Vitamin D (CITRACAL + D PO)     Cetirizine HCl (ZYRTEC PO)     citalopram (CELEXA) 20 MG tablet     citalopram (CELEXA) 20 MG tablet     COPPER PO     cyabnocobalamin (VITAMIN B-12) 2500 MCG sublingual tablet     econazole nitrate 1 % cream     ezetimibe (ZETIA) 10 MG tablet     hydrochlorothiazide (HYDRODIURIL) 25 MG tablet     losartan (COZAAR) 100 MG tablet     metoprolol succinate ER (TOPROL-XL) 50 MG 24 hr tablet     minoxidil (ROGAINE) 2 % external solution     Multiple Vitamins-Minerals (CENTRUM SILVER PO)     Omega-3 Fatty Acids (OMEGA 3 PO)     omeprazole (PRILOSEC) 20 MG CR capsule     omeprazole (PRILOSEC) 20 MG DR capsule     order for DME     ORDER FOR DME     polyethylene glycol 0.4%- propylene glycol 0.3% (SYSTANE ULTRA) 0.4-0.3 % SOLN ophthalmic solution     psyllium (METAMUCIL SMOOTH TEXTURE) 63 % POWD     RaNITidine HCl (ZANTAC PO)     SALICYLIC ACID 40%, UREA 20% IN PETROLEUM, FV COMPOUNDED,    CREAM     UNABLE TO FIND     Urea 40 % CREA     No current facility-administered medications for this visit.     Allergies   Allergen Reactions     Gabapentin Nausea     Ibuprofen Sodium GI Disturbance     Simvastatin Cramps     Other reaction(s): Other  Groin and leg cramps     No family history on file.  Social History     Socioeconomic History     Marital status:      Spouse name: Not on file     Number of children: Not on file     Years of education: Not on file     Highest education level: Not on file   Occupational History     Not on file   Tobacco Use     Smoking status: Never Smoker     Smokeless tobacco: Never Used   Substance  "and Sexual Activity     Alcohol use: Yes     Alcohol/week: 0.8 standard drinks     Types: 1 Glasses of wine per week     Drug use: Not on file     Sexual activity: Not on file   Other Topics Concern     Parent/sibling w/ CABG, MI or angioplasty before 65F 55M? Not Asked   Social History Narrative     Not on file     Social Determinants of Health     Financial Resource Strain:      Difficulty of Paying Living Expenses:    Food Insecurity:      Worried About Running Out of Food in the Last Year:      Ran Out of Food in the Last Year:    Transportation Needs:      Lack of Transportation (Medical):      Lack of Transportation (Non-Medical):    Physical Activity:      Days of Exercise per Week:      Minutes of Exercise per Session:    Stress:      Feeling of Stress :    Social Connections:      Frequency of Communication with Friends and Family:      Frequency of Social Gatherings with Friends and Family:      Attends Jew Services:      Active Member of Clubs or Organizations:      Attends Club or Organization Meetings:      Marital Status:    Intimate Partner Violence:      Fear of Current or Ex-Partner:      Emotionally Abused:      Physically Abused:      Sexually Abused:                Review of Systems         Objective    BP (!) 149/84   Pulse 67   Temp 98  F (36.7  C) (Oral)   Resp 16   Ht 1.556 m (5' 1.25\")   Wt 91 kg (200 lb 9.6 oz)   SpO2 96%   BMI 37.59 kg/m    Body mass index is 37.59 kg/m .  Physical Exam   GENERAL: healthy, alert and no distress  NECK: no adenopathy, no asymmetry, masses, or scars and thyroid normal to palpation  RESP: lungs clear to auscultation - no rales, rhonchi or wheezes  CV: regular rate and rhythm, normal S1 S2, no S3 or S4, no murmur, click or rub, no peripheral edema and peripheral pulses strong  ABDOMEN: soft, nontender, no hepatosplenomegaly, no masses and bowel sounds normal  MS: no gross musculoskeletal defects noted, no edema, neg finkelsteins, both thumb base " full rom mild tender no infl

## 2021-08-25 NOTE — NURSING NOTE
Chief Complaint   Patient presents with     Physical     Patient comes in for a physical exam.          Rosalio Richardson MA on 8/25/2021 at 2:25 PM

## 2021-08-25 NOTE — PROGRESS NOTES
[facility]  SPORTS & ORTHOPEDIC CLINIC VISIT     Aug 25, 2021        SUBJECTIVE  Ms. Loaiza is a 80 year old female who is seen in consultation at the request of Babatunde Mueller MD for evaluation of bilateral thumb pain.  Azul suffered a [place] about 2 years ago [place].  Her thumbs are been bothering her since this time.  Her pain waxes and wanes, her left thumb was traditionally more painful, although both are painful now.  She points to the base of each thumb.  She denies any weakness.    The patient is seen with their daughter.  The patient is Right handed    Onset: 2019. Patient describes injury as [place]. She has been in pain since.   Location of Pain: bilateral thumb pain   Worsened by: Pain at rest  Better with: NA  Treatments tried: no treatment tried to date  Associated symptoms: no distal numbness or tingling; denies swelling or warmth    Orthopedic/Surgical history: NO  Social History/Occupation: NO       REVIEW OF SYSTEMS:    Do you have fever, chills, weight loss? No    Do you have any vision problems? No    Do you have any chest pain or edema? No    Do you have any shortness of breath or wheezing?  No    Do you have stomach problems? No    Do you have any numbness or focal weakness? No    Do you have diabetes? No    Do you have problems with bleeding or clotting? No    Do you have an rashes or other skin lesions? No    OBJECTIVE:  General  - normal appearance, in no obvious distress  HEENT  - conjunctivae not injected, moist mucous membranes  CV  - normal radial pulse  Pulm  - normal respiratory pattern, non-labored  Musculoskeletal - right and left thumb  - inspection: no atrophy, normal joint alignment, no swelling  - palpation: CMC tenderness, no soft tissue tenderness, no tenderness at the anatomical snuffbox  - ROM:  MCP 70 deg flexion   0 deg extension   IP 90 deg flexion   0 deg extension  - strength: 5/5   strength, 5/5 wrist abduction, 5/5 flexion, extension, pronation, supination, adduction  - special tests:  (-) varus  (-) valgus  Neuro  - no numbness, no motor deficit, grossly normal coordination, normal muscle tone  Skin  - no ecchymosis, erythema, warmth, or induration, no obvious rash  Psych  - interactive, appropriate, normal mood and affect          ASSESSMENT & PLAN  Azul is a 80 year old woman here with bilateral thumb pain.  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    Her pain is likely secondary to arthritis, we discussed pathophysiology and some treatment strategies.  I am referring her to hand therapy, I do think she will do well with focused strengthening and treatment.  I am also recommending topical treatment with diclofenac gel.    In the future we could consider injection based therapies, if amenable.      Neal Oviedo,   [facility] [place]

## 2021-08-26 ENCOUNTER — ANCILLARY PROCEDURE (OUTPATIENT)
Dept: BONE DENSITY | Facility: CLINIC | Age: 81
End: 2021-08-26
Attending: FAMILY MEDICINE
Payer: COMMERCIAL

## 2021-08-26 ENCOUNTER — TELEPHONE (OUTPATIENT)
Dept: INTERNAL MEDICINE | Facility: CLINIC | Age: 81
End: 2021-08-26

## 2021-08-26 DIAGNOSIS — E78.00 HIGH CHOLESTEROL: Primary | ICD-10-CM

## 2021-08-26 DIAGNOSIS — Z78.0 POSTMENOPAUSAL STATUS: ICD-10-CM

## 2021-08-26 LAB
ALBUMIN SERPL-MCNC: 3.7 G/DL (ref 3.4–5)
ALP SERPL-CCNC: 69 U/L (ref 40–150)
ALT SERPL W P-5'-P-CCNC: 30 U/L (ref 0–50)
ANION GAP SERPL CALCULATED.3IONS-SCNC: 10 MMOL/L (ref 3–14)
AST SERPL W P-5'-P-CCNC: 25 U/L (ref 0–45)
BILIRUB SERPL-MCNC: 0.8 MG/DL (ref 0.2–1.3)
BUN SERPL-MCNC: 14 MG/DL (ref 7–30)
CALCIUM SERPL-MCNC: 9.7 MG/DL (ref 8.5–10.1)
CHLORIDE BLD-SCNC: 105 MMOL/L (ref 94–109)
CO2 SERPL-SCNC: 28 MMOL/L (ref 20–32)
CREAT SERPL-MCNC: 0.8 MG/DL (ref 0.52–1.04)
GFR SERPL CREATININE-BSD FRML MDRD: 71 ML/MIN/1.73M2
GLUCOSE BLD-MCNC: 99 MG/DL (ref 70–99)
POTASSIUM BLD-SCNC: 4.4 MMOL/L (ref 3.4–5.3)
PROT SERPL-MCNC: 7.2 G/DL (ref 6.8–8.8)
SODIUM SERPL-SCNC: 143 MMOL/L (ref 133–144)

## 2021-08-26 PROCEDURE — 77080 DXA BONE DENSITY AXIAL: CPT | Performed by: INTERNAL MEDICINE

## 2021-08-26 NOTE — TELEPHONE ENCOUNTER
Added.    Soon-Mi      ----- Message from Babatunde Mueller MD sent at 8/26/2021  9:07 AM CDT -----  Can you have lab add comp met to yesterday blood? Re: hi chol

## 2021-08-30 ENCOUNTER — VIRTUAL VISIT (OUTPATIENT)
Dept: BEHAVIORAL HEALTH | Facility: CLINIC | Age: 81
End: 2021-08-30
Payer: COMMERCIAL

## 2021-08-30 DIAGNOSIS — Z53.9 ERRONEOUS ENCOUNTER--DISREGARD: Primary | ICD-10-CM

## 2021-08-30 NOTE — PROGRESS NOTES
Appointment cancelled by patient. Azul requested we reschedule our [facility] appointment today due to other previous obligations today. We rescheduled today for 9/9/2021 at 2:00.    Alfonso Farnsworth Psy.D, YOLANDA   Behavioral Health Clinician   -[facility]     August 30, 2021

## 2021-09-09 ENCOUNTER — VIRTUAL VISIT (OUTPATIENT)
Dept: BEHAVIORAL HEALTH | Facility: CLINIC | Age: 81
End: 2021-09-09
Payer: COMMERCIAL

## 2021-09-09 DIAGNOSIS — Z53.9 NO SHOW: Primary | ICD-10-CM

## 2021-09-09 NOTE — PROGRESS NOTES
No Show:    This patient was a no show for this scheduled appointment. Made two call attempts, both went to . Left a message with instructions of how to reschedule.     Alfonso Farnsworth, LP

## 2022-04-26 ENCOUNTER — NURSE TRIAGE (OUTPATIENT)
Dept: NURSING | Facility: CLINIC | Age: 82
End: 2022-04-26

## 2022-04-26 NOTE — TELEPHONE ENCOUNTER
Nurse Triage SBAR    Is this a 2nd Level Triage?   Yes    Situation:    High blood pressure concerns    Background/Assessment:   Pt was seen for health assessment test today.  Pt had her blood pressure taking today.    It was B/P 205/105.   Asymptomatic.     But reporting,  She has a headache off and on.    But did not think any thing of it.    Pt wondering if she needs an adjustment in her blood pressure medication.      Would like a call back on her clinic.        Protocol Recommended Disposition:   See Today In Office    Recommendation:     Please call the Pt back for further assistance.    Please review and call the Pt back for further assistance.    Brenda Catalan RN  Central Triage Red Flags/Med Refills      Reason for Disposition    Systolic BP >= 180 OR Diastolic >= 110    Additional Information    Negative: Sounds like a life-threatening emergency to the triager    Negative: Pregnant > 20 weeks or postpartum (< 6 weeks after delivery) and new hand or face swelling    Negative: Pregnant > 20 weeks and BP > 140/90    Negative: Systolic BP >= 160 OR Diastolic >= 100, and any cardiac or neurologic symptoms (e.g., chest pain, difficulty breathing, unsteady gait, blurred vision)    Negative: Patient sounds very sick or weak to the triager    Negative: BP Systolic BP >= 140 OR Diastolic >= 90 and postpartum (from 0 to 6 weeks after delivery)    Negative: Systolic BP >= 180 OR Diastolic >= 110, and missed most recent dose of blood pressure medication    Protocols used: HIGH BLOOD PRESSURE-A-OH

## 2022-04-26 NOTE — TELEPHONE ENCOUNTER
Attempted to reach Azul via telephone regarding recent call into triage but reached voicemail. Left VM recommending her to make an appointment if she has symptoms or to be evaluated, but also recommended home monitor and recording BPs over the next few days to see more of what her average BP is, to write them down and call back with some readings. Clinic number given for scheduling.  Juanita Fagan RN

## 2022-04-27 NOTE — TELEPHONE ENCOUNTER
RN called patient and asked for update about how she is doing today.  Patient relayed she is better and headache is gone.  Patient is agreeable to taking B/P readings with her at home device, and will share with Dr. Mueller at next virtual appt.  RN offered a virtual appt for 5/2/22 and patient was agreeable, appt was made.  Patient let RN know she had stopped taking hctz, but she will resume since this has been prescribed by Dr. Mueller at last visit on 8/25/21.    Savannah Murray RN on 4/27/2022 at 10:58 AM

## 2022-05-02 ENCOUNTER — VIRTUAL VISIT (OUTPATIENT)
Dept: FAMILY MEDICINE | Facility: CLINIC | Age: 82
End: 2022-05-02
Payer: COMMERCIAL

## 2022-05-02 VITALS — BODY MASS INDEX: 34.93 KG/M2 | HEIGHT: 61 IN | WEIGHT: 185 LBS

## 2022-05-02 DIAGNOSIS — G47.33 OSA (OBSTRUCTIVE SLEEP APNEA): ICD-10-CM

## 2022-05-02 DIAGNOSIS — I10 BENIGN ESSENTIAL HYPERTENSION: Primary | ICD-10-CM

## 2022-05-02 PROCEDURE — 99213 OFFICE O/P EST LOW 20 MIN: CPT | Mod: 95 | Performed by: FAMILY MEDICINE

## 2022-05-02 NOTE — PROGRESS NOTES
"Azul is a 81 year old who is being evaluated via a billable telephone visit.      What phone number would you like to be contacted at? [phone]  How would you like to obtain your AVS? Mail a copy      Azul is a 81 year old who is being evaluated via a billable telephone visit.            Assessment & Plan     Benign essential hypertension  See HPI    MARCELO (obstructive sleep apnea)  Same    My RN will call her next week, see home BP log, make appt               BMI:   Estimated body mass index is 34.67 kg/m  as calculated from the following:    Height as of this encounter: 1.556 m (5' 1.25\").    Weight as of this encounter: 83.9 kg (185 lb).           No follow-ups on file.    Babatunde Mueller MD  [facility]   Azul is a 81 year old who presents for the following health issues     HPI   BP: saw a local health screening clinic, BP was hi. Not taking hydrochlorothiazide att the time, is back on along w/ beta/arb. Will be more active w/ spring, [place] up all [place].  MARCELO: not treating, supplies broken, is in touch w/ MARCELO office, I discussed how MARCELO/BP interact  No other c/o  Can do home BP, not doing, we agree she'll cont meds, do daily home BP, my RN will call her in a week for numbers and to help make in person appt, by then she should know when next it town    Past Medical History:   Diagnosis Date     Cataracts, bilateral      Dyslipidemia      HTN (hypertension)      Statin intolerance 10/22/2015     No past surgical history on file.  Current Outpatient Medications   Medication     aspirin 81 MG tablet     Calcium Citrate-Vitamin D (CITRACAL + D PO)     Cetirizine HCl (ZYRTEC PO)     citalopram (CELEXA) 20 MG tablet     citalopram (CELEXA) 20 MG tablet     cyabnocobalamin (VITAMIN B-12) 2500 MCG sublingual tablet     econazole nitrate 1 % cream     hydrochlorothiazide (HYDRODIURIL) 25 MG tablet     losartan " (COZAAR) 100 MG tablet     metoprolol succinate ER (TOPROL-XL) 50 MG 24 hr tablet     minoxidil (ROGAINE) 2 % external solution     Multiple Vitamins-Minerals (CENTRUM SILVER PO)     Omega-3 Fatty Acids (OMEGA 3 PO)     omeprazole (PRILOSEC) 20 MG CR capsule     omeprazole (PRILOSEC) 20 MG DR capsule     order for DME     ORDER FOR DME     polyethylene glycol 0.4%- propylene glycol 0.3% (SYSTANE ULTRA) 0.4-0.3 % SOLN ophthalmic solution     SALICYLIC ACID 40%, UREA 20% IN PETROLEUM, FV COMPOUNDED,    CREAM     UNABLE TO FIND     Urea 40 % CREA     COPPER PO     ezetimibe (ZETIA) 10 MG tablet     psyllium (METAMUCIL SMOOTH TEXTURE) 63 % POWD     RaNITidine HCl (ZANTAC PO)     No current facility-administered medications for this visit.     Allergies   Allergen Reactions     Gabapentin Nausea     Ibuprofen Sodium GI Disturbance     Simvastatin Cramps     Other reaction(s): Other  Groin and leg cramps               Review of Systems         Objective           Vitals:  No vitals were obtained today due to virtual visit.    Physical Exam   healthy, alert and no distress  PSYCH: Alert and oriented times 3; coherent speech, normal   rate and volume, able to articulate logical thoughts, able   to abstract reason, no tangential thoughts, no hallucinations   or delusions  Her affect is normal  RESP: No cough, no audible wheezing, able to talk in full sentences  Remainder of exam unable to be completed due to telephone visits            Phone call duration: 21 minutes

## 2022-09-02 DIAGNOSIS — I10 BENIGN ESSENTIAL HYPERTENSION: ICD-10-CM

## 2022-09-02 DIAGNOSIS — F32.89 OTHER DEPRESSION: ICD-10-CM

## 2022-09-07 RX ORDER — CITALOPRAM HYDROBROMIDE 20 MG/1
20 TABLET ORAL DAILY
Qty: 90 TABLET | Refills: 0 | Status: SHIPPED | OUTPATIENT
Start: 2022-09-07 | End: 2022-12-07

## 2022-09-07 NOTE — TELEPHONE ENCOUNTER
LOSARTAN 100MG TABLET    And     METOPROLOL SUCC 50MG ER TABLET  Last Written Prescription Date:  8-25-21  Last Fill Quantity: 90,   # refills: 3  Last Office Visit : 5-2-22  Future Office visit:  none    Last clinic note: My RN will call her next week, see home BP log, make appt    Routing refill request to provider for review/approval because:  Overdue lab: Cr, K and BP check  See last clinic note:  F/up BP      citalopram (CELEXA) 20 MG tablet      Last Written Prescription Date:  8-25-21  Last Fill Quantity: 90,   # refills: 3  Overdue PHQ9, FYI to clinic  RF 90 day

## 2022-09-08 RX ORDER — LOSARTAN POTASSIUM 100 MG/1
100 TABLET ORAL DAILY
Qty: 90 TABLET | Refills: 0 | Status: SHIPPED | OUTPATIENT
Start: 2022-09-08 | End: 2022-12-07

## 2022-09-08 RX ORDER — METOPROLOL SUCCINATE 50 MG/1
50 TABLET, EXTENDED RELEASE ORAL DAILY
Qty: 90 TABLET | Refills: 0 | Status: SHIPPED | OUTPATIENT
Start: 2022-09-08 | End: 2022-12-07

## 2022-12-05 DIAGNOSIS — F32.89 OTHER DEPRESSION: ICD-10-CM

## 2022-12-05 DIAGNOSIS — I10 BENIGN ESSENTIAL HYPERTENSION: ICD-10-CM

## 2022-12-07 RX ORDER — LOSARTAN POTASSIUM 100 MG/1
TABLET ORAL
Qty: 90 TABLET | Refills: 0 | Status: SHIPPED | OUTPATIENT
Start: 2022-12-07 | End: 2023-02-22

## 2022-12-07 RX ORDER — METOPROLOL SUCCINATE 50 MG/1
TABLET, EXTENDED RELEASE ORAL
Qty: 90 TABLET | Refills: 0 | Status: SHIPPED | OUTPATIENT
Start: 2022-12-07 | End: 2023-02-22

## 2022-12-07 RX ORDER — CITALOPRAM HYDROBROMIDE 20 MG/1
TABLET ORAL
Qty: 90 TABLET | Refills: 0 | Status: SHIPPED | OUTPATIENT
Start: 2022-12-07 | End: 2023-02-22

## 2022-12-07 NOTE — TELEPHONE ENCOUNTER
CITALOPRAM 20MG TABLET      Last Written Prescription Date:  9-7-22  Last Fill Quantity: 90,   # refills: 0  Last Office Visit : 5-2-22  Future Office visit:  none    Routing refill request to provider for review/approval because:  Overdue appt ,  PHQ9    Previous 90 day josh RF    METOPROLOL SUCC 50MG ER TABLET      Last Written Prescription Date:  9-8-22  Last Fill Quantity: 90,   # refills: 0  LOSARTAN 100MG TABLET      Last Written Prescription Date:  9-8-22  Last Fill Quantity: 90,   # refills: 0    Last clinic note:  Can do home BP, not doing, we agree she'll cont meds, do daily home BP,   my RN will call her in a week for numbers and to help make in person appt,   by then she should know when next it town    Routing refill request to provider for review/approval because:  Overdue BP check, appt, labs: Cr, K   previous 90 day josh

## 2023-02-19 DIAGNOSIS — F32.89 OTHER DEPRESSION: ICD-10-CM

## 2023-02-19 DIAGNOSIS — I10 BENIGN ESSENTIAL HYPERTENSION: ICD-10-CM

## 2023-02-22 RX ORDER — LOSARTAN POTASSIUM 100 MG/1
100 TABLET ORAL DAILY
Qty: 30 TABLET | Refills: 0 | Status: SHIPPED | OUTPATIENT
Start: 2023-02-22 | End: 2023-03-15

## 2023-02-22 RX ORDER — CITALOPRAM HYDROBROMIDE 20 MG/1
20 TABLET ORAL DAILY
Qty: 30 TABLET | Refills: 0 | Status: SHIPPED | OUTPATIENT
Start: 2023-02-22 | End: 2023-03-15

## 2023-02-22 RX ORDER — METOPROLOL SUCCINATE 50 MG/1
50 TABLET, EXTENDED RELEASE ORAL DAILY
Qty: 30 TABLET | Refills: 0 | Status: SHIPPED | OUTPATIENT
Start: 2023-02-22 | End: 2023-03-15

## 2023-02-22 NOTE — TELEPHONE ENCOUNTER
CITALOPRAM 20MG TABLET      Last Written Prescription Date:  12/7/22  Last Fill Quantity: 90,   # refills: 0    Routing refill request to provider for review/approval because:  Overdue follow up visit  Overdue PHQ9    METOPROLOL SUCC 50MG ER TABLET      Last Written Prescription Date:  12/7/22  Last Fill Quantity: 90,   # refills: 0    Routing refill request to provider for review/approval because:  Elevated Bp    LOSARTAN 100MG TABLET      Last Written Prescription Date:  12/7/22  Last Fill Quantity: 90,   # refills: 0  Last Office Visit : 5/2/22  Future Office visit:  none    Routing refill request to provider for review/approval because:  Overdue labs  Creatinine   Date Value Ref Range Status   08/25/2021 0.80 0.52 - 1.04 mg/dL Final   06/06/2019 0.69 0.52 - 1.04 mg/dL Final     Potassium   Date Value Ref Range Status   08/25/2021 4.4 3.4 - 5.3 mmol/L Final   06/06/2019 4.1 3.4 - 5.3 mmol/L Final

## 2023-03-15 ENCOUNTER — TELEPHONE (OUTPATIENT)
Dept: FAMILY MEDICINE | Facility: CLINIC | Age: 83
End: 2023-03-15
Payer: COMMERCIAL

## 2023-03-15 DIAGNOSIS — I10 BENIGN ESSENTIAL HYPERTENSION: ICD-10-CM

## 2023-03-15 DIAGNOSIS — F32.89 OTHER DEPRESSION: ICD-10-CM

## 2023-03-15 RX ORDER — CITALOPRAM HYDROBROMIDE 20 MG/1
20 TABLET ORAL DAILY
Qty: 60 TABLET | Refills: 0 | Status: SHIPPED | OUTPATIENT
Start: 2023-03-15 | End: 2023-05-12

## 2023-03-15 RX ORDER — LOSARTAN POTASSIUM 100 MG/1
100 TABLET ORAL DAILY
Qty: 60 TABLET | Refills: 0 | Status: SHIPPED | OUTPATIENT
Start: 2023-03-15 | End: 2023-05-03

## 2023-03-15 RX ORDER — METOPROLOL SUCCINATE 50 MG/1
50 TABLET, EXTENDED RELEASE ORAL DAILY
Qty: 60 TABLET | Refills: 0 | Status: SHIPPED | OUTPATIENT
Start: 2023-03-15 | End: 2023-05-03

## 2023-03-15 NOTE — TELEPHONE ENCOUNTER
metoprolol succinate ER (TOPROL XL) 50 MG 24 hr tablet  Last Written Prescription Date:  2/22/23  Last Fill Quantity: 30,   # refills: 0  losartan (COZAAR) 100 MG tablet  Last Written Prescription Date:  2/22/23  Last Fill Quantity: 30,   # refills: 0  citalopram (CELEXA) 20 MG tablet  Last Written Prescription Date:  2/22/23   Last Fill Quantity: 30,   # refills: 0   Last Office Visit : 5/2/22  Future Office visit:  5/12/23    Labs, PHQ9 and BP due- has upcoming appt 5/12/23,   60 day josh refill to next appt.

## 2023-03-15 NOTE — TELEPHONE ENCOUNTER
/[facility] Call Center    Phone Message    May a detailed message be left on voicemail: yes     Reason for Call: Medication Refill Request    Has the patient contacted the pharmacy for the refill? Yes   Name of medication being requested: metoprolol succinate ER (TOPROL XL) 50 MG 24 hr tablet  losartan (COZAAR) 100 MG tablet  citalopram (CELEXA) 20 MG tablet  Provider who prescribed the medication: PCP  Pharmacy: [facility] [address] [place] [address] (Ph: [phone])  Date medication is needed: ASAP, patient only has a few          Action Taken: Message routed to:  [facility] PCP    Travel Screening: Not Applicable

## 2023-05-03 RX ORDER — METOPROLOL SUCCINATE 50 MG/1
50 TABLET, EXTENDED RELEASE ORAL DAILY
Qty: 30 TABLET | Refills: 0 | Status: SHIPPED | OUTPATIENT
Start: 2023-05-03 | End: 2023-05-12

## 2023-05-03 RX ORDER — LOSARTAN POTASSIUM 100 MG/1
100 TABLET ORAL DAILY
Qty: 30 TABLET | Refills: 0 | Status: SHIPPED | OUTPATIENT
Start: 2023-05-03 | End: 2023-05-12

## 2023-05-03 NOTE — TELEPHONE ENCOUNTER
Per outside medication records, pt received a 30 day supply on 3/13 of both medications.     Pt was last seen in clinic on 5/2/22- upcoming appt scheduled 5/12/23. Pt last had metoprolol succinate ER (TOPROL XL) 50 MG 24 hr tablet and losartan (COZAAR) 100 MG tablet refilled on 3/13/23 for a 30 day supply by PCP. Due for refill 4/12/23. Medication refill sent to pharmacy for 30 day supply to get pt to appt with Dr. Mueller.     Pt overdue for labs, CMP ordered.   Pt called to notify.     CLIFF MERRITT RN on 5/3/2023 at 1:14 PM

## 2023-05-03 NOTE — TELEPHONE ENCOUNTER
Patient is out of her 2 blood pressure meds tonight, and still has not received her refill. She has her physical scheduled for next week and is unable to come in any sooner to have these medications refilled. Patient is requesting a phone call back.

## 2023-05-12 ENCOUNTER — LAB (OUTPATIENT)
Dept: LAB | Facility: CLINIC | Age: 83
End: 2023-05-12
Payer: COMMERCIAL

## 2023-05-12 ENCOUNTER — ANCILLARY PROCEDURE (OUTPATIENT)
Dept: GENERAL RADIOLOGY | Facility: CLINIC | Age: 83
End: 2023-05-12
Attending: FAMILY MEDICINE
Payer: COMMERCIAL

## 2023-05-12 ENCOUNTER — OFFICE VISIT (OUTPATIENT)
Dept: FAMILY MEDICINE | Facility: CLINIC | Age: 83
End: 2023-05-12
Payer: COMMERCIAL

## 2023-05-12 VITALS
HEART RATE: 72 BPM | WEIGHT: 191.8 LBS | SYSTOLIC BLOOD PRESSURE: 143 MMHG | DIASTOLIC BLOOD PRESSURE: 80 MMHG | HEIGHT: 61 IN | BODY MASS INDEX: 36.21 KG/M2 | OXYGEN SATURATION: 93 %

## 2023-05-12 DIAGNOSIS — T17.308A CHOKING, INITIAL ENCOUNTER: ICD-10-CM

## 2023-05-12 DIAGNOSIS — E78.5 HYPERLIPIDEMIA WITH TARGET LDL LESS THAN 100: ICD-10-CM

## 2023-05-12 DIAGNOSIS — R63.4 WEIGHT LOSS: ICD-10-CM

## 2023-05-12 DIAGNOSIS — F32.89 OTHER DEPRESSION: ICD-10-CM

## 2023-05-12 DIAGNOSIS — R25.1 TREMOR: ICD-10-CM

## 2023-05-12 DIAGNOSIS — G47.33 OSA (OBSTRUCTIVE SLEEP APNEA): Primary | ICD-10-CM

## 2023-05-12 DIAGNOSIS — I10 BENIGN ESSENTIAL HYPERTENSION: ICD-10-CM

## 2023-05-12 DIAGNOSIS — R05.3 CHRONIC COUGH: ICD-10-CM

## 2023-05-12 DIAGNOSIS — R60.9 EDEMA, UNSPECIFIED TYPE: ICD-10-CM

## 2023-05-12 LAB
ALBUMIN SERPL BCG-MCNC: 4.3 G/DL (ref 3.5–5.2)
ALP SERPL-CCNC: 66 U/L (ref 35–104)
ALT SERPL W P-5'-P-CCNC: 15 U/L (ref 10–35)
ANION GAP SERPL CALCULATED.3IONS-SCNC: 9 MMOL/L (ref 7–15)
AST SERPL W P-5'-P-CCNC: 16 U/L (ref 10–35)
BASOPHILS # BLD MANUAL: 0 10E3/UL (ref 0–0.2)
BASOPHILS NFR BLD MANUAL: 0 %
BILIRUB SERPL-MCNC: 0.6 MG/DL
BUN SERPL-MCNC: 25.6 MG/DL (ref 8–23)
CALCIUM SERPL-MCNC: 10 MG/DL (ref 8.8–10.2)
CHLORIDE SERPL-SCNC: 101 MMOL/L (ref 98–107)
CHOLEST SERPL-MCNC: 253 MG/DL
CORTIS SERPL-MCNC: 10.2 UG/DL
CREAT SERPL-MCNC: 0.92 MG/DL (ref 0.51–0.95)
DEPRECATED HCO3 PLAS-SCNC: 29 MMOL/L (ref 22–29)
EOSINOPHIL # BLD MANUAL: 0 10E3/UL (ref 0–0.7)
EOSINOPHIL NFR BLD MANUAL: 0 %
ERYTHROCYTE [DISTWIDTH] IN BLOOD BY AUTOMATED COUNT: 12.4 % (ref 10–15)
GFR SERPL CREATININE-BSD FRML MDRD: 63 ML/MIN/1.73M2
GLUCOSE SERPL-MCNC: 125 MG/DL (ref 70–99)
HCT VFR BLD AUTO: 45.3 % (ref 35–47)
HDLC SERPL-MCNC: 39 MG/DL
HGB BLD-MCNC: 15.1 G/DL (ref 11.7–15.7)
LDLC SERPL CALC-MCNC: 166 MG/DL
LYMPHOCYTES # BLD MANUAL: 2.2 10E3/UL (ref 0.8–5.3)
LYMPHOCYTES NFR BLD MANUAL: 25 %
MCH RBC QN AUTO: 29.6 PG (ref 26.5–33)
MCHC RBC AUTO-ENTMCNC: 33.3 G/DL (ref 31.5–36.5)
MCV RBC AUTO: 89 FL (ref 78–100)
MONOCYTES # BLD MANUAL: 0.4 10E3/UL (ref 0–1.3)
MONOCYTES NFR BLD MANUAL: 5 %
NEUTROPHILS # BLD MANUAL: 6.1 10E3/UL (ref 1.6–8.3)
NEUTROPHILS NFR BLD MANUAL: 70 %
NONHDLC SERPL-MCNC: 214 MG/DL
PLAT MORPH BLD: NORMAL
PLATELET # BLD AUTO: 284 10E3/UL (ref 150–450)
POTASSIUM SERPL-SCNC: 4.6 MMOL/L (ref 3.4–5.3)
PROT SERPL-MCNC: 7.5 G/DL (ref 6.4–8.3)
RBC # BLD AUTO: 5.1 10E6/UL (ref 3.8–5.2)
RBC MORPH BLD: NORMAL
SODIUM SERPL-SCNC: 139 MMOL/L (ref 136–145)
TRIGL SERPL-MCNC: 238 MG/DL
TSH SERPL DL<=0.005 MIU/L-ACNC: 2.79 UIU/ML (ref 0.3–4.2)
WBC # BLD AUTO: 8.7 10E3/UL (ref 4–11)

## 2023-05-12 PROCEDURE — 36415 COLL VENOUS BLD VENIPUNCTURE: CPT | Performed by: PATHOLOGY

## 2023-05-12 PROCEDURE — 84443 ASSAY THYROID STIM HORMONE: CPT | Performed by: PATHOLOGY

## 2023-05-12 PROCEDURE — 85025 COMPLETE CBC W/AUTO DIFF WBC: CPT | Performed by: PATHOLOGY

## 2023-05-12 PROCEDURE — 80053 COMPREHEN METABOLIC PANEL: CPT | Performed by: PATHOLOGY

## 2023-05-12 PROCEDURE — 71046 X-RAY EXAM CHEST 2 VIEWS: CPT | Mod: GC | Performed by: RADIOLOGY

## 2023-05-12 PROCEDURE — 99000 SPECIMEN HANDLING OFFICE-LAB: CPT | Performed by: PATHOLOGY

## 2023-05-12 PROCEDURE — 99397 PER PM REEVAL EST PAT 65+ YR: CPT | Performed by: FAMILY MEDICINE

## 2023-05-12 PROCEDURE — 80061 LIPID PANEL: CPT | Performed by: PATHOLOGY

## 2023-05-12 PROCEDURE — 82533 TOTAL CORTISOL: CPT | Mod: 90 | Performed by: PATHOLOGY

## 2023-05-12 RX ORDER — LOSARTAN POTASSIUM 100 MG/1
100 TABLET ORAL DAILY
Qty: 90 TABLET | Refills: 3 | Status: SHIPPED | OUTPATIENT
Start: 2023-05-12 | End: 2024-05-24

## 2023-05-12 RX ORDER — METOPROLOL SUCCINATE 50 MG/1
50 TABLET, EXTENDED RELEASE ORAL DAILY
Qty: 90 TABLET | Refills: 3 | Status: SHIPPED | OUTPATIENT
Start: 2023-05-12 | End: 2024-05-24

## 2023-05-12 RX ORDER — CITALOPRAM HYDROBROMIDE 20 MG/1
20 TABLET ORAL DAILY
Qty: 90 TABLET | Refills: 3 | Status: SHIPPED | OUTPATIENT
Start: 2023-05-12 | End: 2024-05-24

## 2023-05-12 RX ORDER — HYDROCHLOROTHIAZIDE 25 MG/1
25 TABLET ORAL DAILY
Qty: 90 TABLET | Refills: 3 | Status: SHIPPED | OUTPATIENT
Start: 2023-05-12 | End: 2024-05-24

## 2023-05-12 NOTE — PROGRESS NOTES
"  Assessment & Plan     MARCELO (obstructive sleep apnea)  F/u sudheer needs new device/settings  - Adult Sleep Eval & Management  Referral; Future    Tremor: monitor discussed nature essential tremor    Chronic cough    - XR Chest 2 Views; Future    Choking, initial encounter    - XR Video Swallow with SLP or OT - Order with Speech Therapy Referral; Future  - Speech Therapy Referral; Future    Weight loss    - CBC with platelets and differential; Future  - TSH with free T4 reflex; Future  - Comprehensive metabolic panel (BMP + Alb, Alk Phos, ALT, AST, Total. Bili, TP); Future  - Cortisol; Future    Hyperlipidemia with target LDL less than 100    - Lipid panel reflex to direct LDL Fasting; Future    Other depression    - citalopram (CELEXA) 20 MG tablet; Take 1 tablet (20 mg) by mouth daily    Edema, unspecified type    - hydrochlorothiazide (HYDRODIURIL) 25 MG tablet; Take 1 tablet (25 mg) by mouth daily    Benign essential hypertension    - losartan (COZAAR) 100 MG tablet; Take 1 tablet (100 mg) by mouth daily  - metoprolol succinate ER (TOPROL XL) 50 MG 24 hr tablet; Take 1 tablet (50 mg) by mouth daily      65 minutes spent by me on the date of the encounter doing chart review, history and exam, documentation and further activities per the note    BMI:   Estimated body mass index is 35.95 kg/m  as calculated from the following:    Height as of this encounter: 1.556 m (5' 1.25\").    Weight as of this encounter: 87 kg (191 lb 12.8 oz).     No follow-ups on file.    Babatunde Mueller MD  [facility] [place]    Conor Liang is a 82 year old, presenting for the following health issues:  Physical (Pt would like to discuss life screening form; pt would like to follow up on dental infection; recently stopped using CPAP machine) and Hypertension    HPI   1-here w/ dtr  2-[place], could not really get out in winter. Ok for now, discussed getting life " alert necklace, discussed getting senior apt at some point, think about it  3-in past dx MARCELO, quit using cpap. Discussed importance of MARCELO rx.  4-htn; off diuretic, takes others, will resume that med and fresh sleep visit  5-did life screen, they did labs but not back yet, they did BP hi, is at home too  6-now ok, had ear pain post dental work  7-due for extra covid shot  8-has fallen but always accident due to tripping on her dog  9-celexa helps mood and tolerated  Normal 3 word recall and clock draw  10-several episoes choking on clears, more often  11-less gerd, off PPI, just prn tums, doing well  12-lost about ten lbs not sure why, eats well no GI sx    Wakes up coughing  Chronic both arms slowly worse tremor w/ eat/write noted, cannot recall if in family  Past Medical History:   Diagnosis Date     Cataracts, bilateral      Dyslipidemia      HTN (hypertension)      Statin intolerance 10/22/2015     No past surgical history on file.  Current Outpatient Medications   Medication     aspirin 81 MG tablet     Calcium Citrate-Vitamin D (CITRACAL + D PO)     Cetirizine HCl (ZYRTEC PO)     citalopram (CELEXA) 20 MG tablet     citalopram (CELEXA) 20 MG tablet     hydrochlorothiazide (HYDRODIURIL) 25 MG tablet     losartan (COZAAR) 100 MG tablet     metoprolol succinate ER (TOPROL XL) 50 MG 24 hr tablet     COPPER PO     cyabnocobalamin (VITAMIN B-12) 2500 MCG sublingual tablet     econazole nitrate 1 % cream     ezetimibe (ZETIA) 10 MG tablet     minoxidil (ROGAINE) 2 % external solution     Multiple Vitamins-Minerals (CENTRUM SILVER PO)     Omega-3 Fatty Acids (OMEGA 3 PO)     omeprazole (PRILOSEC) 20 MG CR capsule     omeprazole (PRILOSEC) 20 MG DR capsule     order for DME     ORDER FOR DME     polyethylene glycol 0.4%- propylene glycol 0.3% (SYSTANE ULTRA) 0.4-0.3 % SOLN ophthalmic solution     psyllium (METAMUCIL SMOOTH TEXTURE) 63 % POWD     RaNITidine HCl (ZANTAC PO)     SALICYLIC ACID 40%, UREA 20% IN PETROLEUM,  "FV COMPOUNDED,    CREAM     UNABLE TO FIND     Urea 40 % CREA     No current facility-administered medications for this visit.     Allergies   Allergen Reactions     Gabapentin Nausea     Ibuprofen Sodium GI Disturbance     Simvastatin Cramps     Other reaction(s): Other  Groin and leg cramps     No family history on file.  Social History     Socioeconomic History     Marital status:      Spouse name: Not on file     Number of children: Not on file     Years of education: Not on file     Highest education level: Not on file   Occupational History     Not on file   Tobacco Use     Smoking status: Never     Smokeless tobacco: Never   Vaping Use     Vaping status: Not on file   Substance and Sexual Activity     Alcohol use: Yes     Alcohol/week: 0.8 standard drinks of alcohol     Types: 1 Glasses of wine per week     Drug use: Not on file     Sexual activity: Not on file   Other Topics Concern     Parent/sibling w/ CABG, MI or angioplasty before 65F 55M? Not Asked   Social History Narrative     Not on file     Social Determinants of Health     Financial Resource Strain: Not on file   Food Insecurity: Not on file   Transportation Needs: Not on file   Physical Activity: Not on file   Stress: Not on file   Social Connections: Not on file   Intimate Partner Violence: Not on file   Housing Stability: Not on file         Review of Systems   Ten pt ROS o/w negative      Objective    BP (!) 143/80 (BP Location: Right arm, Patient Position: Sitting, Cuff Size: Adult Large)   Pulse 72   Ht 1.556 m (5' 1.25\")   Wt 87 kg (191 lb 12.8 oz)   SpO2 93%   BMI 35.95 kg/m    Body mass index is 35.95 kg/m .  Physical Exam   GENERAL: healthy, alert and no distress  EYES: Eyes grossly normal to inspection, PERRL and conjunctivae and sclerae normal  HENT: ear canals and TM's normal, nose and mouth without ulcers or lesions  NECK: no adenopathy, no asymmetry, masses, or scars and thyroid normal to palpation  RESP: lungs clear to " auscultation - no rales, rhonchi or wheezes  CV: regular rate and rhythm, normal S1 S2, no S3 or S4, no murmur, click or rub, no peripheral edema and peripheral pulses strong  ABDOMEN: soft, nontender, no hepatosplenomegaly, no masses and bowel sounds normal  MS: no gross musculoskeletal defects noted, no edema  SKIN: no suspicious lesions or rashes  NEURO: Normal strength and tone, mentation intact and speech normal, mild essential tremor in hands no signs PD  PSYCH: mentation appears normal, affect normal/bright

## 2023-05-17 ENCOUNTER — TELEPHONE (OUTPATIENT)
Dept: CARE COORDINATION | Facility: CLINIC | Age: 83
End: 2023-05-17

## 2023-05-17 NOTE — TELEPHONE ENCOUNTER
Left a detailed message to the pt regarding the results and recommendations. She needs to call me back.    Soon-Mi  --------------------------------------------------------------------------            ----- Message from Babatunde Mueller MD sent at 5/16/2023  1:02 PM CDT -----  Can you let her know labs and chest xray fine    Cholesterol hi    In past I think had side effects w/ zocor    If she'd be willing could try pravachol 20 mg/day ok 90 three refills as another option    If she is hestitant to do that, she could also visit w/ cardiology to see if candidate for newest cholesterol meds, totally different (repatha and praluent), I'd want their input    Offer pravachol, or if she is not wishing to do that offer cardiology visit for hi chol

## 2023-05-31 NOTE — TELEPHONE ENCOUNTER
Left another detailed message regarding the result and recommendations and call me back with her opinion.

## 2023-09-28 ENCOUNTER — OFFICE VISIT (OUTPATIENT)
Dept: SLEEP MEDICINE | Facility: CLINIC | Age: 83
End: 2023-09-28
Attending: FAMILY MEDICINE
Payer: COMMERCIAL

## 2023-09-28 VITALS
HEART RATE: 70 BPM | RESPIRATION RATE: 16 BRPM | BODY MASS INDEX: 34.89 KG/M2 | DIASTOLIC BLOOD PRESSURE: 76 MMHG | HEIGHT: 62 IN | WEIGHT: 189.6 LBS | OXYGEN SATURATION: 95 % | SYSTOLIC BLOOD PRESSURE: 137 MMHG

## 2023-09-28 DIAGNOSIS — G47.33 OSA (OBSTRUCTIVE SLEEP APNEA): Primary | ICD-10-CM

## 2023-09-28 PROCEDURE — 99204 OFFICE O/P NEW MOD 45 MIN: CPT | Performed by: PHYSICIAN ASSISTANT

## 2023-09-28 ASSESSMENT — SLEEP AND FATIGUE QUESTIONNAIRES
HOW LIKELY ARE YOU TO NOD OFF OR FALL ASLEEP WHILE SITTING QUIETLY AFTER LUNCH WITHOUT ALCOHOL: WOULD NEVER DOZE
HOW LIKELY ARE YOU TO NOD OFF OR FALL ASLEEP WHILE LYING DOWN TO REST IN THE AFTERNOON WHEN CIRCUMSTANCES PERMIT: WOULD NEVER DOZE
HOW LIKELY ARE YOU TO NOD OFF OR FALL ASLEEP IN A CAR, WHILE STOPPED FOR A FEW MINUTES IN TRAFFIC: WOULD NEVER DOZE
HOW LIKELY ARE YOU TO NOD OFF OR FALL ASLEEP WHEN YOU ARE A PASSENGER IN A CAR FOR AN HOUR WITHOUT A BREAK: SLIGHT CHANCE OF DOZING
HOW LIKELY ARE YOU TO NOD OFF OR FALL ASLEEP WHILE WATCHING TV: HIGH CHANCE OF DOZING
HOW LIKELY ARE YOU TO NOD OFF OR FALL ASLEEP WHILE SITTING AND READING: HIGH CHANCE OF DOZING
HOW LIKELY ARE YOU TO NOD OFF OR FALL ASLEEP WHILE SITTING AND TALKING TO SOMEONE: WOULD NEVER DOZE
HOW LIKELY ARE YOU TO NOD OFF OR FALL ASLEEP WHILE SITTING INACTIVE IN A PUBLIC PLACE: MODERATE CHANCE OF DOZING

## 2023-09-28 NOTE — NURSING NOTE
DME orders and supporting documents have been faxed to [facility] @ [phone]. 3 month new CPAP compliance visit scheduled. AVS printed and given to patient.  Kellen Mclaughlin CMA

## 2023-09-28 NOTE — NURSING NOTE
"Chief Complaint   Patient presents with    CPAP Follow Up     Has a CPAP. Has not used in over a year due to the machine being recalled.         Initial BP (!) 150/79   Pulse 70   Resp 16   Ht 1.562 m (5' 1.5\")   Wt 86 kg (189 lb 9.6 oz)   SpO2 95%   BMI 35.24 kg/m   Estimated body mass index is 35.24 kg/m  as calculated from the following:    Height as of this encounter: 1.562 m (5' 1.5\").    Weight as of this encounter: 86 kg (189 lb 9.6 oz).    Medication Reconciliation: complete  ESS: 9  Neck circumference: 15.75 inches / 40 centimeters.  DME: [employer]. (P) [phone], (F) [phone]  Kellen Mclaughlin CMA    "

## 2023-09-28 NOTE — PROGRESS NOTES
Outpatient Sleep Medicine Consultation:      Name: Jessica Loaiza MRN# [id number]   Age: 82 year old YOB: 1940     Date of Consultation: September 28, 2023  Consultation is requested by: Babatunde Mueller MD  [address] [place] [id number] Babatunde Mueller  Primary care provider: Babatunde Mueller       Reason for Sleep Consult:     Jessica Loaiza is sent by Babatunde Mueller for a sleep consultation regarding MARCELO.    Patient s Reason for visit  Jessica Loaiza main reason for visit: bad machine  Patient states problem(s) started: 2012  Jessica Loaiza's goals for this visit: better sleep and new machine           Assessment and Plan:     Summary Sleep Diagnoses & Recommendations:   Severe obstructive sleep apnea. Tolerating PAP well. Daytime symptoms are improved with use of CPAP.   She is unable to use her CPAP because her machine is recalled.   She needs a new CPAP.  Comprehensive DME order placed.     Delayed sleep phase: We discussed incrementally advancing wake up times.     Jessica Loaiza will follow up in about 3 month(s).     Comorbid Diagnoses:  HTN  Obesity      Summary Recommendations:  Orders Placed This Encounter   Procedures    Comprehensive DME     Summary Counseling:      Obstructive Sleep Apnea Reviewed  Complications of Untreated Sleep Apnea Reviewed      Medical Decision-making:   Educational materials provided in instructions    Total time spent reviewing medical records, history and physical examination, review of previous testing and interpretation as well as documentation on this date:50 minutes    CC: Babatunde Mueller          History of Present Illness:     Past Sleep Evaluations:    Jessica Loaiza is an 82 year old with history of severe sleep apnea. She was last seen in 2016.     She initially presented with loud snoring with associated snort arousals for many years, non restorative sleep, dry mouth in the morning and acid reflux at  night.    Polysomnogram completed 1/15/14 (186#)-AHI 32, RDI 35, lowest oxygen saturation was 77, Optimum CPAP not achieved. Patient sent home on auto-CPAP 5-15 cm/H20.   CPAP DME:  [facility]    Do you use a CPAP Machine at home:  yes  Overall, on a scale of 0-10 how would you rate your CPAP (0 poor, 10 great):      What type of mask do you use:  nasal pillow  Is your mask comfortable:    If not, why:      Respironics  Auto-PAP 5 - 15 cmH2O 30 day usage data: 2/4/2022-3/5/2022. She has not been using because of machine recall.    97% of days with > 4 hours of use. 0/30 days with no use.   Average use 8 hours and 7 minutes per day.   Average large leak 1 minute   CPAP 90% pressure 11 cm.   AHI 3 events per hour.    SLEEP-WAKE SCHEDULE:     Work/School Days: Patient goes to school/work: No   Usually gets into bed at 2:00  Takes patient about 2-4hours to fall asleep  Has trouble falling asleep everynight nights per week  Wakes up in the middle of the night just to use the bathroom 3times per night times.  Wakes up due to Use the bathroom;Nightmares  She has trouble falling back asleep   times a week.   It usually takes   to get back to sleep  Patient is usually up at 11:00/12:00  Uses alarm: Yes    Weekends/Non-work Days/All Other Days:  Usually gets into bed at same as week days   Takes patient about same to fall asleep  Patient is usually up at same as week days  Uses alarm: Yes    Sleep Need  Patient gets  8 hours per night or more sleep on average   Patient thinks she needs about 9 hours sleep    Jessicameng Loaiza prefers to sleep in this position(s): Side;Head Elevated   Patient states they do the following activities in bed:      Naps  Patient takes a purposeful nap   times a week and naps are usually ? in duration  She feels better after a nap: No  She dozes off unintentionally almost everyday days per week  Patient has had a driving accident or near-miss due to sleepiness/drowsiness: No      SLEEP  DISRUPTIONS:    Breathing/Snoring  Patient snores:Yes  Other people complain about her snoring: Yes  Patient has been told she stops breathing in her sleep:Yes  She has issues with the following: Morning mouth dryness;Heartburn or reflux at night;Getting up to urinate more than once    Movement:  Patient gets pain, discomfort, with an urge to move:  No  It happens when she is resting:  No  It happens more at night:  Yes  Patient has been told she kicks her legs at night:  Yes     Behaviors in Sleep:  Jessica Loaiza has experienced the following behaviors while sleeping: Teeth grinding;See or hear things that are not really there upon awakening or just falling asleep  She has experienced sudden muscle weakness during the day:        Is there anything else you would like your sleep provider to know:        CAFFEINE AND OTHER SUBSTANCES:    Patient consumes caffeinated beverages per day:  1-2 cups per day  Last caffeine use is usually: before 2pm  List of any prescribed or over the counter stimulants that patient takes: none  List of any prescribed or over the counter sleep medication patient takes: none  List of previous sleep medications that patient has tried: melatonin  Patient drinks alcohol to help them sleep: No  Patient drinks alcohol near bedtime: No    Family History:  Patient has a family member been diagnosed with a sleep disorder: Yes  siblings         SCALES:    EPWORTH SLEEPINESS SCALE         9/28/2023     1:18 PM    [place] Sleepiness Scale ( PELON Chacon  [id number]<br>ESS - [place]/English - Final version - 21 Nov 07 - [facility] [place].)   Sitting and reading High chance of dozing   Watching TV High chance of dozing   Sitting, inactive in a public place (e.g. a theatre or a meeting) Moderate chance of dozing   As a passenger in a car for an hour without a break Slight chance of dozing   Lying down to rest in the afternoon when circumstances permit Would never doze   Sitting and talking to someone  Would never doze   Sitting quietly after a lunch without alcohol Would never doze   In a car, while stopped for a few minutes in traffic Would never doze   McIntosh Score (MC) 9   McIntosh Score (Sleep) 9         INSOMNIA SEVERITY INDEX (TITO)          9/28/2023     1:18 PM   Insomnia Severity Index (TITO)   Difficulty falling asleep 3   Difficulty staying asleep 0   Problems waking up too early 0   How SATISFIED/DISSATISFIED are you with your CURRENT sleep pattern? 3   How NOTICEABLE to others do you think your sleep problem is in terms of impairing the quality of your life? 2   How WORRIED/DISTRESSED are you about your current sleep problem? 2   To what extent do you consider your sleep problem to INTERFERE with your daily functioning (e.g. daytime fatigue, mood, ability to function at work/daily chores, concentration, memory, mood, etc.) CURRENTLY? 3   TITO Total Score 13       Guidelines for Scoring/Interpretation:  Total score categories:  0-7 = No clinically significant insomnia   8-14 = Subthreshold insomnia   15-21 = Clinical insomnia (moderate severity)  22-28 = Clinical insomnia (severe)  Used via courtesy of www.ClassifEyeth.va.gov with permission from Alfredo Pacheco PhD., [facility]      STOP BANG         9/28/2023     2:25 PM   STOP BANG Questionnaire (  2008, the American Society of Anesthesiologists, Inc. [employer]   [facility]: 137/76         GAD7        6/6/2019     2:01 PM   ZELDA-7    1. Feeling nervous, anxious, or on edge 1   2. Not being able to stop or control worrying 1   3. Worrying too much about different things 1   4. Trouble relaxing 0   5. Being so restless that it is hard to sit still 0   6. Becoming easily annoyed or irritable 0   7. Feeling afraid, as if something awful might happen 0   ZELDA-7 Total Score 3   If you checked any problems, how difficult have they made it for you to do your work, take care of things at home, or get along with other people?  "Somewhat difficult         CAGE-AID         No data to display                CAGE-AID reprinted with permission from the [place] Medical Journal, ELINA Velazco. and NOAH Stovall, \"Conjoint screening questionnaires for alcohol and drug abuse\" [place] Medical Journal 94: 135-140, 1995.      PATIENT HEALTH QUESTIONNAIRE-9 (PHQ - 9)        6/6/2019     2:01 PM   PHQ-9 ([employer])   1.  Little interest or pleasure in doing things 0   2.  Feeling down, depressed, or hopeless 0   3.  Trouble falling or staying asleep, or sleeping too much 2   4.  Feeling tired or having little energy 1   5.  Poor appetite or overeating 1   6.  Feeling bad about yourself - or that you are a failure or have let yourself or your family down 0   7.  Trouble concentrating on things, such as reading the newspaper or watching television 0   8.  Moving or speaking so slowly that other people could have noticed. Or the opposite - being so fidgety or restless that you have been moving around a lot more than usual 0   9.  Thoughts that you would be better off dead, or of hurting yourself in some way 0   PHQ-9 Total Score 4   If you checked off any problems, how difficult have these problems made it for you to do your work, take care of things at home, or get along with other people? Somewhat difficult   6.  Feeling bad about yourself 0   7.  Trouble concentrating 0   8.  Moving slowly or restless 0   9.  Suicidal or self-harm thoughts 0   Difficulty at work, home, or with people Somewhat difficult       Developed by Rahul Kinney, Beatris Milligan, Tyree Frederick and colleagues, with an educational yun from [employer] No permission required to reproduce, translate, display or distribute.        Allergies:    Allergies   Allergen Reactions    Gabapentin Nausea    Ibuprofen Sodium GI Disturbance    Simvastatin Cramps     Other reaction(s): Other  Groin and leg cramps       Medications:    Current Outpatient Medications   Medication Sig " Dispense Refill    aspirin 81 MG tablet Take 1 tablet by mouth daily.      Calcium Citrate-Vitamin D (CITRACAL + D PO) Take 1 tablet by mouth daily       citalopram (CELEXA) 20 MG tablet Take 1 tablet (20 mg) by mouth daily 90 tablet 3    citalopram (CELEXA) 20 MG tablet Take 1 tablet (20 mg) by mouth daily 30 tablet 0    COPPER PO Take 1 tablet by mouth as needed      cyabnocobalamin (VITAMIN B-12) 2500 MCG sublingual tablet Take 1 tablet by mouth daily Taking daily.      econazole nitrate 1 % cream Apply topically 2 times daily 85 g 5    hydrochlorothiazide (HYDRODIURIL) 25 MG tablet Take 1 tablet (25 mg) by mouth daily 90 tablet 3    losartan (COZAAR) 100 MG tablet Take 1 tablet (100 mg) by mouth daily 90 tablet 3    metoprolol succinate ER (TOPROL XL) 50 MG 24 hr tablet Take 1 tablet (50 mg) by mouth daily 90 tablet 3    minoxidil (ROGAINE) 2 % external solution Apply topically daily       Multiple Vitamins-Minerals (CENTRUM SILVER PO) Take  by mouth.      Omega-3 Fatty Acids (OMEGA 3 PO) Take 1 capsule by mouth daily       omeprazole (PRILOSEC) 20 MG CR capsule Take 1 tablet (20 mg) by mouth daily Take 30-60 minutes before a meal. (Patient taking differently: Take 1 tablet (20 mg) by mouth daily Take 30-60 minutes before a meal.    Taking every other day. Alternates with Zantac.) 90 capsule 1    omeprazole (PRILOSEC) 20 MG DR capsule Take 1 capsule (20 mg) by mouth daily 90 capsule 3    order for DME Equipment being ordered: Right wrist velcro brace with a thumb 1 Device 0    ORDER FOR DME Use your CPAP device as directed by your provider.      polyethylene glycol 0.4%- propylene glycol 0.3% (SYSTANE ULTRA) 0.4-0.3 % SOLN ophthalmic solution Place 1 drop into both eyes 2 times daily      psyllium (METAMUCIL SMOOTH TEXTURE) 63 % POWD Take 1 Tablespoonful by mouth 3 times daily Mix in 8 ounces of water 3 Bottle 3    RaNITidine HCl (ZANTAC PO) Alternates with Prilosec.      SALICYLIC ACID 40%, UREA 20% IN  PETROLEUM, FV COMPOUNDED,    CREAM Twice daily 120 g 3    UNABLE TO FIND daily MEDICATION NAME: Charles Support      Urea 40 % CREA Externally apply  topically. Apply to feet daily 60 g 3    ezetimibe (ZETIA) 10 MG tablet Take 1 tablet (10 mg) by mouth daily (Patient not taking: Reported on 9/28/2023) 90 tablet 3       Problem List:  Patient Active Problem List    Diagnosis Date Noted    Statin intolerance 10/22/2015     Priority: Medium    Hyperlipidemia with target LDL less than 100 06/05/2014     Priority: Medium     Diagnosis updated by automated process. Provider to review and confirm.      Obstructive sleep apnea 01/15/2014     Priority: Medium     Polysomnogram completed 1/15/14 (186#)-AHI 32, RDI 35, lowest oxygen saturation was 77, Optimum CPAP not achieved. Patient sent home on auto-CPAP 5-15 cm/H20      Disturbance in sleep behavior 01/15/2014     Priority: Medium     Polysomnogram completed 1/15/14    Problem list name updated by automated process. Provider to review      Sleep related hypoventilation/hypoxemia in other disease 01/15/2014     Priority: Medium     Polysomnogram completed 1/15/14    Problem list name updated by automated process. Provider to review      Periodic limb movement disorder 01/15/2014     Priority: Medium     Polysomnogram completed 1/15/14        Blister 04/20/2013     Priority: Medium    Dermatitis 04/16/2013     Priority: Medium    Bladder incontinence 04/05/2012     Priority: Medium    Tinea pedis 01/11/2012     Priority: Medium    Callus of foot 12/18/2011     Priority: Medium    ST (skin tag) 12/18/2011     Priority: Medium    Inflamed seborrheic keratosis 12/18/2011     Priority: Medium    Idiopathic progressive polyneuropathy 11/09/2011     Priority: Medium    Female stress incontinence 04/27/2011     Priority: Medium        Past Medical/Surgical History:  Past Medical History:   Diagnosis Date    Cataracts, bilateral     Dyslipidemia     HTN (hypertension)     Statin  intolerance 10/22/2015     No past surgical history on file.    Social History:  Social History     Socioeconomic History    Marital status:      Spouse name: Not on file    Number of children: Not on file    Years of education: Not on file    Highest education level: Not on file   Occupational History    Not on file   Tobacco Use    Smoking status: Never     Passive exposure: Never    Smokeless tobacco: Never   Vaping Use    Vaping Use: Never used   Substance and Sexual Activity    Alcohol use: Yes     Alcohol/week: 0.8 standard drinks of alcohol     Types: 1 Glasses of wine per week    Drug use: Not on file    Sexual activity: Not on file   Other Topics Concern    Parent/sibling w/ CABG, MI or angioplasty before 65F 55M? Not Asked   Social History Narrative    Not on file     Social Determinants of Health     Financial Resource Strain: Not on file   Food Insecurity: Not on file   Transportation Needs: Not on file   Physical Activity: Not on file   Stress: Not on file   Social Connections: Not on file   Interpersonal Safety: Not on file   Housing Stability: Not on file       Family History:  No family history on file.    Review of Systems:  A complete review of systems reviewed by me is negative with the exeption of what has been mentioned in the history of present illness.  In the last TWO WEEKS have you experienced any of the following symptoms?  Fevers: No  Night Sweats: No  Sore Throat in Morning: No  Dry Mouth in the Morning: Yes  Shortness of Breath Lying Flat: No  Shortness of Breath With Activity: No  Awakening with Shortness of Breath: No  Increased Cough: Yes  Heart Racing at Night: No  Swelling in Feet or Legs: Yes  Diarrhea at Night: No  Heartburn at Night: Yes  Urinating More than Once at Night: Yes  Losing Control of Urine at Night: No  Joint Pains at Night: No  Headaches in Morning: No  Weakness in Arms or Legs: Yes  Depressed Mood: No  Anxiety: Yes    Physical Examination:  Vitals: /76   " Pulse 70   Resp 16   Ht 1.562 m (5' 1.5\")   Wt 86 kg (189 lb 9.6 oz)   SpO2 95%   BMI 35.24 kg/m    BMI= Body mass index is 35.24 kg/m .    Neck Cir (cm): 40 cm           Data: All pertinent previous laboratory data reviewed     Recent Labs   Lab Test 05/12/23  1140 08/25/21  1723    143   POTASSIUM 4.6 4.4   CHLORIDE 101 105   CO2 29 28   ANIONGAP 9 10   * 99   BUN 25.6* 14   CR 0.92 0.80   JAVIER 10.0 9.7       Recent Labs   Lab Test 05/12/23  1140   WBC 8.7   RBC 5.10   HGB 15.1   HCT 45.3   MCV 89   MCH 29.6   MCHC 33.3   RDW 12.4          Recent Labs   Lab Test 05/12/23  1140   PROTTOTAL 7.5   ALBUMIN 4.3   BILITOTAL 0.6   ALKPHOS 66   AST 16   ALT 15       TSH   Date Value   05/12/2023 2.79 uIU/mL   06/06/2019 1.00 mU/L   04/27/2016 1.28 mU/L         Chest x-ray:   XR Chest 2 Views 05/12/2023    Narrative  EXAM: XR CHEST 2 VIEWS 5/12/2023 11:41 AM    HISTORY: Chronic cough.    COMPARISON: Chest radiograph 9/17/2010    TECHNIQUE: Upright frontal and lateral views of the chest.    FINDINGS: Trachea is midline. Cardiac and mediastinal silhouette is  within normal limits. No focal pulmonary opacities. No pleural  effusions. No acute osseous abnormalities. Left humeral head  degenerative changes.    Impression  IMPRESSION: No focal pulmonary opacities.    I have personally reviewed the examination and initial interpretation  and I agree with the findings.    COURTNEY SO DO      SYSTEM ID:  [id number]      Chest CT: No results found for this or any previous visit from the past 365 days.      DAVID Valencia 9/28/2023           "

## 2024-02-12 ENCOUNTER — TELEPHONE (OUTPATIENT)
Dept: SLEEP MEDICINE | Facility: CLINIC | Age: 84
End: 2024-02-12

## 2024-02-16 ENCOUNTER — ANCILLARY PROCEDURE (OUTPATIENT)
Dept: GENERAL RADIOLOGY | Facility: CLINIC | Age: 84
End: 2024-02-16
Attending: FAMILY MEDICINE
Payer: COMMERCIAL

## 2024-02-16 ENCOUNTER — OFFICE VISIT (OUTPATIENT)
Dept: FAMILY MEDICINE | Facility: CLINIC | Age: 84
End: 2024-02-16
Payer: COMMERCIAL

## 2024-02-16 ENCOUNTER — LAB (OUTPATIENT)
Dept: LAB | Facility: CLINIC | Age: 84
End: 2024-02-16
Payer: COMMERCIAL

## 2024-02-16 VITALS
DIASTOLIC BLOOD PRESSURE: 107 MMHG | OXYGEN SATURATION: 96 % | BODY MASS INDEX: 37.35 KG/M2 | HEART RATE: 72 BPM | WEIGHT: 200.9 LBS | SYSTOLIC BLOOD PRESSURE: 176 MMHG

## 2024-02-16 DIAGNOSIS — E78.5 HYPERLIPIDEMIA WITH TARGET LDL LESS THAN 100: ICD-10-CM

## 2024-02-16 DIAGNOSIS — M25.571 PAIN IN JOINT INVOLVING ANKLE AND FOOT, RIGHT: ICD-10-CM

## 2024-02-16 DIAGNOSIS — R06.09 DOE (DYSPNEA ON EXERTION): ICD-10-CM

## 2024-02-16 DIAGNOSIS — R53.83 FATIGUE, UNSPECIFIED TYPE: ICD-10-CM

## 2024-02-16 DIAGNOSIS — R69 MULTIPLE MEDICAL PROBLEMS: ICD-10-CM

## 2024-02-16 DIAGNOSIS — R26.89 IMBALANCE: ICD-10-CM

## 2024-02-16 DIAGNOSIS — R06.09 DOE (DYSPNEA ON EXERTION): Primary | ICD-10-CM

## 2024-02-16 LAB
ALBUMIN SERPL BCG-MCNC: 4 G/DL (ref 3.5–5.2)
ALP SERPL-CCNC: 73 U/L (ref 40–150)
ALT SERPL W P-5'-P-CCNC: 14 U/L (ref 0–50)
ANION GAP SERPL CALCULATED.3IONS-SCNC: 10 MMOL/L (ref 7–15)
AST SERPL W P-5'-P-CCNC: 17 U/L (ref 0–45)
BASOPHILS # BLD AUTO: NORMAL 10*3/UL
BASOPHILS # BLD MANUAL: 0 10E3/UL (ref 0–0.2)
BASOPHILS NFR BLD AUTO: NORMAL %
BASOPHILS NFR BLD MANUAL: 0 %
BILIRUB SERPL-MCNC: 0.4 MG/DL
BUN SERPL-MCNC: 15.6 MG/DL (ref 8–23)
CALCIUM SERPL-MCNC: 10.6 MG/DL (ref 8.8–10.2)
CHLORIDE SERPL-SCNC: 100 MMOL/L (ref 98–107)
CREAT SERPL-MCNC: 0.78 MG/DL (ref 0.51–0.95)
DEPRECATED HCO3 PLAS-SCNC: 29 MMOL/L (ref 22–29)
EGFRCR SERPLBLD CKD-EPI 2021: 76 ML/MIN/1.73M2
EOSINOPHIL # BLD AUTO: NORMAL 10*3/UL
EOSINOPHIL # BLD MANUAL: 0 10E3/UL (ref 0–0.7)
EOSINOPHIL NFR BLD AUTO: NORMAL %
EOSINOPHIL NFR BLD MANUAL: 0 %
ERYTHROCYTE [DISTWIDTH] IN BLOOD BY AUTOMATED COUNT: 12.5 % (ref 10–15)
GLUCOSE SERPL-MCNC: 98 MG/DL (ref 70–99)
HCT VFR BLD AUTO: 44.4 % (ref 35–47)
HGB BLD-MCNC: 14.8 G/DL (ref 11.7–15.7)
IMM GRANULOCYTES # BLD: NORMAL 10*3/UL
IMM GRANULOCYTES NFR BLD: NORMAL %
LYMPHOCYTES # BLD AUTO: NORMAL 10*3/UL
LYMPHOCYTES # BLD MANUAL: 3.4 10E3/UL (ref 0.8–5.3)
LYMPHOCYTES NFR BLD AUTO: NORMAL %
LYMPHOCYTES NFR BLD MANUAL: 36 %
MCH RBC QN AUTO: 29.8 PG (ref 26.5–33)
MCHC RBC AUTO-ENTMCNC: 33.3 G/DL (ref 31.5–36.5)
MCV RBC AUTO: 89 FL (ref 78–100)
MONOCYTES # BLD AUTO: NORMAL 10*3/UL
MONOCYTES # BLD MANUAL: 0.7 10E3/UL (ref 0–1.3)
MONOCYTES NFR BLD AUTO: NORMAL %
MONOCYTES NFR BLD MANUAL: 7 %
NEUTROPHILS # BLD AUTO: NORMAL 10*3/UL
NEUTROPHILS # BLD MANUAL: 5.4 10E3/UL (ref 1.6–8.3)
NEUTROPHILS NFR BLD AUTO: NORMAL %
NEUTROPHILS NFR BLD MANUAL: 57 %
NRBC # BLD AUTO: 0 10E3/UL
NRBC BLD AUTO-RTO: 0 /100
NT-PROBNP SERPL-MCNC: 329 PG/ML (ref 0–1800)
PLAT MORPH BLD: NORMAL
PLATELET # BLD AUTO: 281 10E3/UL (ref 150–450)
POTASSIUM SERPL-SCNC: 4.4 MMOL/L (ref 3.4–5.3)
PROT SERPL-MCNC: 7.4 G/DL (ref 6.4–8.3)
RBC # BLD AUTO: 4.97 10E6/UL (ref 3.8–5.2)
RBC MORPH BLD: NORMAL
SODIUM SERPL-SCNC: 139 MMOL/L (ref 135–145)
TSH SERPL DL<=0.005 MIU/L-ACNC: 2.58 UIU/ML (ref 0.3–4.2)
WBC # BLD AUTO: 9.4 10E3/UL (ref 4–11)

## 2024-02-16 PROCEDURE — 73610 X-RAY EXAM OF ANKLE: CPT | Mod: RT | Performed by: RADIOLOGY

## 2024-02-16 PROCEDURE — 84443 ASSAY THYROID STIM HORMONE: CPT | Performed by: PATHOLOGY

## 2024-02-16 PROCEDURE — 85027 COMPLETE CBC AUTOMATED: CPT | Performed by: PATHOLOGY

## 2024-02-16 PROCEDURE — 85007 BL SMEAR W/DIFF WBC COUNT: CPT | Performed by: PATHOLOGY

## 2024-02-16 PROCEDURE — 36415 COLL VENOUS BLD VENIPUNCTURE: CPT | Performed by: PATHOLOGY

## 2024-02-16 PROCEDURE — 80053 COMPREHEN METABOLIC PANEL: CPT | Performed by: PATHOLOGY

## 2024-02-16 PROCEDURE — 83880 ASSAY OF NATRIURETIC PEPTIDE: CPT | Performed by: PATHOLOGY

## 2024-02-16 PROCEDURE — 71046 X-RAY EXAM CHEST 2 VIEWS: CPT | Performed by: RADIOLOGY

## 2024-02-16 PROCEDURE — 99215 OFFICE O/P EST HI 40 MIN: CPT | Performed by: FAMILY MEDICINE

## 2024-02-16 RX ORDER — EZETIMIBE 10 MG/1
10 TABLET ORAL DAILY
Qty: 90 TABLET | Refills: 3 | Status: SHIPPED | OUTPATIENT
Start: 2024-02-16

## 2024-02-16 RX ORDER — RESPIRATORY SYNCYTIAL VIRUS VACCINE 120MCG/0.5
0.5 KIT INTRAMUSCULAR ONCE
Qty: 1 EACH | Refills: 0 | Status: CANCELLED | OUTPATIENT
Start: 2024-02-16 | End: 2024-02-16

## 2024-02-16 NOTE — PROGRESS NOTES
Azul is a 83 year old that presents in clinic today for the following:     Chief Complaint   Patient presents with    General Visit     1. Right ankle pain and R back lag pain, fell and injured in 12/11/24; questions about neuropathy  2. Shooting pain in her fingers   3. Fatigue without extraneous physical activity   4. Zetia medication concerns            2/16/2024     2:46 PM   Additional Questions   Roomed by YW   Accompanied by DaughterLeila     Screenings as of today     Fallen 2 or more times in the past year? No        EUGENIE Buckley at 2:54 PM on 2/16/2024

## 2024-02-19 ENCOUNTER — TELEPHONE (OUTPATIENT)
Dept: ORTHOPEDICS | Facility: CLINIC | Age: 84
End: 2024-02-19
Payer: COMMERCIAL

## 2024-02-19 ENCOUNTER — TELEPHONE (OUTPATIENT)
Dept: INTERNAL MEDICINE | Facility: CLINIC | Age: 84
End: 2024-02-19

## 2024-02-19 NOTE — TELEPHONE ENCOUNTER
Pt was informed the results. Pt has an appt with sports medicine on 3/1/24.  I encouraged her to call [facility] to find home care agencies in her home area and let me know. Then I will take care of the rest of everything.    Soon-Mi  ----------------------------------------------------------------------    ----- Message from Babatunde Mueller MD sent at 2/19/2024  2:04 PM CST -----  Can you let her know labs, xrays are fine  Plan is:  Try to see Sp Med and PT in town soon [place]    Also I think I messaged you she could use HHN but lives up near [facility], if you or someone could help find a place to refer to would be good

## 2024-02-20 ENCOUNTER — PATIENT OUTREACH (OUTPATIENT)
Dept: CARE COORDINATION | Facility: CLINIC | Age: 84
End: 2024-02-20
Payer: COMMERCIAL

## 2024-02-20 NOTE — PROGRESS NOTES
Clinic Care Coordination Contact  [facility]    Clinical Data: Care Coordinator Outreach    Outreach Documentation Number of Outreach Attempt   2/22/2024  12:13 PM 1     Referred by PCP for Complex Medical Concerns/Education (    Many health issues; [place], but then sometimes stays here in town w/ family. Many health issues, decisions to make about where to live, maybe looking into senior housing   ) on 2/16/24.     Left message on patient's voicemail with call back information and requested return call.    Plan: Care Coordinator will try to reach patient again in 3-5 business days.    CLIFF MERRITT, RN on 2/22/2024 at 12:13 PM

## 2024-02-21 ENCOUNTER — TELEPHONE (OUTPATIENT)
Dept: FAMILY MEDICINE | Facility: CLINIC | Age: 84
End: 2024-02-21
Payer: COMMERCIAL

## 2024-02-28 NOTE — PROGRESS NOTES
Clinic Care Coordination Contact  [facility]/Voicemail    Clinical Data: Care Coordinator Outreach    Outreach Documentation Number of Outreach Attempt   2/22/2024  12:13 PM 1   2/28/2024  12:01 PM 2       Left message on patient's voicemail with call back information and requested return call.    Plan: Care Coordinator will send unable to contact letter with care coordinator contact information via mail. Care Coordinator will do no further outreaches at this time.    CLIFF MERRITT RN on 2/28/2024 at 12:01 PM

## 2024-02-29 ENCOUNTER — OFFICE VISIT (OUTPATIENT)
Dept: SLEEP MEDICINE | Facility: CLINIC | Age: 84
End: 2024-02-29
Payer: COMMERCIAL

## 2024-02-29 VITALS
OXYGEN SATURATION: 94 % | RESPIRATION RATE: 16 BRPM | HEART RATE: 75 BPM | WEIGHT: 209.8 LBS | HEIGHT: 62 IN | DIASTOLIC BLOOD PRESSURE: 84 MMHG | BODY MASS INDEX: 38.61 KG/M2 | SYSTOLIC BLOOD PRESSURE: 175 MMHG

## 2024-02-29 DIAGNOSIS — G47.33 OSA (OBSTRUCTIVE SLEEP APNEA): Primary | ICD-10-CM

## 2024-02-29 PROCEDURE — 99214 OFFICE O/P EST MOD 30 MIN: CPT | Performed by: PHYSICIAN ASSISTANT

## 2024-02-29 ASSESSMENT — SLEEP AND FATIGUE QUESTIONNAIRES
HOW LIKELY ARE YOU TO NOD OFF OR FALL ASLEEP WHILE SITTING AND READING: HIGH CHANCE OF DOZING
HOW LIKELY ARE YOU TO NOD OFF OR FALL ASLEEP WHILE LYING DOWN TO REST IN THE AFTERNOON WHEN CIRCUMSTANCES PERMIT: HIGH CHANCE OF DOZING
HOW LIKELY ARE YOU TO NOD OFF OR FALL ASLEEP WHILE SITTING INACTIVE IN A PUBLIC PLACE: WOULD NEVER DOZE
HOW LIKELY ARE YOU TO NOD OFF OR FALL ASLEEP IN A CAR, WHILE STOPPED FOR A FEW MINUTES IN TRAFFIC: WOULD NEVER DOZE
HOW LIKELY ARE YOU TO NOD OFF OR FALL ASLEEP WHEN YOU ARE A PASSENGER IN A CAR FOR AN HOUR WITHOUT A BREAK: WOULD NEVER DOZE
HOW LIKELY ARE YOU TO NOD OFF OR FALL ASLEEP WHILE SITTING AND TALKING TO SOMEONE: WOULD NEVER DOZE
HOW LIKELY ARE YOU TO NOD OFF OR FALL ASLEEP WHILE SITTING QUIETLY AFTER LUNCH WITHOUT ALCOHOL: MODERATE CHANCE OF DOZING
HOW LIKELY ARE YOU TO NOD OFF OR FALL ASLEEP WHILE WATCHING TV: HIGH CHANCE OF DOZING

## 2024-02-29 NOTE — PROGRESS NOTES
Sleep Apnea - Follow-up Visit:    Impression/Plan:    Severe Sleep apnea.  Tolerating PAP well until recent issues with her CPAP machine and it is being repaired. Daytime symptoms are improved.   Continue current treatment and will ask for a loaner while he machine is being repaired.  Comprehensive DME order placed.     Jessica Loaiza will follow up in about 6 months or sooner if any concerns    30 minutes spent on day of encounter doing chart review,  history and exam, counseling, coordinating plan of care, documentation and further activities as noted above.       Marisela Valadez PA-C  Sleep Medicine     History of Present Illness:  No chief complaint on file.      Jessica Loaiza presents for follow-up of their severe sleep apnea, managed with CPAP.     She initially presented with loud snoring with associated snort arousals for many years, non restorative sleep, dry mouth in the morning and acid reflux at night.    Polysomnogram completed 1/15/14 (186#)-AHI 32, RDI 35, lowest oxygen saturation was 77, Optimum CPAP not achieved. Patient sent home on auto-CPAP 5-15 cm/H20.   CPAP DME:  [facility]    Do you use a CPAP Machine at home: No  Overall, on a scale of 0-10 how would you rate your CPAP (0 poor, 10 great):      What type of mask do you use:  nasal pillows    ResMed   Auto-PAP 6 - 16 cmH2O 30 day usage data:    70% of days with > 4 hours of use. 14/60 days with no use.   Average use 7 hours and 36 minutes per day.   95%ile Leak 23 L/min.   CPAP 95% pressure 10.8 cm.   AHI 2.2 events per hour.     Her machine is being repaired.       EPWORTH SLEEPINESS SCALE         2/29/2024     2:17 PM    [place] Sleepiness Scale ( PELON Chacon  [id number]<br>ESS - [place]/English - Final version - 21 Nov 07 - [facility].)   Sitting and reading High chance of dozing   Watching TV High chance of dozing   Sitting, inactive in a public place (e.g. a theatre or a meeting) Would never doze   As a passenger in a car for an  hour without a break Would never doze   Lying down to rest in the afternoon when circumstances permit High chance of dozing   Sitting and talking to someone Would never doze   Sitting quietly after a lunch without alcohol Moderate chance of dozing   In a car, while stopped for a few minutes in traffic Would never doze   Beacon Score (MC) 11   Beacon Score (Sleep) 11       INSOMNIA SEVERITY INDEX (TTIO)          2/29/2024     2:13 PM   Insomnia Severity Index (TITO)   Difficulty falling asleep 4   Difficulty staying asleep 2   Problems waking up too early 0   How SATISFIED/DISSATISFIED are you with your CURRENT sleep pattern? 2   How NOTICEABLE to others do you think your sleep problem is in terms of impairing the quality of your life? 1   How WORRIED/DISTRESSED are you about your current sleep problem? 0   To what extent do you consider your sleep problem to INTERFERE with your daily functioning (e.g. daytime fatigue, mood, ability to function at work/daily chores, concentration, memory, mood, etc.) CURRENTLY? 1   TITO Total Score 10       Guidelines for Scoring/Interpretation:  Total score categories:  0-7 = No clinically significant insomnia   8-14 = Subthreshold insomnia   15-21 = Clinical insomnia (moderate severity)  22-28 = Clinical insomnia (severe)  Used via courtesy of www.Octopartealth.va.gov with permission from Alfredo Pacheco PhD., [facility]        Past medical/surgical history, family history, social history, medications and allergies were reviewed.        Problem List:  Patient Active Problem List    Diagnosis Date Noted    Statin intolerance 10/22/2015     Priority: Medium    Hyperlipidemia with target LDL less than 100 06/05/2014     Priority: Medium     Diagnosis updated by automated process. Provider to review and confirm.      Obstructive sleep apnea 01/15/2014     Priority: Medium     Polysomnogram completed 1/15/14 (186#)-AHI 32, RDI 35, lowest oxygen saturation was 77, Optimum CPAP not  achieved. Patient sent home on auto-CPAP 5-15 cm/H20      Disturbance in sleep behavior 01/15/2014     Priority: Medium     Polysomnogram completed 1/15/14    Problem list name updated by automated process. Provider to review      Sleep related hypoventilation/hypoxemia in other disease 01/15/2014     Priority: Medium     Polysomnogram completed 1/15/14    Problem list name updated by automated process. Provider to review      Periodic limb movement disorder 01/15/2014     Priority: Medium     Polysomnogram completed 1/15/14        Blister 04/20/2013     Priority: Medium    Dermatitis 04/16/2013     Priority: Medium    Bladder incontinence 04/05/2012     Priority: Medium    Tinea pedis 01/11/2012     Priority: Medium    Callus of foot 12/18/2011     Priority: Medium    ST (skin tag) 12/18/2011     Priority: Medium    Inflamed seborrheic keratosis 12/18/2011     Priority: Medium    Idiopathic progressive polyneuropathy 11/09/2011     Priority: Medium    Female stress incontinence 04/27/2011     Priority: Medium        There were no vitals taken for this visit.

## 2024-02-29 NOTE — PATIENT INSTRUCTIONS
Your Body mass index is 39 kg/m .  Weight management is a personal decision.  If you are interested in exploring weight loss strategies, the following discussion covers the approaches that may be successful. Body mass index (BMI) is one way to tell whether you are at a healthy weight, overweight, or obese. It measures your weight in relation to your height.  A BMI of 18.5 to 24.9 is in the healthy range. A person with a BMI of 25 to 29.9 is considered overweight, and someone with a BMI of 30 or greater is considered obese. More than two-thirds of American adults are considered overweight or obese.  Being overweight or obese increases the risk for further weight gain. Excess weight may lead to heart disease and diabetes.  Creating and following plans for healthy eating and physical activity may help you improve your health.  Weight control is part of healthy lifestyle and includes exercise, emotional health, and healthy eating habits. Careful eating habits lifelong are the mainstay of weight control. Though there are significant health benefits from weight loss, long-term weight loss with diet alone may be very difficult to achieve- studies show long-term success with dietary management in less than 10% of people. Attaining a healthy weight may be especially difficult to achieve in those with severe obesity. In some cases, medications, devices and surgical management might be considered.  What can you do?  If you are overweight or obese and are interested in methods for weight loss, you should discuss this with your provider.   Consider reducing daily calorie intake by 500 calories.   Keep a food journal.   Avoiding skipping meals, consider cutting portions instead.    Diet combined with exercise helps maintain muscle while optimizing fat loss. Strength training is particularly important for building and maintaining muscle mass. Exercise helps reduce stress, increase energy, and improves fitness. Increasing  exercise without diet control, however, may not burn enough calories to loose weight.     Start walking three days a week 10-20 minutes at a time  Work towards walking thirty minutes five days a week   Eventually, increase the speed of your walking for 1-2 minutes at time    In addition, we recommend that you review healthy lifestyles and methods for weight loss available through the National Institutes of Health patient information sites:  http://win.niddk.nih.gov/publications/index.htm    And look into health and wellness programs that may be available through your health insurance provider, employer, local community center, or [employer]iana club.

## 2024-03-01 ENCOUNTER — TELEPHONE (OUTPATIENT)
Dept: ORTHOPEDICS | Facility: CLINIC | Age: 84
End: 2024-03-01

## 2024-03-01 ENCOUNTER — OFFICE VISIT (OUTPATIENT)
Dept: ORTHOPEDICS | Facility: CLINIC | Age: 84
End: 2024-03-01
Payer: COMMERCIAL

## 2024-03-01 DIAGNOSIS — M17.11 PRIMARY OSTEOARTHRITIS OF RIGHT KNEE: Primary | ICD-10-CM

## 2024-03-01 DIAGNOSIS — M25.561 CHRONIC PAIN OF RIGHT KNEE: ICD-10-CM

## 2024-03-01 DIAGNOSIS — G89.29 CHRONIC PAIN OF RIGHT KNEE: ICD-10-CM

## 2024-03-01 DIAGNOSIS — M25.571 PAIN IN JOINT INVOLVING ANKLE AND FOOT, RIGHT: ICD-10-CM

## 2024-03-01 PROCEDURE — 20610 DRAIN/INJ JOINT/BURSA W/O US: CPT | Mod: RT | Performed by: FAMILY MEDICINE

## 2024-03-01 PROCEDURE — 99213 OFFICE O/P EST LOW 20 MIN: CPT | Mod: 25 | Performed by: FAMILY MEDICINE

## 2024-03-01 RX ADMIN — TRIAMCINOLONE ACETONIDE 40 MG: 40 INJECTION, SUSPENSION INTRA-ARTICULAR; INTRAMUSCULAR at 12:08

## 2024-03-01 ASSESSMENT — PAIN SCALES - GENERAL: PAINLEVEL: EXTREME PAIN (8)

## 2024-03-01 NOTE — LETTER
3/1/2024         RE: Jessica Loaiza  [address]  [place] [address]        Dear Colleague,    Thank you for referring your patient, Jessica Loaiza, to the [facility] [place]. Please see a copy of my visit note below.    CHIEF COMPLAINT:  Pain and New Patient of the Right Ankle (Fall in 12/23) and New Patient and Pain of the Right Foot       HISTORY OF PRESENT ILLNESS  Ms. Loaiza is a pleasant 83 year old female who presents to clinic today with right ankle pain.  Azul injured her ankle over the past month.  She was in a squat when she fell, twisting her ankle in the process.  Since then she has had persistent lateral ankle pain that is fairly severe.  This is worse with walking and weightbearing, although the swelling is persistent at all times.    She also has right knee osteoarthritis, she has had a corticosteroid injection and a hyaluronic acid injection in the past, the hyaluronic acid did help her for quite a while.        Additional history: as documented    MEDICAL HISTORY  Patient Active Problem List   Diagnosis     Female stress incontinence     Idiopathic progressive polyneuropathy     Callus of foot     ST (skin tag)     Inflamed seborrheic keratosis     Tinea pedis     Bladder incontinence     Dermatitis     Blister     Obstructive sleep apnea     Disturbance in sleep behavior     Sleep related hypoventilation/hypoxemia in other disease     Periodic limb movement disorder     Hyperlipidemia with target LDL less than 100     Statin intolerance       Current Outpatient Medications   Medication Sig Dispense Refill     aspirin 81 MG tablet Take 1 tablet by mouth daily.       Calcium Citrate-Vitamin D (CITRACAL + D PO) Take 1 tablet by mouth daily        citalopram (CELEXA) 20 MG tablet Take 1 tablet (20 mg) by mouth daily 90 tablet 3     citalopram (CELEXA) 20 MG tablet Take 1 tablet (20 mg) by mouth daily 30 tablet 0     COPPER PO Take 1 tablet by  mouth as needed       cyabnocobalamin (VITAMIN B-12) 2500 MCG sublingual tablet Take 1 tablet by mouth daily Taking daily.       econazole nitrate 1 % cream Apply topically 2 times daily 85 g 5     ezetimibe (ZETIA) 10 MG tablet Take 1 tablet (10 mg) by mouth daily 90 tablet 3     ezetimibe (ZETIA) 10 MG tablet Take 1 tablet (10 mg) by mouth daily 90 tablet 3     hydrochlorothiazide (HYDRODIURIL) 25 MG tablet Take 1 tablet (25 mg) by mouth daily 90 tablet 3     losartan (COZAAR) 100 MG tablet Take 1 tablet (100 mg) by mouth daily 90 tablet 3     metoprolol succinate ER (TOPROL XL) 50 MG 24 hr tablet Take 1 tablet (50 mg) by mouth daily 90 tablet 3     minoxidil (ROGAINE) 2 % external solution Apply topically daily        Multiple Vitamins-Minerals (CENTRUM SILVER PO) Take  by mouth.       Omega-3 Fatty Acids (OMEGA 3 PO) Take 1 capsule by mouth daily        omeprazole (PRILOSEC) 20 MG CR capsule Take 1 tablet (20 mg) by mouth daily Take 30-60 minutes before a meal. (Patient taking differently: Take 1 tablet (20 mg) by mouth daily Take 30-60 minutes before a meal.    Taking every other day. Alternates with Zantac.) 90 capsule 1     omeprazole (PRILOSEC) 20 MG DR capsule Take 1 capsule (20 mg) by mouth daily 90 capsule 3     order for DME Equipment being ordered: Right wrist velcro brace with a thumb 1 Device 0     ORDER FOR DME Use your CPAP device as directed by your provider.       polyethylene glycol 0.4%- propylene glycol 0.3% (SYSTANE ULTRA) 0.4-0.3 % SOLN ophthalmic solution Place 1 drop into both eyes 2 times daily       psyllium (METAMUCIL SMOOTH TEXTURE) 63 % POWD Take 1 Tablespoonful by mouth 3 times daily Mix in 8 ounces of water 3 Bottle 3     RaNITidine HCl (ZANTAC PO) Alternates with Prilosec.       SALICYLIC ACID 40%, UREA 20% IN PETROLEUM, FV COMPOUNDED,    CREAM Twice daily 120 g 3     UNABLE TO FIND daily MEDICATION NAME: Charles Support       Urea 40 % CREA Externally apply  topically. Apply to feet  daily 60 g 3       Allergies   Allergen Reactions     Gabapentin Nausea     Ibuprofen Sodium GI Disturbance     Simvastatin Cramps     Other reaction(s): Other  Groin and leg cramps       No family history on file.    Additional medical/Social/Surgical histories reviewed in EPIC and updated as appropriate.        PHYSICAL EXAM  General  - normal appearance, in no obvious distress    Musculoskeletal - right ankle  - stance: gait favors affected side  - inspection: moderate swelling laterally, normal bone and joint alignment, no obvious deformity  - palpation: tenderness over ATFL, no tenderness over lateral or medial malleoli, navicular, or base of 5th MT  - ROM: intact globally but limited grossly  - strength: 5/5 in all planes  - special tests:  (-) anterior drawer  (-) talar tilt    Musculoskeletal - right knee  - inspection: trace effusion  - palpation: medial and lateral joint line tenderness  - ROM: painful active ROM  - special tests:  (-) Saul  (-) varus at 0 and 30 degrees flexion  (-) valgus at 0 and 30 degrees flexion    Neuro  - no sensory or motor deficit, grossly normal coordination, normal muscle tone             ASSESSMENT & PLAN  Ms. Loaiza is a 83 year old female who presents to clinic today with right ankle pain and right knee osteoarthritis.    I reviewed her x-ray in the room with her, this is from February 16 and shows no obvious acute issues.  Given her level of pain and lack of improvement I am ordering an MRI.  I also gave her an ankle brace to help with stability.    As for her knee I did inject her knee with corticosteroid today.  We are starting the process for hyaluronic acid approval as well.  She can return to clinic to have this done when her knee is bothering her again.    It was a pleasure seeing Azul today.    Neal Oviedo DO, [facility]  Primary Care Sports Medicine      This note was constructed using Dragon dictation software, please excuse any minor errors in spelling,  grammar, or syntax.      Large Joint Injection/Arthocentesis: R knee joint    Date/Time: 3/1/2024 12:08 PM    Performed by: Neal Oviedo DO  Authorized by: Neal Oviedo DO    Indications:  Pain  Needle Size:  22 G  Guidance: landmark guided    Approach:  Lateral  Location:  Knee      Medications:  40 mg triamcinolone 40 MG/ML  Outcome:  Tolerated well, no immediate complications  Procedure discussed: discussed risks, benefits, and alternatives    Consent Given by:  Patient  Timeout: timeout called immediately prior to procedure    Prep: patient was prepped and draped in usual sterile fashion       PROCEDURE    Knee Injection - Intraarticular  The patient was informed of the risks and the benefits of the procedure and a written consent was signed.  The patient's right knee was prepped with chlorhexidine in sterile fashion.   40 mg of triamcinolone suspension was drawn up into a 5 mL syringe with 4 mL of 1% lidocaine.  Injection was performed using substerile technique.  A 1.5-inch 22-gauge needle was used to enter the lateral aspect of the knee.  Injection performed successfully without difficulty.  There were no complications. The patient tolerated the procedure well. There was negligible bleeding.                 Again, thank you for allowing me to participate in the care of your patient.        Sincerely,        Neal Oviedo DO

## 2024-03-01 NOTE — NURSING NOTE
[facility]   ORTHOPEDICS & SPORTS MEDICINE  [address] [place] [address]  Dept: [phone]  ______________________________________________________________________________    Patient: Jessica Loaiza   : 1940   MRN: [id number]   2024    INVASIVE PROCEDURE SAFETY CHECKLIST    Date: 3/1/2024   Procedure: Right knee injection  Patient Name: Jessica Loaiza  MRN: [id number]  YOB: 1940    Action: Complete sections as appropriate. Any discrepancy results in a HARD COPY until resolved.     PRE PROCEDURE:  Patient ID verified with 2 identifiers (name and  or MRN): Yes  Procedure and site verified with patient/designee (when able): Yes  Accurate consent documentation in medical record: Yes  H&P (or appropriate assessment) documented in medical record: Yes  H&P must be up to 20 days prior to procedure and updates within 24 hours of procedure as applicable: NA  Relevant diagnostic and radiology test results appropriately labeled and displayed as applicable: NA  Procedure site(s) marked with provider initials: NA    TIMEOUT:  Time-Out performed immediately prior to starting procedure, including verbal and active participation of all team members addressing the following:Yes  * Correct patient identify  * Confirmed that the correct side and site are marked  * An accurate procedure consent form  * Agreement on the procedure to be done  * Correct patient position  * Relevant images and results are properly labeled and appropriately displayed  * The need to administer antibiotics or fluids for irrigation purposes during the procedure as applicable   * Safety precautions based on patient history or medication use    DURING PROCEDURE: Verification of correct person, site, and procedures any time the responsibility for care of the patient is transferred to another member of the care team.       Prior to injection, verified patient identity using patient's name and date of  birth.  Due to injection administration, patient instructed to remain in clinic for 15 minutes  afterwards, and to report any adverse reaction to me immediately.    Joint injection was performed.      Drug Amount Wasted:  None.  Vial/Syringe: Single dose vial  Expiration Date:  11/30/2025      Rehan Segal, EMT  March 1, 2024

## 2024-03-01 NOTE — NURSING NOTE
Chief Complaint   Patient presents with    Right Ankle - Pain, New Patient     Fall in 12/23    Right Foot - New Patient, Pain       There were no vitals filed for this visit.    There is no height or weight on file to calculate BMI.      ROBERTO Van NREMT

## 2024-03-01 NOTE — TELEPHONE ENCOUNTER
Left Voicemail (1st Attempt) for the patient to call back and schedule the following:    Appointment type: return  Provider: dr. bonds  Return date: couple days after mri  Specialty phone number: [phone]  Additional appointment(s) needed: mri   Additonal Notes: follow up to discuss results of mri.     [facility]   Orthopedics, Podiatry, Sports Medicine, Ent ,Eye , Audiology, Adult Endocrine & Diabetes, Nutrition & Medication Therapy Management Specialties   [facility]

## 2024-03-01 NOTE — TELEPHONE ENCOUNTER
[facility] Call Center    Phone Message    May a detailed message be left on voicemail: yes     Reason for Call: Other: Pt was told to schedule a f/up after MRI appt with Dr. Oviedo. Unable to perform appt via telephone for Dr. vOiedo . Care team will have to schedule appt via telephone.      Action Taken: Other: [facility] Sports Medicine [place] [[id number]]    Travel Screening: Not Applicable

## 2024-03-01 NOTE — PROGRESS NOTES
CHIEF COMPLAINT:  Pain and New Patient of the Right Ankle (Fall in 12/23) and New Patient and Pain of the Right Foot       HISTORY OF PRESENT ILLNESS  Ms. Loaiza is a pleasant 83 year old female who presents to clinic today with right ankle pain.  Azul injured her ankle over the past month.  She was in a squat when she fell, twisting her ankle in the process.  Since then she has had persistent lateral ankle pain that is fairly severe.  This is worse with walking and weightbearing, although the swelling is persistent at all times.    She also has right knee osteoarthritis, she has had a corticosteroid injection and a hyaluronic acid injection in the past, the hyaluronic acid did help her for quite a while.        Additional history: as documented    MEDICAL HISTORY  Patient Active Problem List   Diagnosis    Female stress incontinence    Idiopathic progressive polyneuropathy    Callus of foot    ST (skin tag)    Inflamed seborrheic keratosis    Tinea pedis    Bladder incontinence    Dermatitis    Blister    Obstructive sleep apnea    Disturbance in sleep behavior    Sleep related hypoventilation/hypoxemia in other disease    Periodic limb movement disorder    Hyperlipidemia with target LDL less than 100    Statin intolerance       Current Outpatient Medications   Medication Sig Dispense Refill    aspirin 81 MG tablet Take 1 tablet by mouth daily.      Calcium Citrate-Vitamin D (CITRACAL + D PO) Take 1 tablet by mouth daily       citalopram (CELEXA) 20 MG tablet Take 1 tablet (20 mg) by mouth daily 90 tablet 3    citalopram (CELEXA) 20 MG tablet Take 1 tablet (20 mg) by mouth daily 30 tablet 0    COPPER PO Take 1 tablet by mouth as needed      cyabnocobalamin (VITAMIN B-12) 2500 MCG sublingual tablet Take 1 tablet by mouth daily Taking daily.      econazole nitrate 1 % cream Apply topically 2 times daily 85 g 5    ezetimibe (ZETIA) 10 MG tablet Take 1 tablet (10 mg) by mouth daily 90 tablet 3    ezetimibe (ZETIA)  10 MG tablet Take 1 tablet (10 mg) by mouth daily 90 tablet 3    hydrochlorothiazide (HYDRODIURIL) 25 MG tablet Take 1 tablet (25 mg) by mouth daily 90 tablet 3    losartan (COZAAR) 100 MG tablet Take 1 tablet (100 mg) by mouth daily 90 tablet 3    metoprolol succinate ER (TOPROL XL) 50 MG 24 hr tablet Take 1 tablet (50 mg) by mouth daily 90 tablet 3    minoxidil (ROGAINE) 2 % external solution Apply topically daily       Multiple Vitamins-Minerals (CENTRUM SILVER PO) Take  by mouth.      Omega-3 Fatty Acids (OMEGA 3 PO) Take 1 capsule by mouth daily       omeprazole (PRILOSEC) 20 MG CR capsule Take 1 tablet (20 mg) by mouth daily Take 30-60 minutes before a meal. (Patient taking differently: Take 1 tablet (20 mg) by mouth daily Take 30-60 minutes before a meal.    Taking every other day. Alternates with Zantac.) 90 capsule 1    omeprazole (PRILOSEC) 20 MG DR capsule Take 1 capsule (20 mg) by mouth daily 90 capsule 3    order for DME Equipment being ordered: Right wrist velcro brace with a thumb 1 Device 0    ORDER FOR DME Use your CPAP device as directed by your provider.      polyethylene glycol 0.4%- propylene glycol 0.3% (SYSTANE ULTRA) 0.4-0.3 % SOLN ophthalmic solution Place 1 drop into both eyes 2 times daily      psyllium (METAMUCIL SMOOTH TEXTURE) 63 % POWD Take 1 Tablespoonful by mouth 3 times daily Mix in 8 ounces of water 3 Bottle 3    RaNITidine HCl (ZANTAC PO) Alternates with Prilosec.      SALICYLIC ACID 40%, UREA 20% IN PETROLEUM, FV COMPOUNDED,    CREAM Twice daily 120 g 3    UNABLE TO FIND daily MEDICATION NAME: Charles Support      Urea 40 % CREA Externally apply  topically. Apply to feet daily 60 g 3       Allergies   Allergen Reactions    Gabapentin Nausea    Ibuprofen Sodium GI Disturbance    Simvastatin Cramps     Other reaction(s): Other  Groin and leg cramps       No family history on file.    Additional medical/Social/Surgical histories reviewed in [facility] and updated as appropriate.         PHYSICAL EXAM  General  - normal appearance, in no obvious distress    Musculoskeletal - right ankle  - stance: gait favors affected side  - inspection: moderate swelling laterally, normal bone and joint alignment, no obvious deformity  - palpation: tenderness over ATFL, no tenderness over lateral or medial malleoli, navicular, or base of 5th MT  - ROM: intact globally but limited grossly  - strength: 5/5 in all planes  - special tests:  (-) anterior drawer  (-) talar tilt    Musculoskeletal - right knee  - inspection: trace effusion  - palpation: medial and lateral joint line tenderness  - ROM: painful active ROM  - special tests:  (-) Saul  (-) varus at 0 and 30 degrees flexion  (-) valgus at 0 and 30 degrees flexion    Neuro  - no sensory or motor deficit, grossly normal coordination, normal muscle tone             ASSESSMENT & PLAN  Ms. Loaiza is a 83 year old female who presents to clinic today with right ankle pain and right knee osteoarthritis.    I reviewed her x-ray in the room with her, this is from February 16 and shows no obvious acute issues.  Given her level of pain and lack of improvement I am ordering an MRI.  I also gave her an ankle brace to help with stability.    As for her knee I did inject her knee with corticosteroid today.  We are starting the process for hyaluronic acid approval as well.  She can return to clinic to have this done when her knee is bothering her again.    It was a pleasure seeing Azul today.    Neal Oviedo DO, [facility]  Primary Care Sports Medicine      This note was constructed using Dragon dictation software, please excuse any minor errors in spelling, grammar, or syntax.      Large Joint Injection/Arthocentesis: R knee joint    Date/Time: 3/1/2024 12:08 PM    Performed by: Neal Oviedo DO  Authorized by: Neal Oviedo DO    Indications:  Pain  Needle Size:  22 G  Guidance: landmark guided    Approach:  Lateral  Location:  Knee      Medications:  40 mg  triamcinolone 40 MG/ML  Outcome:  Tolerated well, no immediate complications  Procedure discussed: discussed risks, benefits, and alternatives    Consent Given by:  Patient  Timeout: timeout called immediately prior to procedure    Prep: patient was prepped and draped in usual sterile fashion       PROCEDURE    Knee Injection - Intraarticular  The patient was informed of the risks and the benefits of the procedure and a written consent was signed.  The patient's right knee was prepped with chlorhexidine in sterile fashion.   40 mg of triamcinolone suspension was drawn up into a 5 mL syringe with 4 mL of 1% lidocaine.  Injection was performed using substerile technique.  A 1.5-inch 22-gauge needle was used to enter the lateral aspect of the knee.  Injection performed successfully without difficulty.  There were no complications. The patient tolerated the procedure well. There was negligible bleeding.

## 2024-03-04 RX ORDER — TRIAMCINOLONE ACETONIDE 40 MG/ML
40 INJECTION, SUSPENSION INTRA-ARTICULAR; INTRAMUSCULAR
Status: SHIPPED | OUTPATIENT
Start: 2024-03-01

## 2024-03-05 NOTE — TELEPHONE ENCOUNTER
3/5 Called and left voicemail, provided phone number [phone] to schedule a follow up with Dr. Oviedo.     [facility]   Orthopedics, Podiatry, Sports Medicine, Ent ,Eye , Audiology, Adult Endocrine & Diabetes, Nutrition & Medication Therapy Management Specialties   [facility]

## 2024-03-06 ENCOUNTER — OFFICE VISIT (OUTPATIENT)
Dept: FAMILY MEDICINE | Facility: CLINIC | Age: 84
End: 2024-03-06
Payer: COMMERCIAL

## 2024-03-06 ENCOUNTER — PATIENT OUTREACH (OUTPATIENT)
Dept: CARE COORDINATION | Facility: CLINIC | Age: 84
End: 2024-03-06

## 2024-03-06 VITALS
HEART RATE: 66 BPM | SYSTOLIC BLOOD PRESSURE: 146 MMHG | WEIGHT: 193.4 LBS | BODY MASS INDEX: 35.95 KG/M2 | OXYGEN SATURATION: 92 % | DIASTOLIC BLOOD PRESSURE: 83 MMHG

## 2024-03-06 DIAGNOSIS — G60.3 IDIOPATHIC PROGRESSIVE POLYNEUROPATHY: Primary | Chronic | ICD-10-CM

## 2024-03-06 DIAGNOSIS — G47.33 OBSTRUCTIVE SLEEP APNEA: Primary | Chronic | ICD-10-CM

## 2024-03-06 DIAGNOSIS — I10 BENIGN ESSENTIAL HYPERTENSION: ICD-10-CM

## 2024-03-06 PROCEDURE — 99213 OFFICE O/P EST LOW 20 MIN: CPT | Performed by: FAMILY MEDICINE

## 2024-03-06 RX ORDER — RESPIRATORY SYNCYTIAL VIRUS VACCINE 120MCG/0.5
0.5 KIT INTRAMUSCULAR ONCE
Qty: 1 EACH | Refills: 0 | Status: CANCELLED | OUTPATIENT
Start: 2024-03-06 | End: 2024-03-06

## 2024-03-06 ASSESSMENT — ACTIVITIES OF DAILY LIVING (ADL): DEPENDENT_IADLS:: INDEPENDENT

## 2024-03-06 NOTE — PROGRESS NOTES
"  Assessment & Plan     Obstructive sleep apnea  She'll use cpap when gets it back    Benign essential hypertension  Monitor at home/HHN will help    [place] will see me when back in town, unclear when that will be      25 minutes spent by me on the date of the encounter doing chart review, history and exam, documentation and further activities per the note      BMI  Estimated body mass index is 35.95 kg/m  as calculated from the following:    Height as of 2/29/24: 1.562 m (5' 1.5\").    Weight as of this encounter: 87.7 kg (193 lb 6.4 oz).   Weight management plan: Discussed healthy diet and exercise guidelines        No follow-ups on file.    Conor Liang is a 83 year old, presenting for the following health issues:  Follow Up (Pt here for follow up)      3/6/2024     2:54 PM   Additional Questions   Roomed by SHEEBA, EMT     History of Present Illness       Hypertension: She presents for follow up of hypertension.  She does not check blood pressure  regularly outside of the clinic. Outside blood pressures have been over 140/90. She follows a low salt diet.     She eats 2-3 servings of fruits and vegetables daily.She consumes 0 sweetened beverage(s) daily.She exercises with enough effort to increase her heart rate 9 or less minutes per day.  She exercises with enough effort to increase her heart rate 3 or less days per week.   She is taking medications regularly.   Here w/ dtr  No new c/o  Reviewed studies  Doing MR ankle as very painful, saw Ortho  In visit we introduce care coordinator, they'll collaborate w/ dtr too on finding HHN  BP better after rest  Cannot get MARCELO device for a while, under repair, knows routine use will help BP  Past Medical History:   Diagnosis Date    Cataracts, bilateral     Dyslipidemia     HTN (hypertension)     Statin intolerance 10/22/2015     No past surgical history on file.  Current Outpatient Medications   Medication    aspirin 81 MG tablet    Calcium Citrate-Vitamin " D (CITRACAL + D PO)    citalopram (CELEXA) 20 MG tablet    citalopram (CELEXA) 20 MG tablet    COPPER PO    cyabnocobalamin (VITAMIN B-12) 2500 MCG sublingual tablet    econazole nitrate 1 % cream    ezetimibe (ZETIA) 10 MG tablet    ezetimibe (ZETIA) 10 MG tablet    hydrochlorothiazide (HYDRODIURIL) 25 MG tablet    losartan (COZAAR) 100 MG tablet    metoprolol succinate ER (TOPROL XL) 50 MG 24 hr tablet    minoxidil (ROGAINE) 2 % external solution    Multiple Vitamins-Minerals (CENTRUM SILVER PO)    Omega-3 Fatty Acids (OMEGA 3 PO)    omeprazole (PRILOSEC) 20 MG CR capsule    omeprazole (PRILOSEC) 20 MG DR capsule    order for DME    ORDER FOR DME    polyethylene glycol 0.4%- propylene glycol 0.3% (SYSTANE ULTRA) 0.4-0.3 % SOLN ophthalmic solution    psyllium (METAMUCIL SMOOTH TEXTURE) 63 % POWD    RaNITidine HCl (ZANTAC PO)    SALICYLIC ACID 40%, UREA 20% IN PETROLEUM, FV COMPOUNDED,    CREAM    UNABLE TO FIND    Urea 40 % CREA     Current Facility-Administered Medications   Medication    triamcinolone (KENALOG-40) injection 40 mg     Allergies   Allergen Reactions    Gabapentin Nausea    Ibuprofen Sodium GI Disturbance    Simvastatin Cramps     Other reaction(s): Other  Groin and leg cramps               Objective    /83 (BP Location: Right arm, Patient Position: Sitting, Cuff Size: Adult Large)   Pulse 66   Wt 87.7 kg (193 lb 6.4 oz)   SpO2 92%   BMI 35.95 kg/m    Body mass index is 35.95 kg/m .  Physical Exam   GENERAL: alert and no distress  MS: no gross musculoskeletal defects noted, no edema        Signed Electronically by: Babatunde Mueller MD

## 2024-03-06 NOTE — PROGRESS NOTES
Clinic Care Coordination Contact  Clinic Care Coordination Contact  OUTREACH    Referral Information:  Referral Source: PCP    Primary Diagnosis: Injury/Fall    Chief Complaint   Patient presents with    Clinic Care Coordination - Initial        Universal Utilization: 39.1% Admission or ED Risk   Clinic Utilization  Difficulty keeping appointments:: No  Compliance Concerns: No  No-Show Concerns: No  No PCP office visit in Past Year: No  Utilization      No Show Count (past year)  1             ED Visits  0             Hospital Admissions  0                    Current as of: 3/6/2024  3:04 AM                Clinical Concerns:  Current Medical Concerns:  RN met with patient and daughter in clinic. Patient currently has no medical concerns, but lives alone and could use help identifying resources for at home. Patient has an unreliable phone, and no option to get support when there is an emergency. RN is working towards getting patient set up with Home Care, Life Alert, and patient's daughter will be working towards getting patient set up with a cell phone.     Current Behavioral Concerns: See as above.     Education Provided to patient: See as above    Pain  Pain (GOAL):: No  Health Maintenance Reviewed: Due/Overdue  Health Maintenance Due   Topic Date Due    ADVANCE CARE PLANNING  Never done    RSV VACCINE (Pregnancy & 60+) (1 - 1-dose 60+ series) Never done    ZOSTER IMMUNIZATION (2 of 3) 06/03/2011    COLORECTAL CANCER SCREENING  02/28/2018    MEDICARE ANNUAL WELLNESS VISIT  11/16/2019    MAMMO SCREENING  11/23/2019       Clinical Pathway: None    Medication Management:  Medication review status: Medications not reviewed during this outreach, met with patient in clinic     Functional Status:  Dependent ADLs:: Independent  Dependent IADLs:: Independent  Bed or wheelchair confined:: No  Mobility Status: Independent  Fallen 2 or more times in the past year?: Yes  Any fall with injury in the past year?: Yes    Living  Situation:  Current living arrangement:: I live alone  Type of residence:: Private home - stairs    Lifestyle & Psychosocial Needs:    Social Determinants of Health     Food Insecurity: Not on file   Depression: Not at risk (3/6/2024)    PHQ-2     PHQ-2 Score: 0   Housing Stability: Not on file   Tobacco Use: Low Risk  (3/6/2024)    Patient History     Smoking Tobacco Use: Never     Smokeless Tobacco Use: Never     Passive Exposure: Never   Financial Resource Strain: Not on file   Alcohol Use: Not on file   Transportation Needs: Not on file   Physical Activity: Not on file   Interpersonal Safety: Not on file   Stress: Not on file   Social Connections: Not on file     Diet:: Regular  Inadequate nutrition (GOAL):: No  Tube Feeding: No  Inadequate activity/exercise (GOAL):: No  Significant changes in sleep pattern (GOAL): No  Transportation means:: Regular car     Hindu or spiritual beliefs that impact treatment:: No  Mental health DX:: No  Mental health management concern (GOAL):: No  Chemical Dependency Status: No Current Concerns  Informal Support system:: Family, Neighbors, Children        Resources and Interventions:  Current Resources:      Community Resources: Financial/Insurance  Supplies Currently Used at Home: None  Equipment Currently Used at Home: none  Employment Status: retired         Advance Care Plan/Directive  Advanced Care Plans/Directives on file:: No  Discussed with patient/caregiver:: Declined Further Information    Referrals Placed: None     Care Plan:  Care Plan: Help At Home       Problem: Insufficient In-home support       Goal: Establish adequate home support       Start Date: 3/6/2024    This Visit's Progress: On track    Priority: High    Note:     Barriers: patient currently lives at home alone  Strengths: patient has a cell phone, daughter calls for welfare checks if patient doesn't answer phone   Patient expressed understanding of goal: yes   Action steps to achieve this goal:  1. I  will work towards getting a life alert or safe alert button that I can use if I fall and am alone at home.   2. I will work with my RN care coordinator, Brigette, to get home care set up.   3. I will continue to work on getting a reliable phone source at home so that I can communicate and call for help.                                 Patient/Caregiver understanding: Patient states understanding. Patient has no further questions or concerns regarding above assessment. Patient is aware to call the clinic and reach out to the care team with any further questions.       Outreach Frequency: monthly, more frequently as needed  Future Appointments                In 1 week [facility] [place]    In 6 months Marisela Valadez, PA [facility]            Plan: RN to work towards finding HC for patient in either the [facility], or [place] area. RN to follow-up with patient regarding this.     BRIGETTE MERRITT RN on 3/6/2024 at 4:22 PM    Addendum:  No HC orders in chart for patient. HC order pended to PCP to sign.     BRIGETTE MERRITT RN on 3/7/2024 at 10:01 AM    Addendum:  HC order signed, epic faxed to [facility] , [facility] , and [facility].     BRIGETTE MERRITT RN on 3/8/2024 at 8:18 AM

## 2024-03-06 NOTE — LETTER
PRIMARY CARE CARE COORDINATION   [facility]  [address] [place] [id number]    2024    Jessica Loaiza  [address] [facility] [address]    Hi! Please feel free to call me directly with any updates to home care acceptance. Patient lives in [place]- if you have any better leads for home care agencies in that area please let me know. Thank you!  Brigette RN Care Manager  Primary Care Clinic   Phone: [phone]  Fax: [phone]          Patient Information       Patient Name  Jessica Loaiza ([id number]) Legal Sex  Female   1940         Patient Demographics       Address  [address] [id number] Phone  [phone] (Home) *Preferred*  [phone] (Mobile)         Basic Information       Date Of Birth  1940 Legal Sex  Female Race  White Ethnic Group  Choose not to answer Preferred Language  English       PCP and Center    Primary Care Provider  Phone Center     Babatunde Mueller [phone] [address] [place], [place] [id number]           Documentation of Face to Face and Certification for Home Health Services     I attest that I saw or will see Jessica Loaiza on this date:  3/6/2024     This encounter with the patient was in whole, or in part, for medical condition, which is the primary reason for Home Health Care:       Patient Active Problem List   Diagnosis    Female stress incontinence    Idiopathic progressive polyneuropathy    Callus of foot    ST (skin tag)    Inflamed seborrheic keratosis    Tinea pedis    Bladder incontinence    Dermatitis    Blister    Obstructive sleep apnea    Disturbance in sleep behavior    Sleep related hypoventilation/hypoxemia in other disease    Periodic limb movement disorder    Hyperlipidemia with target LDL less than 100    Statin intolerance      I certify that, based on my findings, the following services are medically necessary Home Health Services: Skilled Nursing  Physical Therapy  Disease management  Complex medication teaching and monitoring  Home Safety Assessment  Gait Training  Therapeutic Exercise  Range of Motion     Additional services needed: Home Health Aide       Further, I certify that my clinical findings support that this patient is homebound (i.e. absences from home require considerable and taxing effort and are for medical reasons or Cong[employer] services or infrequently or of short duration when for other reasons) related to:Patient has difficulty ambulating >100 ft  Requires assistance of another person or specialized equipment is needed     Based on the above findings, I certify that this patient is confined to the home and needs intermittent skilled nursing care, physical therapy and/or speech therapy. The patient is under my care, and I have initiated the establishment of the plan of care. This patient will be followed by a physician who will periodically review the plan of care.        Physician/Provider to provide follow up care: Provider to follow patient: THAIS MUELLER [[id number]]        Please be aware that coverage of these services is subject to the terms and limitations of your health insurance plan.  Call member services at your health plan with any benefit or coverage questions.  _______________________________________________________________________  Authorized by:  Thais Mueller MD       PLEASE EVALUATE AND TREAT (Evaluation timeline is 24 - 48 hrs. Please call if there is need for a variance to this timeline).     Medications:         Current Outpatient Medications   Medication Sig Dispense Refill    aspirin 81 MG tablet Take 1 tablet by mouth daily.        Calcium Citrate-Vitamin D (CITRACAL + D PO) Take 1 tablet by mouth daily         citalopram (CELEXA) 20 MG tablet Take 1 tablet (20 mg) by mouth daily 90 tablet 3    citalopram (CELEXA) 20 MG tablet Take 1 tablet (20 mg) by mouth daily 30 tablet 0    COPPER PO Take 1 tablet by mouth as needed         cyabnocobalamin (VITAMIN B-12) 2500 MCG sublingual tablet Take 1 tablet by mouth daily Taking daily.        econazole nitrate 1 % cream Apply topically 2 times daily 85 g 5    ezetimibe (ZETIA) 10 MG tablet Take 1 tablet (10 mg) by mouth daily 90 tablet 3    ezetimibe (ZETIA) 10 MG tablet Take 1 tablet (10 mg) by mouth daily 90 tablet 3    hydrochlorothiazide (HYDRODIURIL) 25 MG tablet Take 1 tablet (25 mg) by mouth daily 90 tablet 3    losartan (COZAAR) 100 MG tablet Take 1 tablet (100 mg) by mouth daily 90 tablet 3    metoprolol succinate ER (TOPROL XL) 50 MG 24 hr tablet Take 1 tablet (50 mg) by mouth daily 90 tablet 3    minoxidil (ROGAINE) 2 % external solution Apply topically daily         Multiple Vitamins-Minerals (CENTRUM SILVER PO) Take  by mouth.        Omega-3 Fatty Acids (OMEGA 3 PO) Take 1 capsule by mouth daily         omeprazole (PRILOSEC) 20 MG CR capsule Take 1 tablet (20 mg) by mouth daily Take 30-60 minutes before a meal. (Patient taking differently: Take 1 tablet (20 mg) by mouth daily Take 30-60 minutes before a meal.     Taking every other day. Alternates with Zantac.) 90 capsule 1    omeprazole (PRILOSEC) 20 MG DR capsule Take 1 capsule (20 mg) by mouth daily 90 capsule 3    order for DME Equipment being ordered: Right wrist velcro brace with a thumb 1 Device 0    ORDER FOR DME Use your CPAP device as directed by your provider.        polyethylene glycol 0.4%- propylene glycol 0.3% (SYSTANE ULTRA) 0.4-0.3 % SOLN ophthalmic solution Place 1 drop into both eyes 2 times daily        psyllium (METAMUCIL SMOOTH TEXTURE) 63 % POWD Take 1 Tablespoonful by mouth 3 times daily Mix in 8 ounces of water 3 Bottle 3    RaNITidine HCl (ZANTAC PO) Alternates with Prilosec.        SALICYLIC ACID 40%, UREA 20% IN PETROLEUM, FV COMPOUNDED,    CREAM Twice daily 120 g 3    UNABLE TO FIND daily MEDICATION NAME: Charles Support        Urea 40 % CREA Externally apply  topically. Apply to feet daily 60 g 3       Problems:      Patient Active Problem List   Diagnosis    Female stress incontinence    Idiopathic progressive polyneuropathy    Callus of foot    ST (skin tag)    Inflamed seborrheic keratosis    Tinea pedis    Bladder incontinence    Dermatitis    Blister    Obstructive sleep apnea    Disturbance in sleep behavior    Sleep related hypoventilation/hypoxemia in other disease    Periodic limb movement disorder    Hyperlipidemia with target LDL less than 100    Statin intolerance      Diet:  None        Code Status:    Code Status: Not on file     Allergies:  Gabapentin, Ibuprofen sodium, and Simvastatin      Primary Visit Coverage    Payer Plan Sponsor Code Group Number Group Name   UCARE UCARE MEDICARE [id number]       Primary Visit Coverage Subscriber    ID Name SS Address   [id number] LINA ALFONSO [id number] [place] [address]     Order  Home Care Referral [[id number]] (Order [id number])  Collection Information    Exam Details    Performed Procedure Technologist Supporting Staff Performing Physician   Home Care Referral            Appointment Date/Status Modality Department                       Order Questions    Question Answer   Reason for Referral: Skilled Nursing   Note: Must select at least one of Skilled Nursing, Physical Therapy, and/or Speech Therapy in addition to any other services.    Physical Therapy   Note: Must select at least one of Skilled Nursing, Physical Therapy, and/or Speech Therapy in addition to any other services.   Physical Therapy Eval and Treat for: Home Safety Assessment    Gait Training    Therapeutic Exercise    Range of Motion   Skilled Nursing Eval and Treat for: Disease management    Complex medication teaching and monitoring   Additional Services Needed: Home Health Aide   Is the patient homebound? Yes   Homebound Status (describe the functional limitations that support this patient is confined to his/her home. Medicaid recipients are not required to  be homebound.): Patient has difficulty ambulating >100 ft    Requires assistance of another person or specialized equipment is needed

## 2024-03-07 NOTE — TELEPHONE ENCOUNTER
3/7 Called and left voicemail, provided phone number [phone] to schedule a follow up with Dr. Oviedo.     [facility]   Orthopedics, Podiatry, Sports Medicine, Ent ,Eye , Audiology, Adult Endocrine & Diabetes, Nutrition & Medication Therapy Management Specialties   [facility]

## 2024-03-08 ENCOUNTER — TELEPHONE (OUTPATIENT)
Dept: FAMILY MEDICINE | Facility: CLINIC | Age: 84
End: 2024-03-08

## 2024-03-08 NOTE — TELEPHONE ENCOUNTER
[facility] Call Center    Phone Message    May a detailed message be left on voicemail: yes     Reason for Call: Other: Received a referral for home health services for patient, Barrera from [facility] has a couple questions and requesting a call back to discuss further.     Action Taken: Message routed to:  [facility] P[facility]    Travel Screening: Not Applicable

## 2024-03-08 NOTE — TELEPHONE ENCOUNTER
[facility] Call Center    Phone Message    May a detailed message be left on voicemail: yes     Reason for Call: Other: [facility] called and stated they cannot accept the referral for homecare

## 2024-03-08 NOTE — PROGRESS NOTES
RN received return call from [facility], and returned call back to [facility]. They reported being able to accept patient. They are able to do medication management for 3 weeks with Medicare, but will assess for other nursing need at first visit. They will get a chart going to contact patient. RN called and informed patient of HC acceptance. Routed to PCP as well.     CLIFF MERRITT RN on 3/8/2024 at 9:12 AM

## 2024-03-08 NOTE — TELEPHONE ENCOUNTER
Left a detailed message to Barrera regarding the home care referral :   Skilled nursing, PT/OT, HHA.

## 2024-03-11 NOTE — TELEPHONE ENCOUNTER
I called back to [facility] and the representative told me Barrera was covering Eb and she transferred the call to [facility]. I left a message to Eb that she could call be back with details.

## 2024-03-13 ENCOUNTER — HOSPITAL ENCOUNTER (OUTPATIENT)
Dept: MRI IMAGING | Facility: CLINIC | Age: 84
Discharge: HOME | End: 2024-03-13
Attending: FAMILY MEDICINE | Admitting: FAMILY MEDICINE
Payer: COMMERCIAL

## 2024-03-13 DIAGNOSIS — M25.571 PAIN IN JOINT INVOLVING ANKLE AND FOOT, RIGHT: ICD-10-CM

## 2024-03-13 PROCEDURE — 73721 MRI JNT OF LWR EXTRE W/O DYE: CPT | Mod: RT

## 2024-03-13 PROCEDURE — 73721 MRI JNT OF LWR EXTRE W/O DYE: CPT | Mod: 26 | Performed by: RADIOLOGY

## 2024-03-14 ENCOUNTER — DOCUMENTATION ONLY (OUTPATIENT)
Dept: FAMILY MEDICINE | Facility: CLINIC | Age: 84
End: 2024-03-14
Payer: COMMERCIAL

## 2024-03-14 NOTE — PROGRESS NOTES
Type of Form Received:     Form Received (Date) 3/14/24   Form Filled out Yes, faxed 3/15   Placed in provider folder Yes

## 2024-03-18 ENCOUNTER — DOCUMENTATION ONLY (OUTPATIENT)
Dept: FAMILY MEDICINE | Facility: CLINIC | Age: 84
End: 2024-03-18
Payer: COMMERCIAL

## 2024-03-18 NOTE — PROGRESS NOTES
Type of Form Received:     Form Received (Date) 3/18/24   Form Filled out Yes 3/20/24   Placed in provider folder Yes

## 2024-03-25 ENCOUNTER — DOCUMENTATION ONLY (OUTPATIENT)
Dept: FAMILY MEDICINE | Facility: CLINIC | Age: 84
End: 2024-03-25
Payer: COMMERCIAL

## 2024-03-25 NOTE — PROGRESS NOTES
Type of Form Received:     Form Received (Date) 3/22/24   Form Filled out Yes 3/27/24   Placed in provider folder Yes

## 2024-04-02 ENCOUNTER — TELEPHONE (OUTPATIENT)
Dept: ORTHOPEDICS | Facility: CLINIC | Age: 84
End: 2024-04-02
Payer: COMMERCIAL

## 2024-04-02 NOTE — TELEPHONE ENCOUNTER
4/2 Called and left voicemail, provided phone number [phone] to schedule gel injection with dr. Oviedo.     [facility]   Orthopedics, Podiatry, Sports Medicine, Ent ,Eye , Audiology, Adult Endocrine & Diabetes, Nutrition & Medication Therapy Management Specialties   [facility]        ----- Message from EUGENIE Arora sent at 4/2/2024 10:17 AM CDT -----  Please schedule patient for gel injection with Preet Angela

## 2024-04-08 NOTE — TELEPHONE ENCOUNTER
4/8 2nd attempt.  Called and left voicemail, provided phone number [phone] to schedule gel injection with dr. Oviedo.      [facility]   Orthopedics, Podiatry, Sports Medicine, Ent ,Eye , Audiology, Adult Endocrine & Diabetes, Nutrition & Medication Therapy Management Specialties   [facility]

## 2024-04-18 NOTE — TELEPHONE ENCOUNTER
Letter sent with last labs, per patient request. Lisa Wu LPN 5/23/2019 2:08 PM    
[facility] Call Center    Phone Message    May a detailed message be left on voicemail: yes    Reason for Call: Other: Per call from PT is requesting a call back to discuss questions she has re: hydrochlorothiazide 12.5 MG TABS tablet. Per PT requested to take the higher dose of 20 mg.      Action Taken: Message routed to:  [facility] Primary Care  
Ok that but should see me in next few weeks then    Ok 25 mg hydrochlorothiazide/day, 30 tab no refill re edema    Watch for dizzy  
Spoke to patient who states she would like to go up to 20 mg. Of hydrochlorothiazide.  Patient had stopped taking 12.5mg due to recalls and has not been taking it since August 2018 and is not currently on the medication. Patient states she is filling up with fluid in her ankles and she can not get it off. Patient would like to go back on this medication, but at the higher dose. Lisa Wu LPN 5/23/2019 12:44 PM    
19-Apr-2024 02:05

## 2024-05-16 ENCOUNTER — PATIENT OUTREACH (OUTPATIENT)
Dept: CARE COORDINATION | Facility: CLINIC | Age: 84
End: 2024-05-16
Payer: COMMERCIAL

## 2024-05-16 DIAGNOSIS — I10 BENIGN ESSENTIAL HYPERTENSION: ICD-10-CM

## 2024-05-16 DIAGNOSIS — F32.89 OTHER DEPRESSION: ICD-10-CM

## 2024-05-16 DIAGNOSIS — R60.9 EDEMA, UNSPECIFIED TYPE: ICD-10-CM

## 2024-05-16 NOTE — PROGRESS NOTES
Clinic Care Coordination Contact  [facility]/Voicemail    Clinical Data: Care Coordinator Outreach    Outreach Documentation Number of Outreach Attempt   5/16/2024   3:50 PM 1       Left message on patient's voicemail with call back information and requested return call.    Plan: Care Coordinator will try to reach patient again in 10 business days.    CLIFF MERRITT RN on 5/16/2024 at 3:51 PM

## 2024-05-24 RX ORDER — CITALOPRAM HYDROBROMIDE 20 MG/1
20 TABLET ORAL DAILY
Qty: 90 TABLET | Refills: 3 | Status: SHIPPED | OUTPATIENT
Start: 2024-05-24

## 2024-05-24 RX ORDER — METOPROLOL SUCCINATE 50 MG/1
50 TABLET, EXTENDED RELEASE ORAL DAILY
Qty: 90 TABLET | Refills: 3 | Status: SHIPPED | OUTPATIENT
Start: 2024-05-24

## 2024-05-24 RX ORDER — HYDROCHLOROTHIAZIDE 25 MG/1
25 TABLET ORAL DAILY
Qty: 90 TABLET | Refills: 3 | Status: SHIPPED | OUTPATIENT
Start: 2024-05-24

## 2024-05-24 RX ORDER — LOSARTAN POTASSIUM 100 MG/1
100 TABLET ORAL DAILY
Qty: 90 TABLET | Refills: 3 | Status: SHIPPED | OUTPATIENT
Start: 2024-05-24

## 2024-05-24 NOTE — TELEPHONE ENCOUNTER
"Future Office Visit:      Requested Prescriptions   Pending Prescriptions Disp Refills    citalopram (CELEXA) 20 MG tablet [Pharmacy Med Name: CITALOPRAM 20MG TABLET] 90 tablet 3     Sig: TAKE 1 TABLET (20 MG) BY MOUTH DAILY       SSRIs Protocol Failed - 5/16/2024  1:00 AM        Failed - PHQ-9 score less than 5 in past 6 months     Please review last PHQ-9 score.           Passed - Medication is active on med list        Passed - Patient is age 18 or older        Passed - No active pregnancy on record        Passed - No positive pregnancy test in last 12 months        Passed - Recent (6 mo) or future (30 days) visit within the authorizing provider's specialty     Patient had office visit in the last 6 months or has a visit in the next 30 days with authorizing provider or within the authorizing provider's specialty.  See \"Patient Info\" tab in inbasket, or \"Choose Columns\" in Meds & Orders section of the refill encounter.              hydrochlorothiazide (HYDRODIURIL) 25 MG tablet [Pharmacy Med Name: HYDROCHLOROTHIAZIDE 25MG TAB] 90 tablet 3     Sig: TAKE 1 TABLET (25 MG) BY MOUTH DAILY       Diuretics (Including Combos) Protocol Failed - 5/16/2024  1:00 AM        Failed - Blood pressure under 140/90 in past 12 months     BP Readings from Last 3 Encounters:   03/06/24 (!) 146/83   02/29/24 (!) 175/84   02/16/24 (!) 176/107       No data recorded            Passed - Medication is active on med list        Passed - Medication indicated for associated diagnosis     Medication is associated with one or more of the following diagnoses:     Edema   Hypertension   Heart Failure   Meniere's Disease          Passed - Has GFR on file in past 12 months and most recent value is normal        Passed - Recent (12 mo) or future (90 days) visit within the authorizing provider's specialty     The patient must have completed an in-person or virtual visit within the past 12 months or has a future visit scheduled within the next 90 days " with the authorizing provider s specialty.  Urgent care and e-visits do not quality as an office visit for this protocol.          Passed - Patient is age 18 or older        Passed - No active pregancy on record        Passed - No positive pregnancy test in past 12 months          losartan (COZAAR) 100 MG tablet [Pharmacy Med Name: LOSARTAN 100MG TABLET] 90 tablet 3     Sig: TAKE 1 TABLET (100 MG) BY MOUTH DAILY       Angiotensin-II Receptors Failed - 5/16/2024  1:00 AM        Failed - Last blood pressure under 140/90 in past 12 months     BP Readings from Last 3 Encounters:   03/06/24 (!) 146/83   02/29/24 (!) 175/84   02/16/24 (!) 176/107       No data recorded            Passed - Medication is active on med list        Passed - Has GFR on file in past 12 months and most recent value is normal        Passed - Medication indicated for associated diagnosis     The medication is prescribed for one or more of the following conditions:    Chronic Kidney Disease (CDK)    Heart Failure (HF)    Diabetes, Nephropathy   Hypertension    Coronary Artery Disease (CAD)   Raynaud's Disease          Passed - Recent (12 mo) or future (90days) visit within the authorizing provider's specialty     The patient must have completed an in-person or virtual visit within the past 12 months or has a future visit scheduled within the next 90 days with the authorizing provider s specialty.  Urgent care and e-visits do not quality as an office visit for this protocol.          Passed - Patient is age 18 or older        Passed - No active pregnancy on record        Passed - Normal serum potassium on file in past 12 months     Recent Labs   Lab Test 02/16/24  1653   POTASSIUM 4.4                    Passed - No positive pregnancy test in past 12 months          metoprolol succinate ER (TOPROL XL) 50 MG 24 hr tablet [Pharmacy Med Name: METOPROLOL SUCC 50MG ER TABLET] 90 tablet 3     Sig: TAKE 1 TABLET (50 MG) BY MOUTH DAILY       Beta-Blockers  Protocol Failed - 5/16/2024  1:00 AM        Failed - Blood pressure under 140/90 in past 12 months     BP Readings from Last 3 Encounters:   03/06/24 (!) 146/83   02/29/24 (!) 175/84   02/16/24 (!) 176/107       No data recorded            Passed - Patient is age 6 or older        Passed - Medication is active on med list        Passed - Medication indicated for associated diagnosis     Medication is associated with one or more of the following diagnoses:     Hypertension (HTN)   Atrial fibrillation/flutter   Angina   ASCVD   Migraine   Heart Failure   Tremor   Anxiety   Ocular hypertension   Glaucoma          Passed - Recent (12 mo) or future (90 days) visit within the authorizing provider's specialty     The patient must have completed an in-person or virtual visit within the past 12 months or has a future visit scheduled within the next 90 days with the authorizing provider s specialty.  Urgent care and e-visits do not quality as an office visit for this protocol.               Unable to fill per RN protocol, will forward to provider for review.       Ashely Flower RN on 5/23/2024 at 10:21 PM

## 2024-06-18 ENCOUNTER — PATIENT OUTREACH (OUTPATIENT)
Dept: CARE COORDINATION | Facility: CLINIC | Age: 84
End: 2024-06-18
Payer: COMMERCIAL

## 2024-06-18 NOTE — PROGRESS NOTES
Clinic Care Coordination Contact  Follow Up Progress Note      Assessment: RN called and spoke with patient. Pt reports things are going well, has no concerns. Pt reports that she never ended up accepting the home care that we found for her, and states that she felt she was doing well without it and that someone else could take the home care. Pt reports she is doing well with no concerns. RN informed would call patient again in 1 month to check in. Pt placed in maintenance status as no need for HC and no identifiable goals currently.     Care Gaps:    Health Maintenance Due   Topic Date Due    ADVANCE CARE PLANNING  Never done    RSV VACCINE (Pregnancy & 60+) (1 - 1-dose 60+ series) Never done    ZOSTER IMMUNIZATION (2 of 3) 06/03/2011    COLORECTAL CANCER SCREENING  02/28/2018    MEDICARE ANNUAL WELLNESS VISIT  11/16/2019    MAMMO SCREENING  11/23/2019    COVID-19 Vaccine (6 - 2023-24 season) 03/07/2024    LIPID  05/12/2024       Currently there are no Care Gaps.    Care Plans  Care Plan: Help At Home       Problem: Insufficient In-home support                     Intervention/Education provided during outreach: Discussed patients plan of care. CC RN asked open ended questions, provided support, resources, and encouragement as needed. Patient will reach out to care team sooner than planned with new questions or concerns.      Plan:   Care Coordinator will follow up in 1 month.     CLIFF MERRITT RN on 6/18/2024 at 2:15 PM

## 2024-07-17 ENCOUNTER — TRANSFERRED RECORDS (OUTPATIENT)
Dept: HEALTH INFORMATION MANAGEMENT | Facility: CLINIC | Age: 84
End: 2024-07-17
Payer: COMMERCIAL

## 2024-07-18 ENCOUNTER — PATIENT OUTREACH (OUTPATIENT)
Dept: CARE COORDINATION | Facility: CLINIC | Age: 84
End: 2024-07-18
Payer: COMMERCIAL

## 2024-07-18 NOTE — PROGRESS NOTES
Clinic Care Coordination Contact  [facility]/Voicemail    Clinical Data: Care Coordinator Outreach    Outreach Documentation Number of Outreach Attempt   7/18/2024  10:38 AM 1       Left message on patient's voicemail with call back information and requested return call. RN called and LVM for patient. RN informed patient that if no care coordination needs, RN will likely no longer reach out to patient monthly. RN encouraged patient if we can help with anything, to call us back at the clinic. RN will check in 1 month- if has not heard from patient will graduate from program at that time.     Plan: Care Coordinator will try to reach patient again in 1 month.    CLIFF MERRITT RN on 7/18/2024 at 10:39 AM

## 2024-08-15 ENCOUNTER — PATIENT OUTREACH (OUTPATIENT)
Dept: CARE COORDINATION | Facility: CLINIC | Age: 84
End: 2024-08-15
Payer: COMMERCIAL

## 2024-08-15 NOTE — PROGRESS NOTES
Clinic Care Coordination Contact    Assessment: RN left VM at previous outreach and encouraged patient to call with new care coordination needs. RN received no call from patient with any concerns. Patient has continued to follow the plan of care and assessment is negative for any new needs or concerns.    Enrollment status: Graduated.      Plan: No further outreaches at this time.  Patient will continue to follow the plan of care.  If new needs arise a new Care Coordination referral may be placed.     CLIFF MERRITT RN on 8/15/2024 at 12:31 PM

## 2024-09-03 ENCOUNTER — VIRTUAL VISIT (OUTPATIENT)
Dept: SLEEP MEDICINE | Facility: CLINIC | Age: 84
End: 2024-09-03
Payer: COMMERCIAL

## 2024-09-03 VITALS — WEIGHT: 204 LBS | BODY MASS INDEX: 38.51 KG/M2 | HEIGHT: 61 IN

## 2024-09-03 DIAGNOSIS — G47.33 OSA (OBSTRUCTIVE SLEEP APNEA): Primary | ICD-10-CM

## 2024-09-03 PROCEDURE — 99443 PR PHYSICIAN TELEPHONE EVALUATION 21-30 MIN: CPT | Mod: 93 | Performed by: PHYSICIAN ASSISTANT

## 2024-09-03 ASSESSMENT — PAIN SCALES - GENERAL: PAINLEVEL: SEVERE PAIN (6)

## 2024-09-03 NOTE — PROGRESS NOTES
Virtual Visit Details    Type of service:  Telephone Visit   Phone call duration: 21 minutes   Originating Location (pt. Location): Home    Distant Location (provider location):  On-site    Sleep Apnea - Follow-up Visit:    Impression/Plan:    Severe Sleep apnea. She reports tolerating PAP well. Daytime symptoms are improved.   Continue current CPAP treatment.   Comprehensive DME order placed    Jessica Loaiza will follow up in about 1 year or sooner     22 minutes spent on day of encounter doing chart review,  history and exam, counseling, coordinating plan of care, documentation and further activities as noted above.       Marisela Valadez PA-C  Sleep Medicine     History of Present Illness:  Chief Complaint   Patient presents with    RECHECK       Jessica Loaiza presents for follow-up of their severe sleep apnea, managed with CPAP.     She initially presented with loud snoring with associated snort arousals for many years, non restorative sleep, dry mouth in the morning and acid reflux at night.    Polysomnogram completed 1/15/14 (186#)-AHI 32, RDI 35, lowest oxygen saturation was 77, Optimum CPAP not achieved. Patient sent home on auto-CPAP 5-15 cm/H20.   CPAP DME:  [facility]    Do you use a CPAP Machine at home:  yes  Overall, on a scale of 0-10 how would you rate your CPAP (0 poor, 10 great):  10    What type of mask do you use:  nasal pillow  Is your mask comfortable:  yes  If not, why:      Is your mask leaking:  no  If yes, where do you feel it:    How many night per week does the mask leak (0-7):      Do you notice snoring with mask on:  no  Do you notice gasping arousals with mask on:  no  Are you having significant oral or nasal dryness:  no  Is the pressure setting comfortable:  yes  If not, why:      What is your typical bedtime:  12-2 am  How long does it take you to go to sleep on PAP therapy:  5 minutes  What time do you typically get out of bed for the day:    How many hours on average per night  are you using PAP therapy:    How many hours are you sleeping per night:    Do you feel well rested in the morning:      CPAP download is not available.     EPWORTH SLEEPINESS SCALE         9/3/2024     2:50 PM    [place] Sleepiness Scale ( PELON Chacon  [id number]<br>ESS - [place]/English - Final version - 21 Nov 07 - [facility] [place].)   Sitting and reading High chance of dozing   Watching TV High chance of dozing   Sitting, inactive in a public place (e.g. a theatre or a meeting) Moderate chance of dozing   As a passenger in a car for an hour without a break Would never doze   Lying down to rest in the afternoon when circumstances permit Slight chance of dozing   Sitting and talking to someone Slight chance of dozing   Sitting quietly after a lunch without alcohol Moderate chance of dozing   In a car, while stopped for a few minutes in traffic Would never doze   [place]chi Score (MC) 12   [place]chi Score (Sleep) 12       INSOMNIA SEVERITY INDEX (TITO)          9/3/2024     2:54 PM   Insomnia Severity Index (TITO)   Difficulty falling asleep 2   Difficulty staying asleep 2   Problems waking up too early 0   How SATISFIED/DISSATISFIED are you with your CURRENT sleep pattern? 3   How NOTICEABLE to others do you think your sleep problem is in terms of impairing the quality of your life? 3   How WORRIED/DISTRESSED are you about your current sleep problem? 2   To what extent do you consider your sleep problem to INTERFERE with your daily functioning (e.g. daytime fatigue, mood, ability to function at work/daily chores, concentration, memory, mood, etc.) CURRENTLY? 4   TITO Total Score 16       Guidelines for Scoring/Interpretation:  Total score categories:  0-7 = No clinically significant insomnia   8-14 = Subthreshold insomnia   15-21 = Clinical insomnia (moderate severity)  22-28 = Clinical insomnia (severe)  Used via courtesy of www.[employer].va.gov with permission from Alfredo Pacheco PhD., [facility]        Past  "medical/surgical history, family history, social history, medications and allergies were reviewed.        Problem List:  Patient Active Problem List    Diagnosis Date Noted    Statin intolerance 10/22/2015     Priority: Medium    Hyperlipidemia with target LDL less than 100 06/05/2014     Priority: Medium     Diagnosis updated by automated process. Provider to review and confirm.      Obstructive sleep apnea 01/15/2014     Priority: Medium     Polysomnogram completed 1/15/14 (186#)-AHI 32, RDI 35, lowest oxygen saturation was 77, Optimum CPAP not achieved. Patient sent home on auto-CPAP 5-15 cm/H20      Disturbance in sleep behavior 01/15/2014     Priority: Medium     Polysomnogram completed 1/15/14    Problem list name updated by automated process. Provider to review      Sleep related hypoventilation/hypoxemia in other disease 01/15/2014     Priority: Medium     Polysomnogram completed 1/15/14    Problem list name updated by automated process. 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

## 2024-09-03 NOTE — NURSING NOTE
Current patient location: [address] [place], [place] [id number]    Is the patient currently in the state of [place]? YES    Visit mode:VIDEO    If the visit is dropped, the patient can be reconnected by: TELEPHONE VISIT: Phone number: [phone]    Will anyone else be joining the visit? NO  (If patient encounters technical issues they should call [phone])    How would you like to obtain your AVS? MyChart    Are changes needed to the allergy or medication list? No    Are refills needed on medications prescribed by this physician? NO    Rooming Documentation:  Questionnaire(s) completed      Reason for visit: RECHECK    Marisol KAYEF

## 2024-09-03 NOTE — PATIENT INSTRUCTIONS
Your Body mass index is 38.23 kg/m .  Weight management is a personal decision.  If you are interested in exploring weight loss strategies, the following discussion covers the approaches that may be successful. Body mass index (BMI) is one way to tell whether you are at a healthy weight, overweight, or obese. It measures your weight in relation to your height.  A BMI of 18.5 to 24.9 is in the healthy range. A person with a BMI of 25 to 29.9 is considered overweight, and someone with a BMI of 30 or greater is considered obese. More than two-thirds of American adults are considered overweight or obese.  Being overweight or obese increases the risk for further weight gain. Excess weight may lead to heart disease and diabetes.  Creating and following plans for healthy eating and physical activity may help you improve your health.  Weight control is part of healthy lifestyle and includes exercise, emotional health, and healthy eating habits. Careful eating habits lifelong are the mainstay of weight control. Though there are significant health benefits from weight loss, long-term weight loss with diet alone may be very difficult to achieve- studies show long-term success with dietary management in less than 10% of people. Attaining a healthy weight may be especially difficult to achieve in those with severe obesity. In some cases, medications, devices and surgical management might be considered.  What can you do?  If you are overweight or obese and are interested in methods for weight loss, you should discuss this with your provider.   Consider reducing daily calorie intake by 500 calories.   Keep a food journal.   Avoiding skipping meals, consider cutting portions instead.    Diet combined with exercise helps maintain muscle while optimizing fat loss. Strength training is particularly important for building and maintaining muscle mass. Exercise helps reduce stress, increase energy, and improves fitness. Increasing  exercise without diet control, however, may not burn enough calories to loose weight.     Start walking three days a week 10-20 minutes at a time  Work towards walking thirty minutes five days a week   Eventually, increase the speed of your walking for 1-2 minutes at time    In addition, we recommend that you review healthy lifestyles and methods for weight loss available through the National Institutes of Health patient information sites:  http://win.niddk.nih.gov/publications/index.htm    And look into health and wellness programs that may be available through your health insurance provider, employer, local community center, or [employer] club.

## 2025-02-24 DIAGNOSIS — E78.5 HYPERLIPIDEMIA WITH TARGET LDL LESS THAN 100: ICD-10-CM

## 2025-03-03 RX ORDER — EZETIMIBE 10 MG/1
10 TABLET ORAL DAILY
Qty: 90 TABLET | Refills: 0 | Status: SHIPPED | OUTPATIENT
Start: 2025-03-03

## 2025-04-30 NOTE — PROGRESS NOTES
[facility]  Primary Care Center   Babatunde Mueller MD  11/16/2018      Chief Complaint:   Physical and Medication Question       History of Present Illness:   Jessica Loaiza is a 77 year old female with a history of idiopathic polyneuropathy, obstructive sleep apnea, and hyperlipidemia who presents for several concerns. She was going to [place] into [place] but it had to be torn down and she is living with her daughter, which can be stressful.     She was waking up with her third finger on bilateral hands that she had to straighten out with her other fingers. She did have trigger thumbs in the past.     She is getting an injection in her right knee. She applies lidocaine to the area daily because it is painful.  cortisone injections have not worked for her in the past.     Her neck is tender on the lateral anterior area. She has a cough and sore throat every morning. She thinks this is caused by her CPAP. She gave up on seeing the sleep clinic because the line of questioning was repetitive.     She is involved in a recall on hydrochlorothiazide because the medications were actually Spironolactone. She was experiencing pain in three points across her upper chest from her left clavicle to the right. She did not restart hydrochlorothiazide. After stopping the mixed up medication she no longer felt these pains. She does not check her blood pressure at home. She is on Losartan and Metoprolol. There is some swelling in her ankles but she is able to put her shoes on. She has been eating more sweets and ate candy on Halloween.      Does not drink milk but takes a calcium supplement twice daily. She has been taking omeprazole daily.     Celexa helps the neuropathy in her feet. 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 She has been dealing with this for a long time. She'll consider surgery but is not interested at this time.     Healthcare/Maintenance topics discussed:  Mammogram (females age 40 - 75): yearly or every  2 years? - Recommended  Colon Cancer screening (age 50 - 75): yearly FIT or colonoscopy every 5 - 10 years - Recommended  Dexa (females age ? 65, males age ? 70): every 2 years - Recommended  Glucose: every 5 years, yearly if risk factors? - Recommended  Lipid panel: every 5 years, yearly if risk factors - Recommended  Immunizations (Tetanus every 10 years, Influenza yearly, Pneumonia PPV-23 and PCV-13 age ? 65 or sooner if risk factors) - Recommended influenza vaccine  Immunization History   Administered Date(s) Administered     Influenza (High Dose) 3 valent vaccine 11/23/2015, 10/26/2016, 11/28/2017     Influenza (IIV3) PF 10/14/2011, 01/01/2013, 11/12/2013     Pneumo Conj 13-V (2010&after) 11/23/2015     Pneumococcal 23 valent 01/29/2008, 04/08/2011     TD (ADULT, 7+) 05/31/1992, 02/03/2003, 01/29/2008     Tdap (Adacel,Boostrix) 11/19/2009     Zoster vaccine, live 04/08/2011        The following health maintenance issues were also reviewed and addressed as needed:  Blood pressure (>18 years annually)  Depression - PHQ-2 Score:   PHQ-2 ( 1999 Pfizer) 3/18/2014 12/17/2013   Q1: Little interest or pleasure in doing things 1 0   Q2: Feeling down, depressed or hopeless 0 0   PHQ-2 Score 1 0       Lifestyle habits (including exercise, diet, weight)  Health risk behaviors (sexual history, alcohol, tobacco, drug use, partner violence)  Routine eye, dental, hearing, and skin exams  Advanced directives      Review of Systems:   Pertinent items are noted in HPI, remainder of complete ROS is negative.      Active Medications:     Current Outpatient Prescriptions:      aspirin 81 MG tablet, Take 1 tablet by mouth daily., Disp: , Rfl:      Calcium Citrate-Vitamin D (CITRACAL + D PO), Take 1 tablet by mouth daily , Disp: , Rfl:      Cetirizine HCl (ZYRTEC PO), Take 1 tablet by mouth daily, Disp: , Rfl:      citalopram (CELEXA) 20 MG tablet, Take 1 tablet (20 mg) by mouth daily, Disp: 90 tablet, Rfl: 3     COPPER PO, Take 1  tablet by mouth as needed , Disp: , Rfl:      cyabnocobalamin (VITAMIN B-12) 2500 MCG sublingual tablet, Take 1 tablet by mouth daily Taking daily., Disp: , Rfl:      econazole nitrate 1 % cream, Apply topically 2 times daily, Disp: 85 g, Rfl: 5     losartan (COZAAR) 100 MG tablet, Take 1 tablet (100 mg) by mouth daily, Disp: 90 tablet, Rfl: 3     losartan (COZAAR) 50 MG tablet, Take 1 tablet (50 mg) by mouth daily Patient needs to see primary provider and have labs for further refills., Disp: 90 tablet, Rfl: 0     losartan (COZAAR) 50 MG tablet, Take 1 tablet (50 mg) by mouth daily, Disp: 90 tablet, Rfl: 1     metoprolol succinate (TOPROL-XL) 50 MG 24 hr tablet, Take 1 tablet (50 mg) by mouth daily, Disp: 90 tablet, Rfl: 3     minoxidil (ROGAINE) 2 % external solution, Apply topically daily , Disp: , Rfl:      Multiple Vitamins-Minerals (CENTRUM SILVER PO), Take  by mouth., Disp: , Rfl:      Omega-3 Fatty Acids (OMEGA 3 PO), Take 1 capsule by mouth daily , Disp: , Rfl:      omeprazole (PRILOSEC) 20 MG CR capsule, Take 1 capsule (20 mg) by mouth daily, Disp: 90 capsule, Rfl: 3     omeprazole (PRILOSEC) 20 MG CR capsule, Take 1 tablet (20 mg) by mouth daily Take 30-60 minutes before a meal. (Patient taking differently: Take 1 tablet (20 mg) by mouth daily Take 30-60 minutes before a meal.  Taking every other day. Alternates with Zantac.), Disp: 90 capsule, Rfl: 1     ORDER FOR DME, Use your CPAP device as directed by your provider., Disp: , Rfl:      polyethylene glycol 0.4%- propylene glycol 0.3% (SYSTANE ULTRA) 0.4-0.3 % SOLN ophthalmic solution, Place 1 drop into both eyes 2 times daily, Disp: , Rfl:      psyllium (METAMUCIL SMOOTH TEXTURE) 63 % POWD, Take 1 Tablespoonful by mouth 3 times daily Mix in 8 ounces of water, Disp: 3 Bottle, Rfl: 3     RaNITidine HCl (ZANTAC PO), Alternates with Prilosec., Disp: , Rfl:      SALICYLIC ACID 40%, UREA 20% IN PETROLEUM, FV COMPOUNDED,    CREAM, Twice daily, Disp: 120 g,  Rfl: 3     Urea 40 % CREA, Externally apply  topically. Apply to feet daily, Disp: 60 g, Rfl: 3     ezetimibe (ZETIA) 10 MG tablet, Take 1 tablet (10 mg) by mouth daily (Patient not taking: Reported on 11/28/2017), Disp: 90 tablet, Rfl: 3     hydrochlorothiazide 12.5 MG TABS tablet, Take 1 tablet (12.5 mg) by mouth daily Every other day. (Patient not taking: Reported on 11/16/2018), Disp: 45 tablet, Rfl: 1     UNABLE TO FIND, daily MEDICATION NAME: Charles Support, Disp: , Rfl:      [DISCONTINUED] citalopram (CELEXA) 20 MG tablet, Take 1 tablet (20 mg) by mouth daily, Disp: 90 tablet, Rfl: 3     [DISCONTINUED] metoprolol succinate (TOPROL-XL) 50 MG 24 hr tablet, Take 1 tablet (50 mg) by mouth daily, Disp: 90 tablet, Rfl: 1      Allergies:   Gabapentin; Ibuprofen sodium; and Simvastatin      Past Medical History:  Bilateral cataracts  Dyslipidemia  Hypertension  Statin intolerance  Idiopathic polyneuropathy  Bladder incontinence  Obstructive sleep apnea  Dermatitis  Hyperlipidemia LDL goal < 100     Social History:     Non smoker  [place]building [place] home in [place]. Currently living with daughter.     Physical Exam:   /84  Pulse 65  Resp 18  Breastfeeding? No   Constitutional: Alert. In no distress.  Head: Normocephalic. No masses, lesions, tenderness or abnormalities.  ENT: No neck nodes or sinus tenderness.  Cardiovascular: RRR. No murmurs, clicks, gallops, or rub.  Respiratory: Clear to auscultation bilaterally, no wheezes or crackles.  Gastrointestinal: Abdomen soft. Non-tender. BS normal. No masses or organomegaly.  Musculoskeletal: Extremities normal. No gross deformities noted. Normal muscle tone. Bilateral trace edema in legs.   Skin: No suspicious lesions. No rashes.  Neurologic: Gait normal. Reflexes normal and symmetric. Sensation grossly WNL.  Psychiatric: Mentation appears normal. Normal affect.   Hematologic/Lymphatic/Immunologic: Normal cervical lymph nodes.      Assessment and  Plan:    Hyperlipidemia with target LDL less than 100  - Lipid panel reflex to direct LDL Fasting  - Comprehensive metabolic panel    Health care maintenance  - Mammogram, routine screening  - GASTROENTEROLOGY ADULT REF PROCEDURE ONLY  - FLU VACCINE HIGH DOSE PRESERVATIVE FREE, AGE =>65 YR    MARCELO (obstructive sleep apnea)  - SLEEP EVALUATION & MANAGEMENT REFERRAL - ADULT -Other (Respond in commments) ([facility])    Postmenopausal status  DEXA ordered. She will continue her calcium supplements twice daily and try alternating Zantac with Omeprazole if tolerating.  - Dexa hip/pelvis/spine*    Other depression  - citalopram (CELEXA) 20 MG tablet  Dispense: 90 tablet; Refill: 3. She states at this point she mainly needs this as helps her PN sx.    Benign essential hypertension  Patient discontinued Hydrochlorothiazide following the drug recall and is afraid to resume. Leg edema is not bothering her, we will increase the dose of her Losartan. She agreed to check blood pressure at home.  - metoprolol succinate (TOPROL-XL) 50 MG 24 hr tablet  Dispense: 90 tablet; Refill: 3  - losartan (COZAAR) 100 MG tablet  Dispense: 90 tablet; Refill: 3     Bilateral trigger fingers  Normal exam today but she describes triggering. She will see sports medicine PRN.      Follow-up: Data Unavailable         Scribe Disclosure:   I, Dominguez Liu, am serving as a scribe to document services personally performed by Babatunde Mueller MD at this visit, based upon the provider's statements to me. All documentation has been reviewed by the aforementioned provider prior to being entered into the official medical record.     Portions of this medical record were completed by a scribe. UPON MY REVIEW AND AUTHENTICATION BY ELECTRONIC SIGNATURE, this confirms (a) I performed the applicable clinical services, and (b) the record is accurate.   Babatunde Mueller MD   numerical 0-10

## 2025-05-11 DIAGNOSIS — R60.9 EDEMA, UNSPECIFIED TYPE: ICD-10-CM

## 2025-05-11 DIAGNOSIS — F32.89 OTHER DEPRESSION: ICD-10-CM

## 2025-05-11 DIAGNOSIS — I10 BENIGN ESSENTIAL HYPERTENSION: ICD-10-CM

## 2025-05-13 NOTE — TELEPHONE ENCOUNTER
Last Written Prescription:  citalopram (CELEXA) 20 MG tablet  20 mg, DAILY           Summary: TAKE 1 TABLET (20 MG) BY MOUTH DAILY, Disp-90 tablet, R-3, E-Prescribe Patient enrolled in our Rx Med Sync service to improve adherence. We are requesting a refill authorization in advance to ensure an active prescription is on file.   Dose, Route, Frequency: 20 mg, Oral, DAILYStart: 05/24/2024Ordered On: 05/24/2024Pharmacy: [id number] - [place], [address] Associated: Taking: Long-term: Med Note:                Directions: TAKE 1 TABLET (20 MG) BY MOUTH DAILY  Ordering Department: [facility]  Authorized By: Babatunde Mueller MD  Dispense: 90 tablet  Refills: 3 ordered       hydrochlorothiazide (HYDRODIURIL) 25 MG tablet  25 mg, DAILY           Summary: TAKE 1 TABLET (25 MG) BY MOUTH DAILY, Disp-90 tablet, R-3, E-Prescribe Patient enrolled in our Rx Med Sync service to improve adherence. We are requesting a refill authorization in advance to ensure an active prescription is on file.   Dose, Route, Frequency: 25 mg, Oral, DAILYStart: 05/24/2024Ordered On: 05/24/2024Pharmacy: [id number] - [address] Associated: Taking: Long-term: Med Note:                Directions: TAKE 1 TABLET (25 MG) BY MOUTH DAILY  Ordering Department: [facility]  Authorized By: Babatunde Mueller MD  Dispense: 90 tablet  Refills: 3 ordered       losartan (COZAAR) 100 MG tablet  100 mg, DAILY           Summary: TAKE 1 TABLET (100 MG) BY MOUTH DAILY, Disp-90 tablet, R-3, E-Prescribe Patient enrolled in our Rx Med Sync service to improve adherence. We are requesting a refill authorization in advance to ensure an active prescription is on file.   Dose, Route, Frequency: 100 mg, Oral, DAILYStart: 05/24/2024Ordered On: 05/24/2024Pharmacy: [id number] - [place], [address] Associated: Taking: Long-term: Med Note:                 Directions: TAKE 1 TABLET (100 MG) BY MOUTH DAILY  Ordering Department: [facility]  Authorized By: Babatunde Mueller MD  Dispense: 90 tablet  Refills: 3 ordered       metoprolol succinate ER (TOPROL XL) 50 MG 24 hr tablet  50 mg, DAILY           Summary: TAKE 1 TABLET (50 MG) BY MOUTH DAILY, Disp-90 tablet, R-3, E-Prescribe Patient enrolled in our Rx Med Sync service to improve adherence. We are requesting a refill authorization in advance to ensure an active prescription is on file.   Dose, Route, Frequency: 50 mg, Oral, DAILYStart: 05/24/2024Ordered On: 05/24/2024Pharmacy: [id number] - [place], [place] - [address] [place] [address] Associated: Taking: Long-term: Med Note:                Directions: TAKE 1 TABLET (50 MG) BY MOUTH DAILY  Ordering Department: [facility]  Authorized By: Babatunde Mueller MD  Dispense: 90 tablet       ----------------------  Last Visit Date: 03/06/2024 Office Visit [facility] - LUCA Mueller   Future Visit Date: None  ----------------------      Refill decision: Medication refilled per  Medication Refill in Ambulatory Care  policy.   []  If no future appointment scheduled: Route to Clinic Coordinators to contact the pt for appointment.      Refill decision: Medication unable to be refilled by RN due to: Pt not seen within past 12 months, No FOV or FOV exceeds timeframe per protocol   and Overdue labs/test:      Last PHQ9   was 4   on 6/6/19    Last Comprehensive Metabolic Panel:  Lab Results   Component Value Date     02/16/2024    POTASSIUM 4.4 02/16/2024    CHLORIDE 100 02/16/2024    CO2 29 02/16/2024    ANIONGAP 10 02/16/2024    GLC 98 02/16/2024    BUN 15.6 02/16/2024    CR 0.78 02/16/2024    GFRESTIMATED 76 02/16/2024    JAVIER 10.6 (H) 02/16/2024     BP Readings from Last 3 Encounters:   03/06/24 (!) 146/83   02/29/24 (!) 175/84   02/16/24 (!) 176/107       Request from pharmacy:  Requested Prescriptions   Pending Prescriptions Disp  Refills    hydrochlorothiazide (HYDRODIURIL) 25 MG tablet [Pharmacy Med Name: HYDROCHLOROTHIAZIDE 25MG TAB] 90 tablet 3     Sig: TAKE 1 TABLET (25 MG) BY MOUTH DAILY       Diuretics (Including Combos) Protocol Failed - 5/13/2025 10:25 AM        Failed - Most recent blood pressure under 140/90 in past 12 months     BP Readings from Last 3 Encounters:   03/06/24 (!) 146/83   02/29/24 (!) 175/84   02/16/24 (!) 176/107       No data recorded            Failed - Potassium level on file in past 12 months        Failed - Has GFR on file in past 12 months and most recent value is normal        Failed - Recent (12 mo) or future (90 days) visit within the authorizing provider's specialty     The patient must have completed an in-person or virtual visit within the past 12 months or has a future visit scheduled within the next 90 days with the authorizing provider s specialty.  Urgent care and e-visits do not qualify as an office visit for this protocol.          Passed - Medication is active on med list and the sig matches. RN to manually verify dose and sig if red X/fail.     If the protocol passes (green check), you do not need to verify med dose and sig.    A prescription matches if they are the same clinical intention.    For Example: once daily and every morning are the same.    The protocol can not identify upper and lower case letters as matching and will fail.     For Example: Take 1 tablet (50 mg) by mouth daily     TAKE 1 TABLET (50 MG) BY MOUTH DAILY    For all fails (red x), verify dose and sig.    If the refill does match what is on file, the RN can still proceed to approve the refill request.       If they do not match, route to the appropriate provider.             Passed - Medication indicated for associated diagnosis     Medication is associated with one or more of the following diagnoses:     Edema   Hypertension   Heart Failure   Meniere's Disease   Bilateral localized swelling of lower limbs   Pulmonary  Hypertension          Passed - Patient is age 18 or older        Passed - No active pregancy on record        Passed - No positive pregnancy test in past 12 months          metoprolol succinate ER (TOPROL XL) 50 MG 24 hr tablet [Pharmacy Med Name: METOPROLOL SUCC 50MG ER TABLET] 90 tablet 3     Sig: TAKE 1 TABLET (50 MG) BY MOUTH DAILY       Beta-Blockers Protocol Failed - 5/13/2025 10:25 AM        Failed - Most recent blood pressure under 140/90 in past 12 months     BP Readings from Last 3 Encounters:   03/06/24 (!) 146/83   02/29/24 (!) 175/84   02/16/24 (!) 176/107       No data recorded            Failed - Recent (12 mo) or future (90 days) visit within the authorizing provider's specialty     The patient must have completed an in-person or virtual visit within the past 12 months or has a future visit scheduled within the next 90 days with the authorizing provider s specialty.  Urgent care and e-visits do not qualify as an office visit for this protocol.          Passed - Patient is age 6 or older        Passed - Medication is active on med list and the sig matches. RN to manually verify dose and sig if red X/fail.     If the protocol passes (green check), you do not need to verify med dose and sig.    A prescription matches if they are the same clinical intention.    For Example: once daily and every morning are the same.    The protocol can not identify upper and lower case letters as matching and will fail.     For Example: Take 1 tablet (50 mg) by mouth daily     TAKE 1 TABLET (50 MG) BY MOUTH DAILY    For all fails (red x), verify dose and sig.    If the refill does match what is on file, the RN can still proceed to approve the refill request.       If they do not match, route to the appropriate provider.             Passed - Medication indicated for associated diagnosis     Medication is associated with one or more of the following diagnoses:     Hypertension (HTN)   Atrial  fibrillation/flutter   Angina   ASCVD   Migraine   Heart Failure   Tremor   Anxiety   Ocular hypertension   Glaucoma   PTSD   Obstructive hypertrophic cardiomyopathy   History of myocarditis   Palpitations   POTS (postural orthostatic tachycardia syndrome)   SVT (supraventricular tachycardia)   Hyperthyroidism   Tachycardia   Acute non-st segment elevation myocardial infarction   Subsequent non-st segment elevation myocardial infarction   Ischemic myocardial dysfunction            losartan (COZAAR) 100 MG tablet [Pharmacy Med Name: LOSARTAN 100MG TABLET] 90 tablet 3     Sig: TAKE 1 TABLET (100 MG) BY MOUTH DAILY       Angiotensin-II Receptors Failed - 5/13/2025 10:25 AM        Failed - Most recent blood pressure under 140/90 in past 12 months     BP Readings from Last 3 Encounters:   03/06/24 (!) 146/83   02/29/24 (!) 175/84   02/16/24 (!) 176/107       No data recorded            Failed - Has GFR on file in past 12 months and most recent value is normal        Failed - Recent (12 mo) or future (90days) visit within the authorizing provider's specialty     The patient must have completed an in-person or virtual visit within the past 12 months or has a future visit scheduled within the next 90 days with the authorizing provider s specialty.  Urgent care and e-visits do not qualify as an office visit for this protocol.          Failed - Normal serum potassium on file in past 12 months     Recent Labs   Lab Test 02/16/24  1653   POTASSIUM 4.4                    Passed - Medication is active on med list and the sig matches. RN to manually verify dose and sig if red X/fail.     If the protocol passes (green check), you do not need to verify med dose and sig.    A prescription matches if they are the same clinical intention.    For Example: once daily and every morning are the same.    The protocol can not identify upper and lower case letters as matching and will fail.     For Example: Take 1 tablet (50 mg) by mouth  daily     TAKE 1 TABLET (50 MG) BY MOUTH DAILY    For all fails (red x), verify dose and sig.    If the refill does match what is on file, the RN can still proceed to approve the refill request.       If they do not match, route to the appropriate provider.             Passed - Medication indicated for associated diagnosis     The medication is prescribed for one or more of the following conditions:    Chronic Kidney Disease (CDK)    Heart Failure (HF)    Diabetes, Nephropathy   Hypertension    Coronary Artery Disease (CAD)   Raynaud's Disease   Ischemic cardiomyopathy   Cardiomyopathy   Pulmonary Hypertension          Passed - Patient is age 18 or older        Passed - No active pregnancy on record        Passed - No positive pregnancy test in past 12 months          citalopram (CELEXA) 20 MG tablet [Pharmacy Med Name: CITALOPRAM 20MG TABLET] 90 tablet 3     Sig: TAKE 1 TABLET (20 MG) BY MOUTH DAILY       SSRIs Protocol Failed - 5/13/2025 10:25 AM        Failed - PHQ-9 score less than 5 in past 6 months     Please review last PHQ-9 score.       10/26/2016     1:13 PM 11/28/2017    12:16 PM 6/6/2019     2:01 PM   PHQ-9 SCORE   PHQ-9 Total Score 6  2  4       Data saved with a previous flowsheet row definition             Failed - Recent (12 mo) or future (90 days) visit within the authorizing provider's specialty     The patient must have completed an in-person or virtual visit within the past 12 months or has a future visit scheduled within the next 90 days with the authorizing provider s specialty.  Urgent care and e-visits do not qualify as an office visit for this protocol.          Passed - Medication is active on med list and the sig matches. RN to manually verify dose and sig if red X/fail.     If the protocol passes (green check), you do not need to verify med dose and sig.    A prescription matches if they are the same clinical intention.    For Example: once daily and every morning are the same.    The  protocol can not identify upper and lower case letters as matching and will fail.     For Example: Take 1 tablet (50 mg) by mouth daily     TAKE 1 TABLET (50 MG) BY MOUTH DAILY    For all fails (red x), verify dose and sig.    If the refill does match what is on file, the RN can still proceed to approve the refill request.       If they do not match, route to the appropriate provider.             Passed - Medication indicated for associated diagnosis     Medication is associated with one or more of the following diagnoses:              Anxiety             Bipolar  Depression  Obsessive-compulsive disorder             Panic disorder  Postmenopausal flushing             Premenstrual dysphoric disorder             Social phobia   Adjustment disorder with depressed mood   Mood disorder   Adjustment disorder with anxious mood          Passed - Patient is age 18 or older        Passed - No active pregnancy on record        Passed - No positive pregnancy test in last 12 months         Ya B, RN  [facility]/Red Flag Triage & Med Refill Team

## 2025-05-15 RX ORDER — HYDROCHLOROTHIAZIDE 25 MG/1
25 TABLET ORAL DAILY
Qty: 90 TABLET | Refills: 3 | Status: SHIPPED | OUTPATIENT
Start: 2025-05-15

## 2025-05-15 RX ORDER — CITALOPRAM HYDROBROMIDE 20 MG/1
20 TABLET ORAL DAILY
Qty: 90 TABLET | Refills: 3 | Status: SHIPPED | OUTPATIENT
Start: 2025-05-15

## 2025-05-15 RX ORDER — LOSARTAN POTASSIUM 100 MG/1
100 TABLET ORAL DAILY
Qty: 90 TABLET | Refills: 3 | Status: SHIPPED | OUTPATIENT
Start: 2025-05-15

## 2025-05-15 RX ORDER — METOPROLOL SUCCINATE 50 MG/1
50 TABLET, EXTENDED RELEASE ORAL DAILY
Qty: 90 TABLET | Refills: 3 | Status: SHIPPED | OUTPATIENT
Start: 2025-05-15

## 2025-05-15 NOTE — TELEPHONE ENCOUNTER
Left Voicemail (1st Attempt) for the patient to call back and schedule the following:    Appointment type: Return/AMW Physical  Provider: PCP  Return date: Next available   Specialty phone number: [phone]

## 2025-07-03 DIAGNOSIS — E78.5 HYPERLIPIDEMIA WITH TARGET LDL LESS THAN 100: ICD-10-CM

## 2025-07-08 NOTE — TELEPHONE ENCOUNTER
Last Written Prescription:  ezetimibe (ZETIA) 10 MG tablet 90 tablet 0 3/3/2025     ----------------------  Last Visit Date: 3/6/24  Future Visit Date: 0  ----------------------      Refill decision:     A one-time only 90-day josh refill was already given, pended to care team for review.       Request from pharmacy:  Requested Prescriptions   Pending Prescriptions Disp Refills    ezetimibe (ZETIA) 10 MG tablet [Pharmacy Med Name: EZETIMIBE 10MG TABLET] 90 tablet 0     Sig: TAKE 1 TABLET (10 MG) BY MOUTH DAILY.       Antihyperlipidemic agents Failed - 7/8/2025  2:20 PM        Failed - LDL on file in the past 12 months        Failed - Recent (12 month) or future (90 days) visit with authorizing provider's specialty (provided they have been seen in the past 15 months)     The patient must have completed an in-person or virtual visit within the past 12 months or has a future visit scheduled within the next 90 days with the authorizing provider s specialty.  Urgent care and e-visits do not qualify as an office visit for this protocol.          Passed - Medication is active on med list and the sig matches. RN to manually verify dose and sig if red X/fail.     If the protocol passes (green check), you do not need to verify med dose and sig.    A prescription matches if they are the same clinical intention.    For Example: once daily and every morning are the same.    The protocol can not identify upper and lower case letters as matching and will fail.     For Example: Take 1 tablet (50 mg) by mouth daily     TAKE 1 TABLET (50 MG) BY MOUTH DAILY    For all fails (red x), verify dose and sig.    If the refill does match what is on file, the RN can still proceed to approve the refill request.       If they do not match, route to the appropriate provider.             Passed - Patient is age 18 years or older        Passed - No active pregnancy on record        Passed - No positive pregnancy test in past 12 mos

## 2025-07-09 RX ORDER — EZETIMIBE 10 MG/1
10 TABLET ORAL DAILY
Qty: 90 TABLET | Refills: 0 | Status: SHIPPED | OUTPATIENT
Start: 2025-07-09